# Patient Record
Sex: FEMALE | Race: OTHER | NOT HISPANIC OR LATINO | URBAN - METROPOLITAN AREA
[De-identification: names, ages, dates, MRNs, and addresses within clinical notes are randomized per-mention and may not be internally consistent; named-entity substitution may affect disease eponyms.]

---

## 2018-01-29 ENCOUNTER — OFFICE VISIT CONVERTED (OUTPATIENT)
Dept: INTERNAL MEDICINE | Facility: CLINIC | Age: 23
End: 2018-01-29
Attending: INTERNAL MEDICINE

## 2018-04-09 ENCOUNTER — OFFICE VISIT CONVERTED (OUTPATIENT)
Dept: INTERNAL MEDICINE | Facility: CLINIC | Age: 23
End: 2018-04-09
Attending: INTERNAL MEDICINE

## 2018-07-09 ENCOUNTER — OFFICE VISIT CONVERTED (OUTPATIENT)
Dept: INTERNAL MEDICINE | Facility: CLINIC | Age: 23
End: 2018-07-09
Attending: INTERNAL MEDICINE

## 2018-09-11 ENCOUNTER — OFFICE VISIT CONVERTED (OUTPATIENT)
Dept: INTERNAL MEDICINE | Facility: CLINIC | Age: 23
End: 2018-09-11
Attending: INTERNAL MEDICINE

## 2018-09-28 ENCOUNTER — CONVERSION ENCOUNTER (OUTPATIENT)
Dept: MAMMOGRAPHY | Facility: HOSPITAL | Age: 23
End: 2018-09-28

## 2019-01-17 ENCOUNTER — HOSPITAL ENCOUNTER (OUTPATIENT)
Dept: OTHER | Facility: HOSPITAL | Age: 24
Discharge: HOME | End: 2019-01-17
Attending: INTERNAL MEDICINE

## 2019-01-17 LAB
EST. AVERAGE GLUCOSE BLD GHB EST-MCNC: 77 MG/DL
HBA1C MFR BLD: <4.3 % (ref 3.5–5.7)
IRON SATN MFR SERPL: 22 % (ref 20–55)
IRON SERPL-MCNC: 70 UG/DL (ref 60–170)
T4 FREE SERPL-MCNC: 1 NG/DL (ref 0.9–1.8)
TIBC SERPL-MCNC: 315 UG/DL (ref 245–450)
TRANSFERRIN SERPL-MCNC: 220 MG/DL (ref 250–380)
TSH SERPL-ACNC: 6.53 M[IU]/L (ref 0.27–4.2)

## 2019-02-07 ENCOUNTER — OFFICE VISIT CONVERTED (OUTPATIENT)
Dept: INTERNAL MEDICINE | Facility: CLINIC | Age: 24
End: 2019-02-07
Attending: INTERNAL MEDICINE

## 2019-02-07 ENCOUNTER — HOSPITAL ENCOUNTER (OUTPATIENT)
Dept: OTHER | Facility: HOSPITAL | Age: 24
Discharge: HOME | End: 2019-02-07
Attending: INTERNAL MEDICINE

## 2019-02-09 LAB — BACTERIA UR CULT: NORMAL

## 2019-05-07 ENCOUNTER — CONVERSION ENCOUNTER (OUTPATIENT)
Dept: INTERNAL MEDICINE | Facility: CLINIC | Age: 24
End: 2019-05-07

## 2019-05-07 ENCOUNTER — HOSPITAL ENCOUNTER (OUTPATIENT)
Dept: OTHER | Facility: HOSPITAL | Age: 24
Discharge: HOME | End: 2019-05-07
Attending: INTERNAL MEDICINE

## 2019-05-07 ENCOUNTER — OFFICE VISIT CONVERTED (OUTPATIENT)
Dept: INTERNAL MEDICINE | Facility: CLINIC | Age: 24
End: 2019-05-07
Attending: INTERNAL MEDICINE

## 2019-05-07 LAB
BASOPHILS # BLD AUTO: 0.08 10*3/UL (ref 0–0.2)
BASOPHILS NFR BLD AUTO: 1 % (ref 0–3)
CONV ABS IMM GRAN: 0.01 10*3/UL (ref 0–0.2)
CONV IMMATURE GRAN: 0.1 % (ref 0–1.8)
DEPRECATED RDW RBC AUTO: 44 FL (ref 36.4–46.3)
EOSINOPHIL # BLD AUTO: 0.27 10*3/UL (ref 0–0.7)
EOSINOPHIL # BLD AUTO: 3.3 % (ref 0–7)
ERYTHROCYTE [DISTWIDTH] IN BLOOD BY AUTOMATED COUNT: 12.1 % (ref 11.7–14.4)
HBA1C MFR BLD: 13.7 G/DL (ref 12–16)
HCT VFR BLD AUTO: 42.7 % (ref 37–47)
LYMPHOCYTES # BLD AUTO: 2.16 10*3/UL (ref 1–5)
MCH RBC QN AUTO: 31.9 PG (ref 27–31)
MCHC RBC AUTO-ENTMCNC: 32.1 G/DL (ref 33–37)
MCV RBC AUTO: 99.5 FL (ref 81–99)
MONOCYTES # BLD AUTO: 0.58 10*3/UL (ref 0.2–1.2)
MONOCYTES NFR BLD AUTO: 7.1 % (ref 3–10)
NEUTROPHILS # BLD AUTO: 5.07 10*3/UL (ref 2–8)
NEUTROPHILS NFR BLD AUTO: 62.1 % (ref 30–85)
NRBC CBCN: 0 % (ref 0–0.7)
PLATELET # BLD AUTO: 289 10*3/UL (ref 130–400)
PMV BLD AUTO: 11.4 FL (ref 9.4–12.3)
RBC # BLD AUTO: 4.29 10*6/UL (ref 4.2–5.4)
VARIANT LYMPHS NFR BLD MANUAL: 26.4 % (ref 20–45)
WBC # BLD AUTO: 8.17 10*3/UL (ref 4.8–10.8)

## 2019-05-08 LAB
ALBUMIN SERPL-MCNC: 4.4 G/DL (ref 3.5–5)
ALBUMIN/GLOB SERPL: 1.4 {RATIO} (ref 1.4–2.6)
ALP SERPL-CCNC: 63 U/L (ref 42–98)
ALT SERPL-CCNC: 17 U/L (ref 10–40)
ANION GAP SERPL CALC-SCNC: 16 MMOL/L (ref 8–19)
AST SERPL-CCNC: 19 U/L (ref 15–50)
BILIRUB SERPL-MCNC: 0.5 MG/DL (ref 0.2–1.3)
BUN SERPL-MCNC: 9 MG/DL (ref 5–25)
BUN/CREAT SERPL: 13 {RATIO} (ref 6–20)
CALCIUM SERPL-MCNC: 9.6 MG/DL (ref 8.7–10.4)
CHLORIDE SERPL-SCNC: 102 MMOL/L (ref 99–111)
CONV CO2: 27 MMOL/L (ref 22–32)
CONV TOTAL PROTEIN: 7.5 G/DL (ref 6.3–8.2)
CREAT UR-MCNC: 0.69 MG/DL (ref 0.5–0.9)
GFR SERPLBLD BASED ON 1.73 SQ M-ARVRAT: >60 ML/MIN/{1.73_M2}
GLOBULIN UR ELPH-MCNC: 3.1 G/DL (ref 2–3.5)
GLUCOSE SERPL-MCNC: 80 MG/DL (ref 65–99)
IRON SATN MFR SERPL: 19 % (ref 20–55)
IRON SERPL-MCNC: 56 UG/DL (ref 60–170)
MAGNESIUM SERPL-MCNC: 1.84 MG/DL (ref 1.6–2.3)
OSMOLALITY SERPL CALC.SUM OF ELEC: 290 MOSM/KG (ref 273–304)
POTASSIUM SERPL-SCNC: 3.8 MMOL/L (ref 3.5–5.3)
SODIUM SERPL-SCNC: 141 MMOL/L (ref 135–147)
T4 FREE SERPL-MCNC: 1.3 NG/DL (ref 0.9–1.8)
TIBC SERPL-MCNC: 289 UG/DL (ref 245–450)
TRANSFERRIN SERPL-MCNC: 202 MG/DL (ref 250–380)
TSH SERPL-ACNC: 1.35 M[IU]/L (ref 0.27–4.2)
VIT B12 SERPL-MCNC: 463 PG/ML (ref 211–911)

## 2019-05-15 ENCOUNTER — HOSPITAL ENCOUNTER (OUTPATIENT)
Dept: CT IMAGING | Facility: HOSPITAL | Age: 24
Discharge: HOME | End: 2019-05-15
Attending: INTERNAL MEDICINE

## 2019-08-05 ENCOUNTER — HOSPITAL ENCOUNTER (OUTPATIENT)
Dept: OTHER | Facility: HOSPITAL | Age: 24
Discharge: HOME | End: 2019-08-05
Attending: INTERNAL MEDICINE

## 2019-08-05 ENCOUNTER — OFFICE VISIT CONVERTED (OUTPATIENT)
Dept: INTERNAL MEDICINE | Facility: CLINIC | Age: 24
End: 2019-08-05
Attending: INTERNAL MEDICINE

## 2019-08-05 LAB
T4 FREE SERPL-MCNC: 1.4 NG/DL (ref 0.9–1.8)
TSH SERPL-ACNC: 2.4 M[IU]/L (ref 0.27–4.2)

## 2019-08-06 LAB
IRON SATN MFR SERPL: 23 % (ref 20–55)
IRON SERPL-MCNC: 65 UG/DL (ref 60–170)
TIBC SERPL-MCNC: 287 UG/DL (ref 245–450)
TRANSFERRIN SERPL-MCNC: 201 MG/DL (ref 250–380)

## 2019-09-17 ENCOUNTER — OFFICE VISIT CONVERTED (OUTPATIENT)
Dept: INTERNAL MEDICINE | Facility: CLINIC | Age: 24
End: 2019-09-17
Attending: INTERNAL MEDICINE

## 2019-09-25 ENCOUNTER — OFFICE VISIT CONVERTED (OUTPATIENT)
Dept: SURGERY | Facility: CLINIC | Age: 24
End: 2019-09-25
Attending: SURGERY

## 2019-10-02 ENCOUNTER — OFFICE VISIT CONVERTED (OUTPATIENT)
Dept: SURGERY | Facility: CLINIC | Age: 24
End: 2019-10-02
Attending: SURGERY

## 2019-10-11 ENCOUNTER — HOSPITAL ENCOUNTER (OUTPATIENT)
Dept: PERIOP | Facility: HOSPITAL | Age: 24
Setting detail: HOSPITAL OUTPATIENT SURGERY
Discharge: HOME | End: 2019-10-11
Attending: SURGERY

## 2019-10-11 LAB — HCG UR QL: NEGATIVE

## 2019-10-13 LAB — BACTERIA SPEC ANAEROBE CULT: NORMAL

## 2019-10-14 LAB
AMOXICILLIN+CLAV SUSC ISLT: >=32
AMPICILLIN SUSC ISLT: >=32
AMPICILLIN+SULBAC SUSC ISLT: >=32
BACTERIA SPEC AEROBE CULT: ABNORMAL
CEFAZOLIN SUSC ISLT: >=64
CEFEPIME SUSC ISLT: <=1
CEFTAZIDIME SUSC ISLT: 16
CEFTRIAXONE SUSC ISLT: 8
CEFUROXIME ORAL SUSC ISLT: >=64
CEFUROXIME PARENTER SUSC ISLT: >=64
CIPROFLOXACIN SUSC ISLT: <=0.25
ERTAPENEM SUSC ISLT: <=0.5
GENTAMICIN SUSC ISLT: <=1
LEVOFLOXACIN SUSC ISLT: <=0.12
TETRACYCLINE SUSC ISLT: <=1
TMP SMX SUSC ISLT: <=20
TOBRAMYCIN SUSC ISLT: <=1

## 2019-10-23 ENCOUNTER — OFFICE VISIT CONVERTED (OUTPATIENT)
Dept: SURGERY | Facility: CLINIC | Age: 24
End: 2019-10-23
Attending: SURGERY

## 2019-11-05 ENCOUNTER — HOSPITAL ENCOUNTER (OUTPATIENT)
Dept: OTHER | Facility: HOSPITAL | Age: 24
Discharge: HOME | End: 2019-11-05
Attending: INTERNAL MEDICINE

## 2019-11-05 ENCOUNTER — OFFICE VISIT CONVERTED (OUTPATIENT)
Dept: INTERNAL MEDICINE | Facility: CLINIC | Age: 24
End: 2019-11-05
Attending: INTERNAL MEDICINE

## 2019-11-05 ENCOUNTER — CONVERSION ENCOUNTER (OUTPATIENT)
Dept: INTERNAL MEDICINE | Facility: CLINIC | Age: 24
End: 2019-11-05

## 2019-11-05 LAB
ALBUMIN SERPL-MCNC: 4.8 G/DL (ref 3.5–5)
ALBUMIN/GLOB SERPL: 1.4 {RATIO} (ref 1.4–2.6)
ALP SERPL-CCNC: 64 U/L (ref 42–98)
ALT SERPL-CCNC: 16 U/L (ref 10–40)
ANION GAP SERPL CALC-SCNC: 16 MMOL/L (ref 8–19)
AST SERPL-CCNC: 19 U/L (ref 15–50)
BASOPHILS # BLD AUTO: 0.05 10*3/UL (ref 0–0.2)
BASOPHILS NFR BLD AUTO: 0.8 % (ref 0–3)
BILIRUB SERPL-MCNC: 0.32 MG/DL (ref 0.2–1.3)
BUN SERPL-MCNC: 8 MG/DL (ref 5–25)
BUN/CREAT SERPL: 12 {RATIO} (ref 6–20)
CALCIUM SERPL-MCNC: 10.2 MG/DL (ref 8.7–10.4)
CHLORIDE SERPL-SCNC: 99 MMOL/L (ref 99–111)
CONV ABS IMM GRAN: 0.01 10*3/UL (ref 0–0.2)
CONV CO2: 25 MMOL/L (ref 22–32)
CONV IMMATURE GRAN: 0.2 % (ref 0–1.8)
CONV TOTAL PROTEIN: 8.2 G/DL (ref 6.3–8.2)
CREAT UR-MCNC: 0.66 MG/DL (ref 0.5–0.9)
CRP SERPL HS-MCNC: 0.62 MG/DL (ref 0–0.5)
DEPRECATED RDW RBC AUTO: 42.4 FL (ref 36.4–46.3)
EOSINOPHIL # BLD AUTO: 0.21 10*3/UL (ref 0–0.7)
EOSINOPHIL # BLD AUTO: 3.5 % (ref 0–7)
ERYTHROCYTE [DISTWIDTH] IN BLOOD BY AUTOMATED COUNT: 11.8 % (ref 11.7–14.4)
GFR SERPLBLD BASED ON 1.73 SQ M-ARVRAT: >60 ML/MIN/{1.73_M2}
GLOBULIN UR ELPH-MCNC: 3.4 G/DL (ref 2–3.5)
GLUCOSE SERPL-MCNC: 86 MG/DL (ref 65–99)
HCT VFR BLD AUTO: 43.9 % (ref 37–47)
HGB BLD-MCNC: 14.3 G/DL (ref 12–16)
LYMPHOCYTES # BLD AUTO: 1.64 10*3/UL (ref 1–5)
LYMPHOCYTES NFR BLD AUTO: 27.5 % (ref 20–45)
MCH RBC QN AUTO: 31.8 PG (ref 27–31)
MCHC RBC AUTO-ENTMCNC: 32.6 G/DL (ref 33–37)
MCV RBC AUTO: 97.8 FL (ref 81–99)
MONOCYTES # BLD AUTO: 0.49 10*3/UL (ref 0.2–1.2)
MONOCYTES NFR BLD AUTO: 8.2 % (ref 3–10)
NEUTROPHILS # BLD AUTO: 3.57 10*3/UL (ref 2–8)
NEUTROPHILS NFR BLD AUTO: 59.8 % (ref 30–85)
NRBC CBCN: 0 % (ref 0–0.7)
OSMOLALITY SERPL CALC.SUM OF ELEC: 280 MOSM/KG (ref 273–304)
PLATELET # BLD AUTO: 308 10*3/UL (ref 130–400)
PMV BLD AUTO: 11.5 FL (ref 9.4–12.3)
POTASSIUM SERPL-SCNC: 4.2 MMOL/L (ref 3.5–5.3)
RBC # BLD AUTO: 4.49 10*6/UL (ref 4.2–5.4)
SODIUM SERPL-SCNC: 136 MMOL/L (ref 135–147)
T4 FREE SERPL-MCNC: 1.2 NG/DL (ref 0.9–1.8)
TSH SERPL-ACNC: 2.45 M[IU]/L (ref 0.27–4.2)
WBC # BLD AUTO: 5.97 10*3/UL (ref 4.8–10.8)

## 2019-11-08 LAB
AMOXICILLIN+CLAV SUSC ISLT: 16
AMOXICILLIN+CLAV SUSC ISLT: 16
AMOXICILLIN+CLAV SUSC ISLT: <=2
AMPICILLIN SUSC ISLT: 16
AMPICILLIN SUSC ISLT: <=2
AMPICILLIN SUSC ISLT: >=32
AMPICILLIN+SULBAC SUSC ISLT: <=2
AMPICILLIN+SULBAC SUSC ISLT: >=32
AMPICILLIN+SULBAC SUSC ISLT: >=32
BACTERIA SPEC AEROBE CULT: ABNORMAL
CEFAZOLIN SUSC ISLT: <=4
CEFAZOLIN SUSC ISLT: >=64
CEFAZOLIN SUSC ISLT: >=64
CEFEPIME SUSC ISLT: 2
CEFEPIME SUSC ISLT: <=1
CEFEPIME SUSC ISLT: <=1
CEFTAZIDIME SUSC ISLT: 2
CEFTAZIDIME SUSC ISLT: 8
CEFTAZIDIME SUSC ISLT: <=1
CEFTRIAXONE SUSC ISLT: <=1
CEFTRIAXONE SUSC ISLT: <=1
CEFTRIAXONE SUSC ISLT: >=64
CEFUROXIME ORAL SUSC ISLT: 2
CEFUROXIME ORAL SUSC ISLT: 8
CEFUROXIME ORAL SUSC ISLT: >=64
CEFUROXIME PARENTER SUSC ISLT: 2
CEFUROXIME PARENTER SUSC ISLT: 8
CEFUROXIME PARENTER SUSC ISLT: >=64
CIPROFLOXACIN SUSC ISLT: 1
CIPROFLOXACIN SUSC ISLT: <=0.25
CIPROFLOXACIN SUSC ISLT: <=0.25
ERTAPENEM SUSC ISLT: <=0.5
GENTAMICIN SUSC ISLT: <=1
LEVOFLOXACIN SUSC ISLT: 1
LEVOFLOXACIN SUSC ISLT: <=0.12
LEVOFLOXACIN SUSC ISLT: <=0.12
TETRACYCLINE SUSC ISLT: 4
TETRACYCLINE SUSC ISLT: <=1
TETRACYCLINE SUSC ISLT: >=16
TMP SMX SUSC ISLT: <=20
TMP SMX SUSC ISLT: <=20
TMP SMX SUSC ISLT: >=320
TOBRAMYCIN SUSC ISLT: 8
TOBRAMYCIN SUSC ISLT: <=1
TOBRAMYCIN SUSC ISLT: <=1

## 2019-12-13 ENCOUNTER — OFFICE VISIT CONVERTED (OUTPATIENT)
Dept: OTOLARYNGOLOGY | Facility: CLINIC | Age: 24
End: 2019-12-13
Attending: OTOLARYNGOLOGY

## 2020-01-24 ENCOUNTER — OFFICE VISIT CONVERTED (OUTPATIENT)
Dept: OTOLARYNGOLOGY | Facility: CLINIC | Age: 25
End: 2020-01-24
Attending: OTOLARYNGOLOGY

## 2020-03-05 ENCOUNTER — HOSPITAL ENCOUNTER (OUTPATIENT)
Dept: OTHER | Facility: HOSPITAL | Age: 25
Discharge: HOME | End: 2020-03-05
Attending: INTERNAL MEDICINE

## 2020-03-05 ENCOUNTER — OFFICE VISIT CONVERTED (OUTPATIENT)
Dept: INTERNAL MEDICINE | Facility: CLINIC | Age: 25
End: 2020-03-05
Attending: INTERNAL MEDICINE

## 2020-03-08 LAB
AMOXICILLIN+CLAV SUSC ISLT: 4
AMOXICILLIN+CLAV SUSC ISLT: <=2
AMPICILLIN SUSC ISLT: 8
AMPICILLIN SUSC ISLT: <=2
AMPICILLIN+SULBAC SUSC ISLT: <=2
AMPICILLIN+SULBAC SUSC ISLT: <=2
BACTERIA SPEC AEROBE CULT: ABNORMAL
CEFAZOLIN SUSC ISLT: <=4
CEFAZOLIN SUSC ISLT: <=4
CEFEPIME SUSC ISLT: <=1
CEFEPIME SUSC ISLT: <=1
CEFTAZIDIME SUSC ISLT: <=1
CEFTAZIDIME SUSC ISLT: <=1
CEFTRIAXONE SUSC ISLT: <=1
CEFTRIAXONE SUSC ISLT: <=1
CEFUROXIME ORAL SUSC ISLT: 4
CEFUROXIME ORAL SUSC ISLT: <=1
CEFUROXIME PARENTER SUSC ISLT: 4
CEFUROXIME PARENTER SUSC ISLT: <=1
CIPROFLOXACIN SUSC ISLT: <=0.25
CIPROFLOXACIN SUSC ISLT: <=0.25
ERTAPENEM SUSC ISLT: 1
ERTAPENEM SUSC ISLT: <=0.5
GENTAMICIN SUSC ISLT: <=1
GENTAMICIN SUSC ISLT: <=1
LEVOFLOXACIN SUSC ISLT: <=0.12
LEVOFLOXACIN SUSC ISLT: <=0.12
TETRACYCLINE SUSC ISLT: <=1
TETRACYCLINE SUSC ISLT: >=16
TMP SMX SUSC ISLT: <=20
TMP SMX SUSC ISLT: <=20
TOBRAMYCIN SUSC ISLT: <=1
TOBRAMYCIN SUSC ISLT: <=1

## 2020-03-13 ENCOUNTER — CONVERSION ENCOUNTER (OUTPATIENT)
Dept: INTERNAL MEDICINE | Facility: CLINIC | Age: 25
End: 2020-03-13

## 2020-03-13 ENCOUNTER — OFFICE VISIT CONVERTED (OUTPATIENT)
Dept: INTERNAL MEDICINE | Facility: CLINIC | Age: 25
End: 2020-03-13
Attending: INTERNAL MEDICINE

## 2020-03-13 ENCOUNTER — HOSPITAL ENCOUNTER (OUTPATIENT)
Dept: OTHER | Facility: HOSPITAL | Age: 25
Discharge: HOME | End: 2020-03-13
Attending: INTERNAL MEDICINE

## 2020-05-08 ENCOUNTER — CONVERSION ENCOUNTER (OUTPATIENT)
Dept: INTERNAL MEDICINE | Facility: CLINIC | Age: 25
End: 2020-05-08

## 2020-05-08 ENCOUNTER — OFFICE VISIT CONVERTED (OUTPATIENT)
Dept: INTERNAL MEDICINE | Facility: CLINIC | Age: 25
End: 2020-05-08
Attending: PHYSICIAN ASSISTANT

## 2020-05-08 ENCOUNTER — HOSPITAL ENCOUNTER (OUTPATIENT)
Dept: OTHER | Facility: HOSPITAL | Age: 25
Discharge: HOME | End: 2020-05-08
Attending: PHYSICIAN ASSISTANT

## 2020-05-10 LAB
AMOXICILLIN+CLAV SUSC ISLT: <=2
AMOXICILLIN+CLAV SUSC ISLT: <=2
AMPICILLIN SUSC ISLT: 4
AMPICILLIN SUSC ISLT: >=32
AMPICILLIN+SULBAC SUSC ISLT: 16
AMPICILLIN+SULBAC SUSC ISLT: <=2
BACTERIA SPEC AEROBE CULT: ABNORMAL
CEFAZOLIN SUSC ISLT: 8
CEFAZOLIN SUSC ISLT: <=4
CEFEPIME SUSC ISLT: <=1
CEFEPIME SUSC ISLT: <=1
CEFTAZIDIME SUSC ISLT: <=1
CEFTAZIDIME SUSC ISLT: <=1
CEFTRIAXONE SUSC ISLT: <=1
CEFTRIAXONE SUSC ISLT: <=1
CEFUROXIME ORAL SUSC ISLT: 4
CEFUROXIME ORAL SUSC ISLT: 4
CEFUROXIME PARENTER SUSC ISLT: 4
CEFUROXIME PARENTER SUSC ISLT: 4
CIPROFLOXACIN SUSC ISLT: <=0.25
CIPROFLOXACIN SUSC ISLT: <=0.25
ERTAPENEM SUSC ISLT: <=0.5
ERTAPENEM SUSC ISLT: <=0.5
GENTAMICIN SUSC ISLT: <=1
GENTAMICIN SUSC ISLT: <=1
LEVOFLOXACIN SUSC ISLT: <=0.12
LEVOFLOXACIN SUSC ISLT: <=0.12
TETRACYCLINE SUSC ISLT: <=1
TETRACYCLINE SUSC ISLT: <=1
TMP SMX SUSC ISLT: <=20
TMP SMX SUSC ISLT: <=20
TOBRAMYCIN SUSC ISLT: <=1
TOBRAMYCIN SUSC ISLT: <=1

## 2020-05-13 ENCOUNTER — OFFICE VISIT CONVERTED (OUTPATIENT)
Dept: SURGERY | Facility: CLINIC | Age: 25
End: 2020-05-13
Attending: SURGERY

## 2020-05-22 ENCOUNTER — HOSPITAL ENCOUNTER (OUTPATIENT)
Dept: PERIOP | Facility: HOSPITAL | Age: 25
Setting detail: HOSPITAL OUTPATIENT SURGERY
Discharge: HOME | End: 2020-05-22
Attending: SURGERY

## 2020-05-22 LAB — HCG UR QL: NEGATIVE

## 2020-05-27 ENCOUNTER — OFFICE VISIT CONVERTED (OUTPATIENT)
Dept: SURGERY | Facility: CLINIC | Age: 25
End: 2020-05-27
Attending: SURGERY

## 2020-06-03 ENCOUNTER — OFFICE VISIT CONVERTED (OUTPATIENT)
Dept: SURGERY | Facility: CLINIC | Age: 25
End: 2020-06-03
Attending: SURGERY

## 2020-06-03 ENCOUNTER — CONVERSION ENCOUNTER (OUTPATIENT)
Dept: SURGERY | Facility: CLINIC | Age: 25
End: 2020-06-03

## 2020-06-17 ENCOUNTER — OFFICE VISIT CONVERTED (OUTPATIENT)
Dept: SURGERY | Facility: CLINIC | Age: 25
End: 2020-06-17
Attending: SURGERY

## 2020-06-17 ENCOUNTER — CONVERSION ENCOUNTER (OUTPATIENT)
Dept: SURGERY | Facility: CLINIC | Age: 25
End: 2020-06-17

## 2020-07-01 ENCOUNTER — OFFICE VISIT CONVERTED (OUTPATIENT)
Dept: SURGERY | Facility: CLINIC | Age: 25
End: 2020-07-01
Attending: SURGERY

## 2020-07-15 ENCOUNTER — OFFICE VISIT CONVERTED (OUTPATIENT)
Dept: SURGERY | Facility: CLINIC | Age: 25
End: 2020-07-15
Attending: SURGERY

## 2021-04-21 ENCOUNTER — OFFICE VISIT CONVERTED (OUTPATIENT)
Dept: INTERNAL MEDICINE | Facility: CLINIC | Age: 26
End: 2021-04-21
Attending: INTERNAL MEDICINE

## 2021-04-21 ENCOUNTER — HOSPITAL ENCOUNTER (OUTPATIENT)
Dept: OTHER | Facility: HOSPITAL | Age: 26
Discharge: HOME | End: 2021-04-21
Attending: INTERNAL MEDICINE

## 2021-04-21 LAB
ALBUMIN SERPL-MCNC: 4.4 G/DL (ref 3.5–5)
ALBUMIN/GLOB SERPL: 1.3 {RATIO} (ref 1.4–2.6)
ALP SERPL-CCNC: 70 U/L (ref 42–98)
ALT SERPL-CCNC: 18 U/L (ref 10–40)
ANION GAP SERPL CALC-SCNC: 16 MMOL/L (ref 8–19)
AST SERPL-CCNC: 22 U/L (ref 15–50)
BASOPHILS # BLD AUTO: 0.07 10*3/UL (ref 0–0.2)
BASOPHILS NFR BLD AUTO: 1.1 % (ref 0–3)
BILIRUB SERPL-MCNC: 0.27 MG/DL (ref 0.2–1.3)
BILIRUB UR QL STRIP: NEGATIVE
BUN SERPL-MCNC: 10 MG/DL (ref 5–25)
BUN/CREAT SERPL: 15 {RATIO} (ref 6–20)
CALCIUM SERPL-MCNC: 9.4 MG/DL (ref 8.7–10.4)
CHLORIDE SERPL-SCNC: 103 MMOL/L (ref 99–111)
CHOLEST SERPL-MCNC: 134 MG/DL (ref 107–200)
CHOLEST/HDLC SERPL: 1.9 {RATIO} (ref 3–6)
COLOR UR: YELLOW
CONV ABS IMM GRAN: 0.01 10*3/UL (ref 0–0.2)
CONV CLARITY OF URINE: CLEAR
CONV CO2: 22 MMOL/L (ref 22–32)
CONV IMMATURE GRAN: 0.2 % (ref 0–1.8)
CONV PROTEIN IN URINE BY AUTOMATED TEST STRIP: NEGATIVE
CONV TOTAL PROTEIN: 7.9 G/DL (ref 6.3–8.2)
CONV UROBILINOGEN IN URINE BY AUTOMATED TEST STRIP: NORMAL
CREAT UR-MCNC: 0.67 MG/DL (ref 0.5–0.9)
DEPRECATED RDW RBC AUTO: 40 FL (ref 36.4–46.3)
EOSINOPHIL # BLD AUTO: 0.21 10*3/UL (ref 0–0.7)
EOSINOPHIL # BLD AUTO: 3.4 % (ref 0–7)
ERYTHROCYTE [DISTWIDTH] IN BLOOD BY AUTOMATED COUNT: 11.7 % (ref 11.7–14.4)
GFR SERPLBLD BASED ON 1.73 SQ M-ARVRAT: >60 ML/MIN/{1.73_M2}
GLOBULIN UR ELPH-MCNC: 3.5 G/DL (ref 2–3.5)
GLUCOSE SERPL-MCNC: 67 MG/DL (ref 65–99)
GLUCOSE UR QL: NEGATIVE
HCT VFR BLD AUTO: 41.7 % (ref 37–47)
HDLC SERPL-MCNC: 71 MG/DL (ref 40–60)
HGB BLD-MCNC: 13.7 G/DL (ref 12–16)
HGB UR QL STRIP: NEGATIVE
IRON SATN MFR SERPL: 41 % (ref 20–55)
IRON SERPL-MCNC: 103 UG/DL (ref 60–170)
KETONES UR QL STRIP: NEGATIVE
LDLC SERPL CALC-MCNC: 54 MG/DL (ref 70–100)
LEUKOCYTE ESTERASE UR QL STRIP: NEGATIVE
LYMPHOCYTES # BLD AUTO: 1.66 10*3/UL (ref 1–5)
LYMPHOCYTES NFR BLD AUTO: 27.1 % (ref 20–45)
MCH RBC QN AUTO: 30.6 PG (ref 27–31)
MCHC RBC AUTO-ENTMCNC: 32.9 G/DL (ref 33–37)
MCV RBC AUTO: 93.1 FL (ref 81–99)
MONOCYTES # BLD AUTO: 0.45 10*3/UL (ref 0.2–1.2)
MONOCYTES NFR BLD AUTO: 7.4 % (ref 3–10)
NEUTROPHILS # BLD AUTO: 3.72 10*3/UL (ref 2–8)
NEUTROPHILS NFR BLD AUTO: 60.8 % (ref 30–85)
NITRITE UR QL STRIP: NEGATIVE
NRBC CBCN: 0 % (ref 0–0.7)
OSMOLALITY SERPL CALC.SUM OF ELEC: 281 MOSM/KG (ref 273–304)
PH UR STRIP.AUTO: 5.5 [PH]
PLATELET # BLD AUTO: 273 10*3/UL (ref 130–400)
PMV BLD AUTO: 11.4 FL (ref 9.4–12.3)
POTASSIUM SERPL-SCNC: 4 MMOL/L (ref 3.5–5.3)
RBC # BLD AUTO: 4.48 10*6/UL (ref 4.2–5.4)
SODIUM SERPL-SCNC: 137 MMOL/L (ref 135–147)
SP GR UR: 1.03
T4 FREE SERPL-MCNC: 1.2 NG/DL (ref 0.9–1.8)
TIBC SERPL-MCNC: 249 UG/DL (ref 245–450)
TRANSFERRIN SERPL-MCNC: 174 MG/DL (ref 250–380)
TRIGL SERPL-MCNC: 44 MG/DL (ref 40–150)
TSH SERPL-ACNC: 3.1 M[IU]/L (ref 0.27–4.2)
VLDLC SERPL-MCNC: 9 MG/DL (ref 5–37)
WBC # BLD AUTO: 6.12 10*3/UL (ref 4.8–10.8)

## 2021-04-22 LAB
25(OH)D3 SERPL-MCNC: 29.4 NG/ML (ref 30–100)
FOLATE SERPL-MCNC: 6.6 NG/ML (ref 4.8–20)
VIT B12 SERPL-MCNC: 444 PG/ML (ref 211–911)

## 2021-04-23 LAB
DSDNA AB SER-ACNC: NEGATIVE [IU]/ML
ENA AB SER IA-ACNC: NEGATIVE {RATIO}

## 2021-04-24 LAB — CCP IGA+IGG SERPL IA-ACNC: 16 UNITS (ref 0–19)

## 2021-04-27 LAB
CONV RHEUMATOID FACTOR IGA: 3 UNITS (ref 0–6)
CONV RHEUMATOID FACTOR IGG: 13 UNITS (ref 0–6)
CONV RHEUMATOID FACTOR IGM: 0 UNITS (ref 0–6)

## 2021-05-13 ENCOUNTER — HOSPITAL ENCOUNTER (OUTPATIENT)
Dept: OTHER | Facility: HOSPITAL | Age: 26
Discharge: HOME | End: 2021-05-13
Attending: INTERNAL MEDICINE

## 2021-05-13 NOTE — PROGRESS NOTES
Progress Note      Patient Name: Claudia Wilkins   Patient ID: [id number]   Sex: Female   YOB: 1995    Primary Care Provider: Demi Fernandez MD   Referring Provider: Maria L Cramer PA-C    Visit Date: May 13, 2020    Provider: Dhaval Ocampo MD   Location: [facility]   Location Address: [address] [place] [address]  [id number]   Location Phone: [phone]          Chief Complaint  · Outpatient History & Physical / Surgical Orders  · Follow Up  · ASHLIE-RECTAL ABSCESS      History Of Present Illness  Claudia Wilkins is a 24 year old /[place] Native female who presents to the office today as a consult from Maria L Cramer PA-C. She presents today for reassessment of a perianal abscess. The patient had a couple of I&D's of this abscessed area. It was allowed to stay open to heal by secondary intention. The patient seemed to be doing fairly well since the last time I saw her and I haven't seen her for several months. She reports over the last several months, the area has not really completely healed. It has been kind of open and bulging but she hasn't really had any problem with it. However, recently, within the last couple of weeks, she has been having increasing drainage from the area, which she describes as either bloody or thin but can also be sometimes greenish or yellowish. She denies any signs of stool leaking from the area. She reports it has grown in size over the past couple of weeks, as the drainage is increasing.       Past Medical History  Allergic rhinitis; Anemia; Anxiety; Arthritis; Celiac disease; Chronic Fatigue Syndrome; Chronic liver disease; Chronic pain; Depression; Fibromyalgia; Gastric ulcer; Hemorrhoids; Hypermobility syndrome; Hypothyroidism; IBS (irritable bowel syndrome); Insomnia; Lactose intolerance; Moderate episode of recurrent major depressive disorder; Osteopenia; Pancreatitis; Pouchitis; Primary sclerosing cholangitis; Psychiatric Care;  "PTSD; RA (rheumatoid arthritis); Recurrent epistaxis; Scoliosis; Ulcerative colitis         Past Surgical History  Colectomy, open; Colonoscopy; Colostomy; EGD; Perirectal excision         Medication List  Acidophilus-Pectin 75 million cell -100 mg oral capsule; Anusol-HC 2.5 % topical cream with perineal applicator; Calcium 500 + D 500 mg(1,250mg) -200 unit oral tablet; CBD oil; Cipro 500 mg/5 mL oral suspension,microcapsule recon; cyclobenzaprine 5 mg oral tablet; ferrous sulfate 325 mg (65 mg iron) oral tablet; hydroxyzine HCl 25 mg oral tablet; L.acidoph,saliva-B.bif-S.therm 175 mg oral capsule; loperamide 2 mg oral capsule; Mepilex 4 X 4 \" topical bandage; metronidazole 250 mg oral tablet; mupirocin 2 % topical ointment; Oyster Shell Calcium-Vit D3 500 mg(1,250mg) -200 unit oral tablet; Synthroid 75 mcg oral tablet; ursodiol 300 mg oral capsule; Zoloft 50 mg oral tablet         Allergy List  Latex       Allergies Reconciled  Family Medical History  Stroke; Heart Disease; Prostate cancer; Kidney Disease; Diabetes         Social History  Alcohol (Never); Tobacco (Never)         Review of Systems  · Gastrointestinal  o Denies  o : nausea, vomiting, diarrhea, constipation      Vitals  Date Time BP Position Site L\R Cuff Size HR RR TEMP (F) WT  HT  BMI kg/m2 BSA m2 O2 Sat        05/13/2020 03:20 PM       14  111lbs 16oz 5'  2\" 20.48 1.49           Physical Examination  · Constitutional  o Appearance  o : well developed, well-nourished, alert and in no acute distress  · Head and Face  o Head  o :   § Inspection  § : no deformities or lesions  · Eyes  o Conjunctivae  o : clear  o Sclerae  o : clear  · Neck  o Inspection/Palpation  o : normal appearance, no masses or tenderness, trachea midline  · Respiratory  o Respiratory Effort  o : breathing unlabored  o Inspection of Chest  o : normal appearance, no retractions  · Cardiovascular  o Heart  o : regular rate and rhythm  · Gastrointestinal  o Abdominal " Examination  o : abdomen is soft.   · Genitourinary  o Anus  o : her perianal area has a 1.0 cm area of open reddish appearing skin. I am not able to elicit any discharge. It is mildly indurated.   · Lymphatic  o Neck  o : no lymphadenopathy present  o Axilla  o : no lymphadenopathy present  o Groin  o : no lymphadenopathy present  · Skin and Subcutaneous Tissue  o General Inspection  o : no rashes present, no lesions present, no areas of discoloration  · Neurologic  o Cranial Nerves  o : grossly intact  o Sensation  o : grossly intact  o Gait and Station  o :   § Gait Screening  § : normal gait, able to stand without diffculty  o Cerebellar Function  o : no obvious abnormalities  · Psychiatric  o Judgement and Insight  o : judgment and insight intact  o Mood and Affect  o : mood normal, affect appropriate          Assessment  · Pre-Surgical Orders     V72.84       Patient with a nonhealing area around her perianal area from previous I&D that seems to have an infection although she reports it is getting much better with round of Cipro prescribed by her PCP.     Problems Reconciled  Plan  · Medications  o Medications have been Reconciled  o Transition of Care or Provider Policy  · Instructions  o PLAN:   o Handouts Provided-Pre-Procedure Instructions including date and time and location of procedure.  o Surgical Facility: [facility]  o ****Patient Status****  o Outpatient  o ********************  o RISK AND BENEFITS:  o Consent for surgery: Given these options, the patient has verbally expressed an understanding of the risks of surgery and finds these risks acceptable. We will proceed with surgery as soon as possible.  o Consult Anesthesia for any post-operative block, or any pain management procedure deemed necessary by the anestesiologist for adequate post-operative pain control.   o O.R. PREP: Per protocol  o IV: Per Anesthesia  o IV: LR@ 75ml/hr  o PLEASE SIGN PERMIT FOR:  o *__Kefzol 2 gram IV on  call to OR.  o *___The above History and Physical Examination has been completed within 30 days of admission.  o Electronically Identified Patient Education Materials Provided Electronically     We discussed re-excision of this area. I will reassess it for the potential for a fistula although I don't see any evidence of a fistula but she does have underlying medical issues, which could predispose her to that. We will perform a rectal exam under anesthesia with speculum. We will excise the area and reassess for fistula. I discussed all of this with the patient extensively as well as with her mother. All questions were answered. Since the antibiotics seem to be working fairly well, the patient wants to hold off for one more week and see if the antibiotics will cause this to resolve. They will call back for reassessment in one week. If at one week it doesn't resolve, she will then undergo the discussed surgical procedure.             Electronically Signed by: Amalia Christianson-, -Author on May 14, 2020 01:12:30 PM  Electronically Co-signed by: Dhaval Ocampo MD -Reviewer on May 14, 2020 08:36:45 PM

## 2021-05-13 NOTE — PROGRESS NOTES
Progress Note      Patient Name: Claudia Wilkins   Patient ID: [id number]   Sex: Female   YOB: 1995    Primary Care Provider: Demi Fernandez MD   Referring Provider: Maria L Cramer PA-C    Visit Date: May 27, 2020    Provider: Dhaval Ocampo MD   Location: [facility]   Location Address: [address] [place] [address]  [id number]   Location Phone: [phone]          Chief Complaint  · Status Post Surgery      History Of Present Illness  Claudia Wilkins is a 24 year old /[place] Native female who presents today for a postoperative visit. She follows-up status post excision of a perirectal anal fistula. The patient had a Seton placed in the OR. She has done okay at home. She has had some pain. She is having a fair amount of drainage from the area and had some redness and irritation around her gluteal folds. Otherwise, no unexpected signs or symptoms.       Past Medical History  Allergic rhinitis; Anemia; Anxiety; Arthritis; Celiac disease; Chronic Fatigue Syndrome; Chronic liver disease; Chronic pain; Depression; Fibromyalgia; Gastric ulcer; Hemorrhoids; Hypermobility syndrome; Hypothyroidism; IBS (irritable bowel syndrome); Insomnia; Lactose intolerance; Moderate episode of recurrent major depressive disorder; Osteopenia; Pancreatitis; Pouchitis; Primary sclerosing cholangitis; Psychiatric Care; PTSD; RA (rheumatoid arthritis); Recurrent epistaxis; Scoliosis; Ulcerative colitis         Past Surgical History  Colectomy, open; Colonoscopy; Colostomy; EGD; Perirectal excision         Medication List  Acidophilus-Pectin 75 million cell -100 mg oral capsule; Anusol-HC 2.5 % topical cream with perineal applicator; Calcium 500 + D 500 mg(1,250mg) -200 unit oral tablet; CBD oil; Cipro 500 mg/5 mL oral suspension,microcapsule recon; cyclobenzaprine 5 mg oral tablet; ferrous sulfate 325 mg (65 mg iron) oral tablet; hydroxyzine HCl 25 mg oral tablet; L.acidoph,saliva-BEmilbif-S.therm  "175 mg oral capsule; loperamide 2 mg oral capsule; Mepilex 4 X 4 \" topical bandage; metronidazole 250 mg oral tablet; mupirocin 2 % topical ointment; Oyster Shell Calcium-Vit D3 500 mg(1,250mg) -200 unit oral tablet; Silvadene 1 % topical cream; Synthroid 75 mcg oral tablet; ursodiol 300 mg oral capsule; Zoloft 50 mg oral tablet         Allergy List  Latex         Family Medical History  Stroke; Heart Disease; Prostate cancer; Kidney Disease; Diabetes         Social History  Alcohol (Never); Tobacco (Never)         Review of Systems  · Cardiovascular  o Denies  o : chest pain on exertion, shortness of breath, lower extremity swelling  · Respiratory  o Denies  o : shortness of breath, coughing up blood  · Gastrointestinal  o Denies  o : chronic abdominal pain      Vitals  Date Time BP Position Site L\R Cuff Size HR RR TEMP (F) WT  HT  BMI kg/m2 BSA m2 O2 Sat        05/27/2020 01:20 PM       16  115lbs 0oz 5'  2\" 21.03 1.51           Physical Examination  · Constitutional  o Appearance  o : well developed, well-nourished, alert and in no acute distress  · Head and Face  o Head  o :   § Inspection  § : no deformities or lesions  · Eyes  o Conjunctivae  o : clear  o Sclerae  o : nonicteric  · Neck  o Inspection/Palpation  o : normal appearance, no masses or tenderness, trachea midline  · Respiratory  o Respiratory Effort  o : breathing unlabored  o Inspection of Chest  o : normal appearance, no retractions  · Cardiovascular  o Heart  o : regular rate and rhythm  · Gastrointestinal  o Abdominal Examination  o :   § Abdomen  § : abdomen is soft  · Lymphatic  o Neck  o : no lymphadenopathy present  o Axilla  o : no lymphadenopathy present  o Groin  o : no lymphadenopathy present  · Skin and Subcutaneous Tissue  o General Inspection  o : she has a lot of induration and some skin breakdown around the gluteal folds. Her perianal Seton is in place. It is draining some mucoid drainage.  · Neurologic  o Cranial Nerves  o : " grossly intact  o Sensation  o : grossly intact  o Gait and Station  o :   § Gait Screening  § : normal gait, able to stand without diffculty  o Cerebellar Function  o : no obvious abnormalities  · Psychiatric  o Judgement and Insight  o : judgment and insight intact  o Mood and Affect  o : mood normal, affect appropriate          Assessment  · Follow-up examination following surgery     V67.00/Z09       Patient status post excision of a perianal fistula.     Problems Reconciled  Plan  · Medications  o Medications have been Reconciled  o Transition of Care or Provider Policy     I went to the tighten the Seton today with the assistance of my nurse practitioner. Unfortunately during the tightening of the Seton, one of the legs of the Seton came dislodged from our instrument and the Seton got pulled out. We went ahead and pulled out the Seton. We will see if the fistula will resolve on its own. I think it will. I think I got enough of the rectal mucosa open as well as the fistulous tract excised so that she should heal appropriately.     She is doing fairly well. We will give her some Silvadene for the skin breakdown around the perianal area to try and act as a barrier and heal underneath. I will reassess the wound in one week. I discussed all of this with the patient. All questions were answered.  She voiced understanding and agreed to proceed with the above plan.             Electronically Signed by: Amalia Christianson-, -Author on May 29, 2020 08:18:00 AM  Electronically Co-signed by: Dhaval Ocampo MD -Reviewer on May 29, 2020 09:40:25 AM

## 2021-05-13 NOTE — PROGRESS NOTES
Progress Note      Patient Name: Claudia Wilkins   Patient ID: [id number]   Sex: Female   YOB: 1995    Primary Care Provider: Demi Fernandez MD   Referring Provider: Demi Fernandez MD    Visit Date: May 8, 2020    Provider: Maria L Cramer PA-C   Location: [facility]   Location Address: [address] [place] [address], [facility]  [place], [place]  [id number]   Location Phone: [phone]          Chief Complaint  · Drainage   · Abscess  · Low energy  · Diarrhea       History Of Present Illness  Claudia Wilkins is a 24 year old /[place] Native female who presents for evaluation and treatment of:      infection of perianal area.   She has yellow/green discharge coming from wound.  She has blood coming from wound at times.   She had lesions operated on 2 x in the fall.     mom increased loperamide  Pt has chronic diarrhea. She is having 7 episodes of liquidy diarrhea/day.   She has noticed bright red blood at times. denies rectal pain.   Denies fever, abdominal pain.   She threw up 1 time this am.   Denies urinary sx.       Past Medical History  Disease Name Date Onset Notes   Allergic rhinitis --  --    Anemia --  --    Anxiety --  --    Arthritis --  --    Celiac disease --  --    Chronic Fatigue Syndrome --  --    Chronic liver disease --  --    Chronic pain --  --    Depression --  --    Fibromyalgia --  --    Gastric ulcer --  --    Hemorrhoids --  --    Hypermobility syndrome --  --    Hypothyroidism --  --    IBS (irritable bowel syndrome) --  --    Insomnia --  --    Lactose intolerance --  --    Moderate episode of recurrent major depressive disorder 01/29/2018 cont current meds   Osteopenia --  --    Pancreatitis --  --    Pouchitis --  --    Primary sclerosing cholangitis --  --    Psychiatric Care --  --    PTSD --  --    RA (rheumatoid arthritis) --  --    Recurrent epistaxis --  --    Scoliosis --  --    Ulcerative colitis --  --          Past Surgical History  Procedure  "Name Date Notes   Colectomy, open --  colectomy w/ileoanal j pouch & small bowel pull through 2006   Colonoscopy --  --    Colostomy --  --    EGD --  --    Perirectal excision --  --          Medication List  Name Date Started Instructions   Acidophilus-Pectin 75 million cell -100 mg oral capsule 03/16/2020 take 1 capsule by oral route 2 times a day   Anusol-HC 2.5 % topical cream with perineal applicator 03/17/2020 apply a thin layer to the affected area(s) by topical route 2-4 times daily   Calcium 500 + D 500 mg(1,250mg) -200 unit oral tablet 09/18/2018 take 1 tablet by oral route 2 for 30 days   CBD oil  --    cyclobenzaprine 5 mg oral tablet 04/17/2020 TAKE 1 TABLET BY MOUTH THREE TIMES DAILY   ferrous sulfate 325 mg (65 mg iron) oral tablet 04/08/2020 TAKE ONE TABLET BY MOUTH TWICE DAILY   hydroxyzine HCl 25 mg oral tablet 04/08/2020 take 1 tablet (25 mg) by oral route at night   L.acidoph,saliva-B.bif-S.therm 175 mg oral capsule 12/06/2019 TAKE ONE CAPSULE BY MOUTH EVERY DAY   loperamide 2 mg oral capsule 03/16/2020 TAKE 1 OR 2 CAPSULES BY MOUTH EVERY SIX HOURS AS NEEDED FOR DIARRHEA   Mepilex 4 X 4 \" topical bandage 03/16/2020 apply bandage to affected area(s) by topical route 2 times a day   metronidazole 250 mg oral tablet 04/17/2020 TAKE 1 TABLET BY MOUTH ONCE A DAY (IN THE MORNING)   mupirocin 2 % topical ointment 01/24/2020 apply to affected area by external route 2 times a day for 30 days   Oyster Shell Calcium-Vit D3 500 mg(1,250mg) -200 unit oral tablet 04/08/2020 TAKE ONE TABLET BY MOUTH TWICE DAILY   Synthroid 75 mcg oral tablet 02/11/2020 take 1 tablet (75 mcg) by oral route once daily for 90 days   ursodiol 300 mg oral capsule 03/20/2020 TAKE TWO CAPSULES BY MOUTH TWICE DAILY   Zoloft 50 mg oral tablet 04/10/2020 TAKE 1 TABLET BY MOUTH ONCE DAILY         Allergy List  Allergen Name Date Reaction Notes   Latex --  --  --        Allergies Reconciled  Family Medical History  Disease Name " Relative/Age Notes   Stroke  grandparents   Heart Disease  grandparents   Prostate cancer Grandfather (paternal)/   --    Kidney Disease  grandparents   Diabetes  grandparents         Social History  Finding Status Start/Stop Quantity Notes   Alcohol Never --/-- --  --    Tobacco Never --/-- --  --          Immunizations  NameDate Admin Mfg Trade Name Lot Number Route Inj VIS Given VIS Publication   DTaP08/04/2000 NE Not Entered  NE NE 07/19/2018    Comments:    DTaP02/26/1998 NE Not Entered  NE NE 07/19/2018    Comments:    DTaP01/30/1997 NE Not Entered  NE NE 07/19/2018    Comments:    DTaP1996 NE Not Entered  NE NE 07/19/2018    Comments:    DTaP1996 NE Not Entered  NE NE 07/19/2018    Comments:    Hepatitis B01/30/1997 NE Not Entered  NE NE 07/19/2018    Comments:    Hepatitis  NE Not Entered  NE NE 07/19/2018    Comments:    Hepatitis  NE Not Entered  NE NE 07/19/2018    Comments:    Hib01/30/1997 NE Not Entered  NE NE 07/19/2018    Comments:    Hib1996 NE Not Entered  NE NE 07/19/2018    Comments:    Hib1996 NE Not Entered  NE NE 07/19/2018    Comments:    HPV09/01/2010 NE Not Entered  NE NE 07/19/2018    Comments:    HPV04/12/2009 NE Not Entered  NE NE 07/19/2018    Comments:    HPV06/04/2008 NE Not Entered  NE NE 07/19/2018    Comments:    Ientydvdp13/29/2018 SKB Fluarix, quadrivalent, preservative free [id number] IM RD 01/29/2018 08/19/2014   Comments: pt tolerated well and left office in stable condition, [place], [place]   Hgicckavw98/12/2016 SKB Fluarix, quadrivalent, preservative free [id number] IM LD 12/12/2016 08/19/2014   Comments: pt tolerated well, left office in stable condiiton   Yeaxeemqm02/27/2015 SKB Fluarix, quadrivalent, preservative free [id number] IM LA 10/27/2015 08/19/2014   Comments: Patient given vaccine and left office in stable condition.   IPV08/04/2000 NE Not Entered  NE NE 07/19/2018    Comments:    IPV01/30/1997 NE Not Entered  NE NE 07/19/2018   "  Comments:    IPV1996 NE Not Entered  NE NE 07/19/2018    Comments:    IPV1996 NE Not Entered  NE NE 07/19/2018    Comments:    Meningococcal (MNG)06/04/2008 NE Not Entered  NE NE 07/19/2018    Comments:    MMR08/04/2000 NE Not Entered  NE NE 07/19/2018    Comments:    MMR08/31/1997 NE Not Entered  NE NE 07/19/2018    Comments:    Tdap07/31/2007 NE Not Entered  NE NE 07/19/2018    Comments:    Uwnalzgff36/06/2000 NE Not Entered  NE NE 07/19/2018    Comments:          Review of Systems  · Constitutional  o Denies  o : fever, fatigue, weight loss, weight gain  · Cardiovascular  o Denies  o : lower extremity edema, claudication, chest pressure, palpitations  · Respiratory  o Denies  o : shortness of breath, wheezing, cough, hemoptysis, dyspnea on exertion  · Gastrointestinal  o Admits  o : vomiting, diarrhea  o Denies  o : nausea  · Integument  o Admits  o : changes to existing skin lesions or moles      Vitals  Date Time BP Position Site L\R Cuff Size HR RR TEMP (F) WT  HT  BMI kg/m2 BSA m2 O2 Sat HC       03/05/2020 04:49 PM 92/62 Sitting    113 - R  99 116lbs 16oz 5'  2\" 21.4 1.52 97 %    03/13/2020 04:19 /78 Sitting    113 - R  97.9 120lbs 0oz 5'  2\" 21.95 1.54 98 %    05/08/2020 11:05 /68 Sitting    128 - R  98.4 112lbs 6oz 5'  2\" 20.55 1.49 98 %          Physical Examination  · Constitutional  o Appearance  o : no acute distress, well-nourished  · Head and Face  o Head  o :   § Inspection  § : atraumatic, normocephalic  · Ears, Nose, Mouth and Throat  o Ears  o :   § External Ears  § : normal  o Nose  o :   § Intranasal Exam  § : nares patent  o Oral Cavity  o :   § Oral Mucosa  § : moist mucous membranes  · Respiratory  o Respiratory Effort  o : breathing comfortably, symmetric chest rise  o Auscultation of Lungs  o : clear to asculatation bilaterally, no wheezes, rales, or rhonchii  · Cardiovascular  o Heart  o :   § Auscultation of Heart  § : regular rate and rhythm, no murmurs, rubs, " or gallops  o Peripheral Vascular System  o :   § Extremities  § : no edema  · Gastrointestinal  o Abdominal Examination  o : abdomen nontender to palpation, tone normal without rigidity or guarding, no masses present, abdominal contour scaphoid  · Genitourinary  o Anus  o : no inflammation or lesions present  · Skin and Subcutaneous Tissue  o General Inspection  o : 1.5 cm round, raised pedunculated lesion noted in perianal region, erythematous with small opening that has thick yellow exudate coming out  · Neurologic  o Mental Status Examination  o :   § Orientation  § : grossly oriented to person, place and time  o Gait and Station  o :   § Gait Screening  § : normal gait              Assessment  · Diarrhea     787.91/R19.7  Will monitor increase in diarrhea for improvement with tx of abscess infection. Pt will let us know if no improvement or if sx worsen.  · Abscess     682.9/L02.91  Discussed pt with Dr. Fernandez who is in agreement with plan. Culture sent today, will start Cipro. Mom requested liquid for better absorption. Referral placed back to Gen Surgery for eval of lesion and removal. PT and mom understands and agrees    Problems Reconciled  Plan  · Orders  o ACO-39: Current medications updated and reviewed () - - 05/08/2020  o GENERAL SURGERY (GNSUR) - 682.9/L02.91 - 05/08/2020   Already est with Dr. Walker  o Wound Culture ([id number]) - 682.9/L02.91 - 05/08/2020  · Medications  o Cipro 500 mg/5 mL oral suspension,microcapsule recon   SIG: take 5 milliliters (500 mg) by oral route 2 times per day for 7 days   DISP: (70) milliliters with 0 refills  Prescribed on 05/08/2020     o Medications have been Reconciled  o Transition of Care or Provider Policy  · Instructions  o Patient was educated/instructed on their diagnosis, treatment and medications prior to discharge from the clinic today.  · Disposition  o Call or Return if symptoms worsen or persist.  o Care Transition  o RIDGE  Sent            Electronically Signed by: Maria L Cramer PA-C -Author on May 9, 2020 08:35:56 PM

## 2021-05-13 NOTE — PROGRESS NOTES
Progress Note      Patient Name: Claudia Wilkins   Patient ID: [id number]   Sex: Female   YOB: 1995    Primary Care Provider: Demi Fernandez MD   Referring Provider: Maria L Cramer PA-C    Visit Date: Yi 3, 2020    Provider: Dhaval Ocampo MD   Location: [facility]   Location Address: [address] [place] [address]  [id number]   Location Phone: [phone]          Chief Complaint  · Status Post Surgery      History Of Present Illness  Claudia Wilkins is a 24 year old /[place] Native female who presents today for a postoperative visit. She follows-up status post excision of a perianal fistula. The patient reports having a lot of drainage and pain around the perianal fistula area. She is not reporting any new or unexpected symptoms other than a little bit of bloating and difficulty with appetite.       Past Medical History  Allergic rhinitis; Anemia; Anxiety; Arthritis; Celiac disease; Chronic Fatigue Syndrome; Chronic liver disease; Chronic pain; Depression; Fibromyalgia; Gastric ulcer; Hemorrhoids; Hypermobility syndrome; Hypothyroidism; IBS (irritable bowel syndrome); Insomnia; Lactose intolerance; Moderate episode of recurrent major depressive disorder; Osteopenia; Pancreatitis; Pouchitis; Primary sclerosing cholangitis; Psychiatric Care; PTSD; RA (rheumatoid arthritis); Recurrent epistaxis; Scoliosis; Ulcerative colitis         Past Surgical History  Colectomy, open; Colonoscopy; Colostomy; EGD; Perirectal excision         Medication List  Acidophilus-Pectin 75 million cell -100 mg oral capsule; Anusol-HC 2.5 % topical cream with perineal applicator; Calcium 500 + D 500 mg(1,250mg) -200 unit oral tablet; CBD oil; Cipro 500 mg/5 mL oral suspension,microcapsule recon; cyclobenzaprine 5 mg oral tablet; ferrous sulfate 325 mg (65 mg iron) oral tablet; hydroxyzine HCl 25 mg oral tablet; L.acidoph,saliva-B.bif-S.therm 175 mg oral capsule; loperamide 2 mg oral capsule;  "Mepilex 4 X 4 \" topical bandage; metronidazole 250 mg oral tablet; mupirocin 2 % topical ointment; Norco 5-325 mg oral tablet; Oyster Shell Calcium-Vit D3 500 mg(1,250mg) -200 unit oral tablet; Silvadene 1 % topical cream; Synthroid 75 mcg oral tablet; ursodiol 300 mg oral capsule; Zoloft 50 mg oral tablet         Allergy List  Latex       Allergies Reconciled  Family Medical History  Stroke; Heart Disease; Prostate cancer; Kidney Disease; Diabetes         Social History  Alcohol (Never); Tobacco (Never)         Review of Systems  · Cardiovascular  o Denies  o : chest pain on exertion, shortness of breath, lower extremity swelling  · Respiratory  o Denies  o : shortness of breath, coughing up blood  · Gastrointestinal  o Denies  o : chronic abdominal pain      Vitals  Date Time BP Position Site L\R Cuff Size HR RR TEMP (F) WT  HT  BMI kg/m2 BSA m2 O2 Sat HC       06/03/2020 01:06 PM       12  109lbs 6oz 5'  2\" 20 1.47           Physical Examination  · Constitutional  o Appearance  o : well developed, well-nourished, alert and in no acute distress  · Head and Face  o Head  o :   § Inspection  § : no deformities or lesions  · Eyes  o Conjunctivae  o : clear  o Sclerae  o : nonicteric  · Neck  o Inspection/Palpation  o : normal appearance, no masses or tenderness, trachea midline  · Respiratory  o Respiratory Effort  o : breathing unlabored  o Inspection of Chest  o : normal appearance, no retractions  · Cardiovascular  o Heart  o : regular rate and rhythm  · Gastrointestinal  o Abdominal Examination  o :   § Abdomen  § : abdomen is soft.  · Lymphatic  o Neck  o : no lymphadenopathy present  o Axilla  o : no lymphadenopathy present  o Groin  o : no lymphadenopathy present  · Skin and Subcutaneous Tissue  o General Inspection  o : her perianal area still has a lot of mucosal drainage. It appears to be healing appropriately. I don't see any signs of infection.  · Neurologic  o Cranial Nerves  o : grossly " intact  o Sensation  o : grossly intact  o Gait and Station  o :   § Gait Screening  § : normal gait, able to stand without diffculty  o Cerebellar Function  o : no obvious abnormalities  · Psychiatric  o Judgement and Insight  o : judgment and insight intact  o Mood and Affect  o : mood normal, affect appropriate          Assessment  · Encounter for examination following surgery     V67.00/Z09       Patient status post ligation of a perianal fistula.     Problems Reconciled  Plan  · Medications  o Medications have been Reconciled  o Transition of Care or Provider Policy     We will plan to recheck her in two weeks. She was given more pain medication yesterday by my nurse practitioner. She should continue her local wound care regimen. I discussed all of this with the patient and her mother. All questions were answered.  She voiced understanding and agreed to proceed with the above plan.             Electronically Signed by: Amalia Christianson-, -Author on June 4, 2020 11:12:26 AM  Electronically Co-signed by: Dhaval Ocampo MD -Reviewer on June 4, 2020 11:36:02 AM

## 2021-05-13 NOTE — PROGRESS NOTES
"   Progress Note      Patient Name: Claudia Wilkins   Patient ID: [id number]   Sex: Female   YOB: 1995    Primary Care Provider: Demi Fernandez MD   Referring Provider: Maria L Cramer PA-C    Visit Date: July 1, 2020    Provider: Dhaval Ocampo MD   Location: [facility]   Location Address: [address] [place] [address]  [id number]   Location Phone: [phone]          Chief Complaint  · Follow Up Office Visit      History Of Present Illness  Claudia Wilkins is a 24 year old /[place] Native female who follows up for attempted ligation of a perianal fistula. The patient reports she is about the same as I saw her a couple weeks ago. She feels like maybe she has healed up a little bit but she is still having a little mucoid drainage from around her perianal area.       Past Medical History  Allergic rhinitis; Anemia; Anxiety; Arthritis; Celiac disease; Chronic Fatigue Syndrome; Chronic liver disease; Chronic pain; Depression; Fibromyalgia; Gastric ulcer; Hemorrhoids; Hypermobility syndrome; Hypothyroidism; IBS (irritable bowel syndrome); Insomnia; Lactose intolerance; Moderate episode of recurrent major depressive disorder; Osteopenia; Pancreatitis; Pouchitis; Primary sclerosing cholangitis; Psychiatric Care; PTSD; RA (rheumatoid arthritis); Recurrent epistaxis; Scoliosis; Ulcerative colitis         Past Surgical History  Colectomy, open; Colonoscopy; Colostomy; EGD; Perirectal excision         Medication List  Acidophilus-Pectin 75 million cell -100 mg oral capsule; Anusol-HC 2.5 % topical cream with perineal applicator; Calcium 500 + D 500 mg(1,250mg) -200 unit oral tablet; CBD oil; Cipro 500 mg/5 mL oral suspension,microcapsule recon; cyclobenzaprine 5 mg oral tablet; ferrous sulfate 325 mg (65 mg iron) oral tablet; hydroxyzine HCl 25 mg oral tablet; L.acidoph,saliva-B.bif-S.therm 175 mg oral capsule; loperamide 2 mg oral capsule; Mepilex 4 X 4 \" topical bandage; " "metronidazole 250 mg oral tablet; mupirocin 2 % topical ointment; Norco 5-325 mg oral tablet; Oyster Shell Calcium-Vit D3 500 mg(1,250mg) -200 unit oral tablet; sertraline 50 mg oral tablet; Silvadene 1 % topical cream; Synthroid 75 mcg oral tablet; ursodiol 300 mg oral capsule; Zoloft 50 mg oral tablet         Allergy List  Latex         Family Medical History  Stroke; Heart Disease; Prostate cancer; Kidney Disease; Diabetes         Social History  Alcohol (Never); Tobacco (Never)         Review of Systems  · Cardiovascular  o Denies  o : chest pain on exertion, shortness of breath, lower extremity swelling  · Respiratory  o Denies  o : shortness of breath, coughing up blood  · Gastrointestinal  o Denies  o : chronic abdominal pain      Vitals  Date Time BP Position Site L\R Cuff Size HR RR TEMP (F) WT  HT  BMI kg/m2 BSA m2 O2 Sat        07/01/2020 01:42 PM       15  111lbs 0oz 5'  2.5\" 19.98 1.49           Physical Examination  · Constitutional  o Appearance  o : well developed, well-nourished, alert and in no acute distress  · Head and Face  o Head  o :   § Inspection  § : no deformities or lesions  · Eyes  o Conjunctivae  o : clear  o Sclerae  o : nonicteric  · Neck  o Inspection/Palpation  o : normal appearance, no masses or tenderness, trachea midline  · Respiratory  o Respiratory Effort  o : breathing unlabored  o Inspection of Chest  o : normal appearance, no retractions  · Cardiovascular  o Heart  o : regular rate and rhythm  · Gastrointestinal  o Abdominal Examination  o :   § Abdomen  § : soft  · Genitourinary  o Perineum  o : Around her perianal area there is still a small subcentimeter lesion that has continued to drain what appears to be mucus type drainage. No alma delia stool.   · Lymphatic  o Neck  o : no lymphadenopathy present  o Axilla  o : no lymphadenopathy present  o Groin  o : no lymphadenopathy present  · Skin and Subcutaneous Tissue  o General Inspection  o : no rashes present, no lesions " present, no areas of discoloration  · Neurologic  o Cranial Nerves  o : grossly intact  o Sensation  o : grossly intact  o Gait and Station  o :   § Gait Screening  § : normal gait, able to stand without diffculty  o Cerebellar Function  o : no obvious abnormalities  · Psychiatric  o Judgement and Insight  o : judgment and insight intact  o Mood and Affect  o : mood normal, affect appropriate          Assessment  · Surgery follow-up     V67.00/Z09       Patient with mucoid drainage.     Problems Reconciled  Plan  · Medications  o Medications have been Reconciled  o Transition of Care or Provider Policy  · Instructions  o PLAN:     We will give her two more weeks.  If she has not completely healed this fistula in the next couple of weeks I think we may have to refer her to a colorectal surgeon for re-evaluation.  Discussed with the patient.  All questions were answered.  She voiced understanding and agreed to proceed with the above plan.               Electronically Signed by: Zoila Torres-, Other -Author on July 6, 2020 01:01:33 PM  Electronically Co-signed by: Dhaval Ocampo MD -Reviewer on July 6, 2020 03:45:56 PM

## 2021-05-13 NOTE — PROGRESS NOTES
Progress Note      Patient Name: Claudia Wilkins   Patient ID: [id number]   Sex: Female   YOB: 1995    Primary Care Provider: Demi Fernandez MD   Referring Provider: Maria L Cramer PA-C    Visit Date: July 15, 2020    Provider: Dhaval Ocampo MD   Location: [facility]   Location Address: [address] [place] [address]  [id number]   Location Phone: [phone]          Chief Complaint  · Follow Up Office Visit      History Of Present Illness  Claudia Wilkins is a 24 year old /[place] Native female who presents today for a postoperative visit. She is very well known to me for a history of an anal fistula. The patient reports she has had a little bit of puffiness around the area. Her incision has not completely healed. She is still having a little bit of drainage. Otherwise, she seems to be doing about the same.       Past Medical History  Allergic rhinitis; Anemia; Anxiety; Arthritis; Celiac disease; Chronic Fatigue Syndrome; Chronic liver disease; Chronic pain; Depression; Fibromyalgia; Gastric ulcer; Hemorrhoids; Hypermobility syndrome; Hypothyroidism; IBS (irritable bowel syndrome); Insomnia; Lactose intolerance; Moderate episode of recurrent major depressive disorder; Osteopenia; Pancreatitis; Pouchitis; Primary sclerosing cholangitis; Psychiatric Care; PTSD; RA (rheumatoid arthritis); Recurrent epistaxis; Scoliosis; Ulcerative colitis         Past Surgical History  Colectomy, open; Colonoscopy; Colostomy; EGD; Perirectal excision         Medication List  Acidophilus-Pectin 75 million cell -100 mg oral capsule; Anusol-HC 2.5 % topical cream with perineal applicator; Calcium 500 + D 500 mg(1,250mg) -200 unit oral tablet; CBD oil; Cipro 500 mg/5 mL oral suspension,microcapsule recon; cyclobenzaprine 5 mg oral tablet; ferrous sulfate 325 mg (65 mg iron) oral tablet; hydroxyzine HCl 25 mg oral tablet; L.acidoph,saliva-B.bif-S.therm 175 mg oral capsule; loperamide 2 mg oral  "capsule; Mepilex 4 X 4 \" topical bandage; metronidazole 250 mg oral tablet; mupirocin 2 % topical ointment; Norco 5-325 mg oral tablet; Oyster Shell Calcium-Vit D3 500 mg(1,250mg) -200 unit oral tablet; sertraline 50 mg oral tablet; Silvadene 1 % topical cream; Synthroid 75 mcg oral tablet; ursodiol 300 mg oral capsule; Zoloft 50 mg oral tablet         Allergy List  Latex         Family Medical History  Stroke; Heart Disease; Prostate cancer; Kidney Disease; Diabetes         Social History  Alcohol (Never); Tobacco (Never)         Review of Systems  · Cardiovascular  o Denies  o : chest pain on exertion, shortness of breath, lower extremity swelling  · Respiratory  o Denies  o : shortness of breath, coughing up blood  · Gastrointestinal  o Denies  o : chronic abdominal pain      Vitals  Date Time BP Position Site L\R Cuff Size HR RR TEMP (F) WT  HT  BMI kg/m2 BSA m2 O2 Sat        07/15/2020 04:54 PM       16  111lbs 16oz 5'  2\" 20.48 1.49           Physical Examination  · Constitutional  o Appearance  o : well developed, well-nourished, alert and in no acute distress  · Head and Face  o Head  o :   § Inspection  § : no deformities or lesions  · Eyes  o Conjunctivae  o : clear  o Sclerae  o : nonicteric  · Neck  o Inspection/Palpation  o : normal appearance, no masses or tenderness, trachea midline  · Respiratory  o Respiratory Effort  o : breathing unlabored  o Inspection of Chest  o : normal appearance, no retractions  · Cardiovascular  o Heart  o : regular rate and rhythm  · Gastrointestinal  o Abdominal Examination  o :   § Abdomen  § : soft, nontender, nondistended  · Lymphatic  o Neck  o : no lymphadenopathy present  o Axilla  o : no lymphadenopathy present  o Groin  o : no lymphadenopathy present  · Skin and Subcutaneous Tissue  o General Inspection  o : her perianal area actually seems a little improved to me. She still has an open area. There is a small amount of drainage.  · Neurologic  o Cranial " Nerves  o : grossly intact  o Sensation  o : grossly intact  o Gait and Station  o :   § Gait Screening  § : normal gait, able to stand without diffculty  o Cerebellar Function  o : no obvious abnormalities  · Psychiatric  o Judgement and Insight  o : judgment and insight intact  o Mood and Affect  o : mood normal, affect appropriate          Assessment  · Postop check     V67.00/Z09       Patient with perianal fistula, having difficulty healing.     Problems Reconciled  Plan  · Medications  o Cipro 500 mg oral tablet   SIG: take 1 tablet (500 mg) by oral route 2 times per day for 7 days   DISP: (14) tablets with 0 refills  Prescribed on 07/15/2020     o prednisone 5 mg oral tablets,dose pack   SIG: take as directed for 7 days   DISP: (1) 21 ct blist pack with 0 refills  Prescribed on 07/15/2020     o Medications have been Reconciled  o Transition of Care or Provider Policy     I have discussed with the patient and her mother at length, as I am not sure they are having a lot of success in getting this area to heal. I did call the [facility]/Dr. Davis, who did her colectomy, and she is still seeing patients. I did offer to refer her back to Dr. Davis for an assessment. The patient and her mother state that they would have a lot of difficulty in traveling to [place] and they want to continue the treatment here. We discussed potential options. They want to try some medications regimens. I will give her some antibiotics and see if that will help clear the area up. We will also give her a little bit of steroid treatment, which she seemed to tolerate somewhat for her ulcerative colitis. I will follow-up back up again with them in two weeks to see how she is doing. If she fails this treatment, we can discuss another potential surgical option.  We have failed that a couple of times but that may be our only next best resort. I discussed all of this with the patient and her mother. All questions were  answered.  They voiced understanding and agreed to proceed with the above plan.             Electronically Signed by: Amalia Christianson-, -Author on July 16, 2020 09:25:29 AM  Electronically Co-signed by: Dhaval Ocampo MD -Reviewer on July 16, 2020 11:04:26 AM

## 2021-05-13 NOTE — PROGRESS NOTES
Progress Note      Patient Name: Claudia Wilkins   Patient ID: [id number]   Sex: Female   YOB: 1995    Primary Care Provider: Demi Fernandez MD   Referring Provider: Maria L Cramer PA-C    Visit Date: June 17, 2020    Provider: Dhaval Ocampo MD   Location: [facility]   Location Address: [address] [place] [address]  [id number]   Location Phone: [phone]          Chief Complaint  · Follow Up Office Visit      History Of Present Illness  Claudia Wilkins is a 24 year old /[place] Native female who presents today for a postoperative visit. She follows-up status post excision of a perianal fistula. The last time I saw her, she was still having a lot of drainage. She is actually doing much better. She is still having some drainage but it is minimal. She is healing quite a bit better. She still has pain around the area, especially with bowel movements, but seems to be improving overall.       Past Medical History  Allergic rhinitis; Anemia; Anxiety; Arthritis; Celiac disease; Chronic Fatigue Syndrome; Chronic liver disease; Chronic pain; Depression; Fibromyalgia; Gastric ulcer; Hemorrhoids; Hypermobility syndrome; Hypothyroidism; IBS (irritable bowel syndrome); Insomnia; Lactose intolerance; Moderate episode of recurrent major depressive disorder; Osteopenia; Pancreatitis; Pouchitis; Primary sclerosing cholangitis; Psychiatric Care; PTSD; RA (rheumatoid arthritis); Recurrent epistaxis; Scoliosis; Ulcerative colitis         Past Surgical History  Colectomy, open; Colonoscopy; Colostomy; EGD; Perirectal excision         Medication List  Acidophilus-Pectin 75 million cell -100 mg oral capsule; Anusol-HC 2.5 % topical cream with perineal applicator; Calcium 500 + D 500 mg(1,250mg) -200 unit oral tablet; CBD oil; Cipro 500 mg/5 mL oral suspension,microcapsule recon; cyclobenzaprine 5 mg oral tablet; ferrous sulfate 325 mg (65 mg iron) oral tablet; hydroxyzine HCl 25 mg oral  "tablet; L.acidoph,saliva-B.bif-S.therm 175 mg oral capsule; loperamide 2 mg oral capsule; Mepilex 4 X 4 \" topical bandage; metronidazole 250 mg oral tablet; mupirocin 2 % topical ointment; Norco 5-325 mg oral tablet; Oyster Shell Calcium-Vit D3 500 mg(1,250mg) -200 unit oral tablet; Silvadene 1 % topical cream; Synthroid 75 mcg oral tablet; ursodiol 300 mg oral capsule; Zoloft 50 mg oral tablet         Allergy List  Latex       Allergies Reconciled  Family Medical History  Stroke; Heart Disease; Prostate cancer; Kidney Disease; Diabetes         Social History  Alcohol (Never); Tobacco (Never)         Review of Systems  · Cardiovascular  o Denies  o : chest pain on exertion, shortness of breath, lower extremity swelling  · Respiratory  o Denies  o : shortness of breath, coughing up blood  · Gastrointestinal  o Denies  o : chronic abdominal pain      Vitals  Date Time BP Position Site L\R Cuff Size HR RR TEMP (F) WT  HT  BMI kg/m2 BSA m2 O2 Sat        06/17/2020 01:00 PM       16  110lbs 0oz 5'  2\" 20.12 1.48           Physical Examination  · Constitutional  o Appearance  o : well developed, well-nourished, alert and in no acute distress  · Head and Face  o Head  o :   § Inspection  § : no deformities or lesions  · Eyes  o Conjunctivae  o : clear  o Sclerae  o : nonicteric  · Neck  o Inspection/Palpation  o : normal appearance, no masses or tenderness, trachea midline  · Respiratory  o Respiratory Effort  o : breathing unlabored  o Inspection of Chest  o : normal appearance, no retractions  · Cardiovascular  o Heart  o : regular rate and rhythm  · Gastrointestinal  o Abdominal Examination  o :   § Abdomen  § : abdomen is soft.  · Lymphatic  o Neck  o : no lymphadenopathy present  o Axilla  o : no lymphadenopathy present  o Groin  o : no lymphadenopathy present  · Skin and Subcutaneous Tissue  o General Inspection  o : her perianal area shows much improvement in healing. The skin around the site is much less " erythematous. She is having less induration and less drainage. She has a small opening at the perianal area from where he have excised the fistula. It seems to be continuing to close.   · Neurologic  o Cranial Nerves  o : grossly intact  o Sensation  o : grossly intact  o Gait and Station  o :   § Gait Screening  § : normal gait, able to stand without diffculty  o Cerebellar Function  o : no obvious abnormalities  · Psychiatric  o Judgement and Insight  o : judgment and insight intact  o Mood and Affect  o : mood normal, affect appropriate          Assessment  · Surgery follow-up     V67.00/Z09       Patient status post excision of a perianal fistula.     Problems Reconciled  Plan  · Medications  o Medications have been Reconciled  o Transition of Care or Provider Policy     The patient is doing well and I am pleased with her progress. At this point in time, she can follow-up with me in the next 2-3 weeks. I discussed all of this with the patient and her mother. All questions were answered.  They voiced understanding and agreed to proceed with the above plan.             Electronically Signed by: Amalia Christianson-, -Author on June 18, 2020 09:46:04 AM  Electronically Co-signed by: Dhaval Ocampo MD -Reviewer on June 18, 2020 03:41:40 PM

## 2021-05-14 VITALS
SYSTOLIC BLOOD PRESSURE: 102 MMHG | HEIGHT: 60 IN | WEIGHT: 115.37 LBS | DIASTOLIC BLOOD PRESSURE: 68 MMHG | BODY MASS INDEX: 22.65 KG/M2 | OXYGEN SATURATION: 98 % | TEMPERATURE: 97.7 F | HEART RATE: 103 BPM

## 2021-05-14 NOTE — PROGRESS NOTES
"   Progress Note      Patient Name: Claudia Wilkins   Patient ID: [id number]   Sex: Female   YOB: 1995    Primary Care Provider: Demi Fernandez MD   Referring Provider: Maria L Cramer PA-C    Visit Date: April 21, 2021    Provider: Demi Fernandez MD   Location: [facility]   Location Address: [address] [place] [address]  [id number]   Location Phone: [phone]          Chief Complaint  · \"medication refills\"  · \"discuss what COVID vaccine would be best for her\"  · \"possible UTI\"      History Of Present Illness  Claudia Wilkins is a 25 year old /[place] Native female who presents for evaluation and treatment of:      chronic issues    still dealing with chronic abd pain  it isn't worsening  they haven't been able to get in with Dr. Agrawal due to COVID, they have been worried to get out  she does notice blood in her stool from time to time    still dealing with her vaginal lesion that is open and doesn't seem to want to heal    anxiety well controlled on current meds    no chest pain  breathing well       Past Medical History  Disease Name Date Onset Notes   Allergic rhinitis --  --    Anemia --  --    Anxiety --  --    Arthritis --  --    Celiac disease --  --    Chronic Fatigue Syndrome --  --    Chronic liver disease --  --    Chronic pain --  --    Depression --  --    Fibromyalgia --  --    Gastric ulcer --  --    Hemorrhoids --  --    Hypermobility syndrome --  --    Hypothyroidism --  --    IBS (irritable bowel syndrome) --  --    Insomnia --  --    Lactose intolerance --  --    Moderate episode of recurrent major depressive disorder 01/29/2018 cont current meds   Osteopenia --  --    Pancreatitis --  --    Pouchitis --  --    Primary sclerosing cholangitis --  --    Psychiatric Care --  --    PTSD --  --    RA (rheumatoid arthritis) --  --    Recurrent epistaxis --  --    Scoliosis --  --    Ulcerative colitis --  --          Past Surgical " "History  Procedure Name Date Notes   Colectomy, open --  colectomy w/ileoanal j pouch & small bowel pull through 2006   Colonoscopy --  --    Colostomy --  --    EGD --  --    Perirectal excision --  --          Medication List  Name Date Started Instructions   Acidophilus-Pectin 75 million cell -100 mg oral capsule 04/21/2021 TAKE 1 CAPSULE BY MOUTH TWICE DAILY   CBD oil  --    cyclobenzaprine 5 mg oral tablet 04/21/2021 TAKE 1 TABLET BY MOUTH THREE TIMES DAILY   ferrous sulfate 325 mg (65 mg iron) oral tablet 04/21/2021 TAKE 1 TABLET BY MOUTH TWICE DAILY   hydroxyzine HCl 25 mg oral tablet 04/21/2021 one tab po BID   loperamide 2 mg oral capsule 04/21/2021 TAKE 1 TO 2 CAPSULES BY MOUTH EVERY 6 HOURS AS NEEDED for diarrhea   Mepilex 4 X 4 \" topical bandage 04/21/2021 apply bandage to affected area(s) by topical route 2 times a day   metronidazole 250 mg oral tablet 04/21/2021 TAKE 1 TABLET BY MOUTH EVERY MORNING   mupirocin 2 % topical ointment 04/21/2021 apply to affected area by external route 2 times a day for 30 days   Oyster Shell Calcium-Vit D3 500 mg(1,250mg) -200 unit oral tablet 04/21/2021 TAKE 1 TABLET BY MOUTH TWICE DAILY   Synthroid 75 mcg oral tablet 04/21/2021 take 1 tablet (75 mcg) by oral route once daily for 90 days   ursodiol 300 mg oral capsule 04/21/2021 TAKE 2 CAPSULES BY MOUTH TWICE DAILY   Zoloft 50 mg oral tablet 04/21/2021 TAKE 1 TABLET BY MOUTH ONCE DAILY         Allergy List  Allergen Name Date Reaction Notes   Latex --  --  --        Allergies Reconciled  Family Medical History  Disease Name Relative/Age Notes   Stroke  grandparents   Heart Disease  grandparents   Prostate cancer Grandfather (paternal)/   --    Kidney Disease  grandparents   Diabetes  grandparents         Social History  Finding Status Start/Stop Quantity Notes   Alcohol Never --/-- --  --    Tobacco Never --/-- --  --          Immunizations  NameDate Admin Mfg Trade Name Lot Number Route Inj VIS Given VIS Publication " "  DTaP08/04/2000 NE Not Entered  NE NE 07/19/2018    Comments:    DTaP02/26/1998 NE Not Entered  NE NE 07/19/2018    Comments:    DTaP01/30/1997 NE Not Entered  NE NE 07/19/2018    Comments:    DTaP1996 NE Not Entered  NE NE 07/19/2018    Comments:    DTaP1996 NE Not Entered  NE NE 07/19/2018    Comments:    Hepatitis B01/30/1997 NE Not Entered  NE NE 07/19/2018    Comments:    Hepatitis  NE Not Entered  NE NE 07/19/2018    Comments:    Hepatitis  NE Not Entered  NE NE 07/19/2018    Comments:    Hib01/30/1997 NE Not Entered  NE NE 07/19/2018    Comments:    Hib1996 NE Not Entered  NE NE 07/19/2018    Comments:    Hib1996 NE Not Entered  NE NE 07/19/2018    Comments:    HPV09/01/2010 NE Not Entered  NE NE 07/19/2018    Comments:    HPV04/12/2009 NE Not Entered  NE NE 07/19/2018    Comments:    HPV06/04/2008 NE Not Entered  NE NE 07/19/2018    Comments:    Lhwkevqkn75/29/2018 SKB Fluarix, quadrivalent, preservative free [id number] IM RD 01/29/2018 08/19/2014   Comments: pt tolerated well and left office in stable condition, REYNALDO Orellana   IPV08/04/2000 NE Not Entered  NE NE 07/19/2018    Comments:    IPV01/30/1997 NE Not Entered  NE NE 07/19/2018    Comments:    IPV1996 NE Not Entered  NE NE 07/19/2018    Comments:    IPV1996 NE Not Entered  NE NE 07/19/2018    Comments:    Meningococcal (MNG)06/04/2008 NE Not Entered  NE NE 07/19/2018    Comments:    MMR08/04/2000 NE Not Entered  NE NE 07/19/2018    Comments:    MMR08/31/1997 NE Not Entered  NE NE 07/19/2018    Comments:    Tdap07/31/2007 NE Not Entered  NE NE 07/19/2018    Comments:    Mkapbxngq80/06/2000 NE Not Entered  NE NE 07/19/2018    Comments:          Vitals  Date Time BP Position Site L\R Cuff Size HR RR TEMP (F) WT  HT  BMI kg/m2 BSA m2 O2 Sat FR L/min FiO2 HC       05/08/2020 11:05 /68 Sitting    128 - R  98.4 112lbs 6oz 5'  2\" 20.55 1.49 98 %  21%    05/13/2020 03:20 PM       14  111lbs 16oz 5' " " 2\" 20.48 1.49       05/27/2020 01:20 PM       16  115lbs 0oz 5'  2\" 21.03 1.51       06/03/2020 01:06 PM       12  109lbs 6oz 5'  2\" 20 1.47       06/17/2020 01:00 PM       16  110lbs 0oz 5'  2\" 20.12 1.48       07/01/2020 01:42 PM       15  111lbs 0oz 5'  2.5\" 19.98 1.49       07/15/2020 04:54 PM       16  111lbs 16oz 5'  2\" 20.48 1.49       04/21/2021 02:29 /68 Sitting    103 - R  97.7 115lbs 6oz 5'  0.25\" 22.35 1.49 98 %  21%          Physical Examination  · Constitutional  o Appearance  o : no acute distress, well-nourished  · Head and Face  o Head  o :   § Inspection  § : atraumatic, normocephalic  · Eyes  o Eyes  o : extraocular movements intact, no scleral icterus, no conjunctival injection  · Respiratory  o Respiratory Effort  o : breathing comfortably, symmetric chest rise  o Auscultation of Lungs  o : clear to asculatation bilaterally, no wheezes, rales, or rhonchii  · Cardiovascular  o Heart  o :   § Auscultation of Heart  § : regular rate and rhythm, no murmurs, rubs, or gallops  o Peripheral Vascular System  o :   § Extremities  § : no edema  · Neurologic  o Mental Status Examination  o :   § Orientation  § : grossly oriented to person, place and time  o Gait and Station  o :   § Gait Screening  § : normal gait  · Psychiatric  o General  o : normal mood and affect          Results  · In-Office Procedures  o Lab procedure  § IOP - Urinalysis without Microscopy (Clinitek) [facility] (95828)   § Color Ur: Yellow   § Clarity Ur: Clear   § Glucose Ur Ql Strip: Negative   § Bilirub Ur Ql Strip: Negative   § Ketones Ur Ql Strip: Negative   § Sp Gr Ur Qn: 1.030   § Hgb Ur Ql Strip: Negative   § pH Ur-LsCnc: 5.5   § Prot Ur Ql Strip: Negative   § Urobilinogen Ur Strip-mCnc: 0.2 E.U./dL   § Nitrite Ur Ql Strip: Negative   § WBC Est Ur Ql Strip: Negative       Assessment  · Ulcerative colitis     556.9/K51.90  · Primary sclerosing cholangitis     576.1/K83.0  · Celiac " disease     579.0/K90.0  · Hypothyroidism     244.9/E03.9  · Anxiety disorder     300.00/F41.9  · Fatigue     780.79/R53.83  · Joint pain     719.40/M25.50  · Skin ulcer     707.9/L98.499       hasn't had imaging or lab work for quite some time  will order CT abd/pelvis  re-refer to Dr. Agrawal for scope  check labs and adjust meds as needed based on results    will try steroid cream for skin lesion, if no improvement could try mepilex    cont anxiety meds         Problems Reconciled  Plan  · Orders  o Iron panel (iron, TIBC, transferrin saturation) (84764, 58708, 17600) - 780.79/R53.83 - 04/21/2021  o B12 Folate levels (B12FO) - 780.79/R53.83 - 04/21/2021  o CBC with Auto Diff [facility] (46171) - 576.1/K83.0, 556.9/K51.90, 579.0/K90.0 - 04/21/2021  o CMP [facility] (77275) - 576.1/K83.0, 556.9/K51.90, 579.0/K90.0 - 04/21/2021  o Lipid Panel [facility] (60136) - 576.1/K83.0, 556.9/K51.90, 579.0/K90.0 - 04/21/2021  o Thyroid Profile (52038, 73708, THYII) - 579.0/K90.0, 244.9/E03.9 - 04/21/2021  o ACO-39: Current medications updated and reviewed (1159F, ) - - 04/21/2021  o CT ABD&PELV with and without contrast (62964) - 576.1/K83.0, 556.9/K51.90, 579.0/K90.0 - 04/21/2021  o REANNA (antinuclear antibody profile) by enzyme immunoassay (50381) - 780.79/R53.83, 719.40/M25.50 - 04/21/2021  o CCP Ab (26040) - 576.1/K83.0, 556.9/K51.90, 719.40/M25.50 - 04/21/2021  o Rheumatoid Factor IgG, IgM, and IgA [facility] (47483) - 719.40/M25.50 - 04/21/2021  o Vitamin D (25-Hydroxy) Level (34302) - 780.79/R53.83, 719.40/M25.50, 579.0/K90.0 - 04/21/2021  o Folate (Folic Acid) Level (58711) - 579.0/K90.0, 780.79/R53.83, 719.40/M25.50 - 04/21/2021  o GASTROENTEROLOGY (GASTR) - 576.1/K83.0, 556.9/K51.90, 579.0/K90.0 - 04/21/2021   needs to get back in with Dr. Agrawal, no neal, but needs to see MD, not APRN as she is quite complicated  · Medications  o hydroxyzine HCl 25 mg oral tablet   SIG: one tab po BID   DISP: (120) Tablet with 1 refills  Adjusted on  "04/21/2021     o Mepilex 4 X 4 \" topical bandage   SIG: apply bandage to affected area(s) by topical route 2 times a day   DISP: (30) Pad with 2 refills  Adjusted on 04/21/2021     o Acidophilus-Pectin 75 million cell -100 mg oral capsule   SIG: TAKE 1 CAPSULE BY MOUTH TWICE DAILY   DISP: (60) Capsule with 2 refills  Refilled on 04/21/2021     o cyclobenzaprine 5 mg oral tablet   SIG: TAKE 1 TABLET BY MOUTH THREE TIMES DAILY   DISP: (90) Tablet with 2 refills  Refilled on 04/21/2021     o ferrous sulfate 325 mg (65 mg iron) oral tablet   SIG: TAKE 1 TABLET BY MOUTH TWICE DAILY   DISP: (60) Tablet with 2 refills  Refilled on 04/21/2021     o metronidazole 250 mg oral tablet   SIG: TAKE 1 TABLET BY MOUTH EVERY MORNING   DISP: (30) Tablet with 2 refills  Refilled on 04/21/2021     o mupirocin 2 % topical ointment   SIG: apply to affected area by external route 2 times a day for 30 days   DISP: (22) Gram with 4 refills  Refilled on 04/21/2021     o Oyster Shell Calcium-Vit D3 500 mg(1,250mg) -200 unit oral tablet   SIG: TAKE 1 TABLET BY MOUTH TWICE DAILY   DISP: (60) Tablet with 2 refills  Refilled on 04/21/2021     o Synthroid 75 mcg oral tablet   SIG: take 1 tablet (75 mcg) by oral route once daily for 90 days   DISP: (90) Tablet with 1 refills  Refilled on 04/21/2021     o ursodiol 300 mg oral capsule   SIG: TAKE 2 CAPSULES BY MOUTH TWICE DAILY   DISP: (120) Capsule with 2 refills  Refilled on 04/21/2021     o Zoloft 50 mg oral tablet   SIG: TAKE 1 TABLET BY MOUTH ONCE DAILY   DISP: (30) Tablet with 2 refills  Refilled on 04/21/2021     o sertraline 50 mg oral tablet   SIG: TAKE 1 TABLET BY MOUTH once DAILY   DISP: (30) Tablet with -1 refills  Discontinued on 04/21/2021     o Medications have been Reconciled  o Transition of Care or Provider Policy  · Instructions  o Patient was educated/instructed on their diagnosis, treatment and medications prior to discharge from the clinic today.  · Disposition  o 6 Week Follow " Up            Electronically Signed by: Demi Fernandez MD -Author on April 21, 2021 04:07:30 PM

## 2021-05-15 VITALS
HEART RATE: 130 BPM | OXYGEN SATURATION: 98 % | BODY MASS INDEX: 23.29 KG/M2 | HEIGHT: 61 IN | WEIGHT: 123.37 LBS | DIASTOLIC BLOOD PRESSURE: 64 MMHG | TEMPERATURE: 98.6 F | SYSTOLIC BLOOD PRESSURE: 102 MMHG

## 2021-05-15 VITALS
HEART RATE: 120 BPM | OXYGEN SATURATION: 99 % | DIASTOLIC BLOOD PRESSURE: 62 MMHG | HEIGHT: 61 IN | SYSTOLIC BLOOD PRESSURE: 112 MMHG | WEIGHT: 119.5 LBS | TEMPERATURE: 99.6 F | BODY MASS INDEX: 22.56 KG/M2

## 2021-05-15 VITALS
TEMPERATURE: 98.1 F | DIASTOLIC BLOOD PRESSURE: 72 MMHG | OXYGEN SATURATION: 100 % | HEIGHT: 61 IN | WEIGHT: 123.5 LBS | HEART RATE: 78 BPM | SYSTOLIC BLOOD PRESSURE: 112 MMHG | BODY MASS INDEX: 23.32 KG/M2

## 2021-05-15 VITALS — RESPIRATION RATE: 16 BRPM | WEIGHT: 112 LBS | BODY MASS INDEX: 20.61 KG/M2 | HEIGHT: 62 IN

## 2021-05-15 VITALS — BODY MASS INDEX: 20.43 KG/M2 | HEIGHT: 62 IN | RESPIRATION RATE: 15 BRPM | WEIGHT: 111 LBS

## 2021-05-15 VITALS — RESPIRATION RATE: 16 BRPM | BODY MASS INDEX: 23.41 KG/M2 | HEIGHT: 61 IN | WEIGHT: 124 LBS

## 2021-05-15 VITALS
RESPIRATION RATE: 15 BRPM | WEIGHT: 123 LBS | SYSTOLIC BLOOD PRESSURE: 114 MMHG | HEART RATE: 112 BPM | OXYGEN SATURATION: 100 % | BODY MASS INDEX: 22.63 KG/M2 | TEMPERATURE: 98.7 F | DIASTOLIC BLOOD PRESSURE: 78 MMHG | HEIGHT: 62 IN

## 2021-05-15 VITALS — HEIGHT: 62 IN | RESPIRATION RATE: 12 BRPM | BODY MASS INDEX: 20.13 KG/M2 | WEIGHT: 109.37 LBS

## 2021-05-15 VITALS — WEIGHT: 122 LBS | HEIGHT: 61 IN | RESPIRATION RATE: 16 BRPM | BODY MASS INDEX: 23.03 KG/M2

## 2021-05-15 VITALS
HEART RATE: 113 BPM | TEMPERATURE: 97.9 F | DIASTOLIC BLOOD PRESSURE: 78 MMHG | OXYGEN SATURATION: 98 % | SYSTOLIC BLOOD PRESSURE: 114 MMHG | BODY MASS INDEX: 22.08 KG/M2 | WEIGHT: 120 LBS | HEIGHT: 62 IN

## 2021-05-15 VITALS
BODY MASS INDEX: 21.53 KG/M2 | DIASTOLIC BLOOD PRESSURE: 62 MMHG | HEART RATE: 113 BPM | OXYGEN SATURATION: 97 % | WEIGHT: 117 LBS | SYSTOLIC BLOOD PRESSURE: 92 MMHG | HEIGHT: 62 IN | TEMPERATURE: 99 F

## 2021-05-15 VITALS
WEIGHT: 126.37 LBS | DIASTOLIC BLOOD PRESSURE: 72 MMHG | TEMPERATURE: 98.7 F | SYSTOLIC BLOOD PRESSURE: 108 MMHG | OXYGEN SATURATION: 99 % | HEIGHT: 62 IN | HEART RATE: 87 BPM | BODY MASS INDEX: 23.25 KG/M2

## 2021-05-15 VITALS
HEART RATE: 114 BPM | SYSTOLIC BLOOD PRESSURE: 101 MMHG | HEIGHT: 62 IN | DIASTOLIC BLOOD PRESSURE: 66 MMHG | RESPIRATION RATE: 15 BRPM | OXYGEN SATURATION: 98 % | WEIGHT: 121.5 LBS | TEMPERATURE: 98.4 F | BODY MASS INDEX: 22.36 KG/M2

## 2021-05-15 VITALS — RESPIRATION RATE: 14 BRPM | HEIGHT: 62 IN | WEIGHT: 112 LBS | BODY MASS INDEX: 20.61 KG/M2

## 2021-05-15 VITALS — BODY MASS INDEX: 20.24 KG/M2 | RESPIRATION RATE: 16 BRPM | HEIGHT: 62 IN | WEIGHT: 110 LBS

## 2021-05-15 VITALS — BODY MASS INDEX: 21.16 KG/M2 | WEIGHT: 115 LBS | RESPIRATION RATE: 16 BRPM | HEIGHT: 62 IN

## 2021-05-15 VITALS
OXYGEN SATURATION: 98 % | HEIGHT: 62 IN | DIASTOLIC BLOOD PRESSURE: 68 MMHG | TEMPERATURE: 98.4 F | WEIGHT: 112.37 LBS | SYSTOLIC BLOOD PRESSURE: 118 MMHG | HEART RATE: 128 BPM | BODY MASS INDEX: 20.68 KG/M2

## 2021-05-15 VITALS — WEIGHT: 122 LBS | RESPIRATION RATE: 14 BRPM | BODY MASS INDEX: 23.03 KG/M2 | HEIGHT: 61 IN

## 2021-05-16 VITALS
DIASTOLIC BLOOD PRESSURE: 68 MMHG | TEMPERATURE: 97.8 F | SYSTOLIC BLOOD PRESSURE: 92 MMHG | HEART RATE: 87 BPM | HEIGHT: 62 IN | BODY MASS INDEX: 21.92 KG/M2 | OXYGEN SATURATION: 99 % | RESPIRATION RATE: 16 BRPM | WEIGHT: 119.12 LBS

## 2021-05-16 VITALS
RESPIRATION RATE: 16 BRPM | HEIGHT: 62 IN | HEART RATE: 83 BPM | BODY MASS INDEX: 21.76 KG/M2 | SYSTOLIC BLOOD PRESSURE: 90 MMHG | DIASTOLIC BLOOD PRESSURE: 62 MMHG | TEMPERATURE: 97.1 F | OXYGEN SATURATION: 99 % | WEIGHT: 118.25 LBS

## 2021-05-16 VITALS
DIASTOLIC BLOOD PRESSURE: 64 MMHG | TEMPERATURE: 97.9 F | SYSTOLIC BLOOD PRESSURE: 112 MMHG | BODY MASS INDEX: 22.45 KG/M2 | HEIGHT: 62 IN | WEIGHT: 122 LBS | OXYGEN SATURATION: 99 % | HEART RATE: 74 BPM | RESPIRATION RATE: 16 BRPM

## 2021-05-16 VITALS
HEART RATE: 96 BPM | TEMPERATURE: 100 F | SYSTOLIC BLOOD PRESSURE: 116 MMHG | WEIGHT: 122.12 LBS | DIASTOLIC BLOOD PRESSURE: 66 MMHG | OXYGEN SATURATION: 100 % | RESPIRATION RATE: 16 BRPM | HEIGHT: 62 IN | BODY MASS INDEX: 22.47 KG/M2

## 2021-05-16 VITALS
HEIGHT: 62 IN | DIASTOLIC BLOOD PRESSURE: 72 MMHG | SYSTOLIC BLOOD PRESSURE: 110 MMHG | OXYGEN SATURATION: 100 % | WEIGHT: 122.12 LBS | RESPIRATION RATE: 16 BRPM | TEMPERATURE: 98.2 F | BODY MASS INDEX: 22.47 KG/M2 | HEART RATE: 114 BPM

## 2021-05-18 ENCOUNTER — OFFICE VISIT CONVERTED (OUTPATIENT)
Dept: GASTROENTEROLOGY | Facility: CLINIC | Age: 26
End: 2021-05-18
Attending: NURSE PRACTITIONER

## 2021-06-05 NOTE — H&P
History and Physical      Patient Name: Claudia Wilkins   Patient ID: [id number]   Sex: Female   YOB: 1995    Primary Care Provider: Demi Fernandez MD   Referring Provider: Demi Fernandez MD    Visit Date: May 18, 2021    Provider: ERIK Richards   Location: [facility]   Location Address: [address] [place] [address], [address]  [place], [place]  [id number]   Location Phone: [phone]          Chief Complaint     rectal pain       History Of Present Illness  The patient is a 25 year old /[place] Native female who presents on referral from Demi Fernandez MD for a gastroenterology evaluation.      Ms. Wilkins was diagnosed with UC in 1999 and subsequently underwent colectomy with J pouch per Dr. Davis in 2009.  Since that time she has had recurrent episodes of pouchitis and has been evaluated by several different gastroenterologists.  Her Mother is present at today's visit and provides most of patient history.  States that she's been on flagyl for many years for pouchitis.  They have tried to taper off, but have been unsuccessful with this.  She reports compliance with daily probiotics.       She was last evaluated by Dr. Agrawal in 2017 at which time she underwent sigmoidoscopy on 2/22/2017.  This revealed normal small bowel to 60 cm.  Mild patchy erythema with a single aphthous ulcer noted in the J-pouch.  Anastomosis visualized at 15 cm.  Blind-ending J-pouch measured at 20 cm.  Small intestine biopsy with slight focal acute inflammation and reactive lymphoid hyperplasia.  No granuloma seen.  Pouch biopsy-moderate chronic inflammation, mild acute inflammation with occasional reactive lymphoid nodules.    At this time Dr. Agrawal recommended mesalamine enemas once daily and referral to Dr. Taveras for management of PSC.  Patient has been lost to follow-up since that time.    She reports that they have been avoiding follow up visits due to the pandemic  and trying to prevent COVID exposure.  She's unsure when she last saw hepatology.      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  She's undergone several procedures with Dr. Ocampo.  Unfortunately, this remains open and is draining and bothersome.  States that she has mostly liquid bowel movements.  If stools are any thicker than toothpaste consistency, she has a difficult time passing them.  Experiences rectal pain with bowel movements.  Reports occasional rectal bleeding.     5/13/2021 CT abdomen pelvis with contrast-liver, spleen and pancreas are unremarkable.  Gallbladder and biliary tree are unremarkable.  Postoperative changes noted consistent with history of prior colon resection and J-pouch.  No definite signs of bowel inflammation are seen at this time.    4/21/2021 vitamin D-29.4.  Iron profile: Iron saturation 41%, total iron 103.  CMP: Creatinine 0.67, alk phos 70, ALT 18, AST 22, total bilirubin 0.27.  CBC: WBC 6.12, hemoglobin 13.7, hematocrit 41.7, platelets 273    5/22/2020 rectal exam under anesthesia to evaluate perianal fistula.  Ligation of perianal fistula and placement of seton per Dr. Ocampo.                 Past Medical History  Allergic rhinitis; Anemia; Anxiety; Arthritis; Celiac disease; Chronic Fatigue Syndrome; Chronic liver disease; Chronic pain; Depression; Fibromyalgia; Gastric ulcer; Hemorrhoids; Hypermobility syndrome; Hypothyroidism; IBS (irritable bowel syndrome); Insomnia; Lactose intolerance; Moderate episode of recurrent major depressive disorder; Nasal crusting; Osteopenia; Pancreatitis; Pouchitis; Primary sclerosing cholangitis; Psychiatric Care; PTSD; RA (rheumatoid arthritis); Recurrent epistaxis; Scoliosis; Ulcerative colitis         Past Surgical History  Colectomy, open; Colonoscopy; Colostomy; EGD; Perirectal excision         Medication List  Acidophilus-Pectin 75 million cell -100 mg oral capsule; CBD oil; cyclobenzaprine 5 mg  "oral tablet; ferrous sulfate 325 mg (65 mg iron) oral tablet; hydroxyzine HCl 25 mg oral tablet; loperamide 2 mg oral capsule; Mepilex 4 X 4 \" topical bandage; metronidazole 250 mg oral tablet; mupirocin 2 % topical ointment; Oyster Shell Calcium-Vit D3 500 mg(1,250mg) -200 unit oral tablet; Synthroid 75 mcg oral tablet; ursodiol 300 mg oral capsule; Zoloft 50 mg oral tablet         Allergy List  Latex       Allergies Reconciled  Family Medical History  Rectal Neoplasm, Malignant; Stroke; Heart Disease; Colon Cancer; Prostate cancer; Kidney Disease; Diabetes         Social History  Alcohol (Never); Tobacco (Never)         Immunizations  Name Date Admin   DTaP 08/04/2000   DTaP 02/26/1998   DTaP 01/30/1997   DTaP 1996   DTaP 1996   Hepatitis B 01/30/1997   Hepatitis B 1996   Hepatitis B 1996   Hib 01/30/1997   Hib 1996   Hib 1996   HPV 09/01/2010   HPV 04/12/2009   HPV 06/04/2008   Influenza 01/29/2018   Influenza 12/12/2016   Influenza 10/27/2015   IPV 08/04/2000   IPV 01/30/1997   IPV 1996   IPV 1996   Meningococcal (MNG) 06/04/2008   MMR 08/04/2000   MMR 08/31/1997   Tdap 07/31/2007   Varicella 03/06/2000         Review of Systems  · Constitutional  o Denies  o : chills, fever  · Eyes  o Denies  o : blurred vision, changes in vision  · Cardiovascular  o Denies  o : chest pain, irregular heart beats  · Respiratory  o Denies  o : shortness of breath, cough  · Gastrointestinal  o Admits  o : See HPI  · Genitourinary  o Denies  o : dysuria, blood in urine  · Integument  o Denies  o : rash, new skin lesions  · Neurologic  o Denies  o : tingling or numbness, seizures  · Musculoskeletal  o Denies  o : joint pain, joint swelling  · Endocrine  o Denies  o : weight gain, weight loss  · Psychiatric  o Denies  o : anxiety, depression      Vitals  Date Time BP Position Site L\R Cuff Size HR RR TEMP (F) WT  HT  BMI kg/m2 BSA m2 O2 Sat FR L/min FiO2        05/18/2021 02:59 PM " "111/76 Sitting    106 - R  97.6 112lbs 8oz 5'  2\" 20.58 1.49             Physical Examination  · Constitutional  o Appearance  o : well developed, well-nourished, in no acute distress  · Eyes  o Vision  o :   § Visual Fields  § : eyes move symmetrical in all directions  o Sclerae  o : anicteric  o Pupils and Irises  o : pupils equal and symmetrical  · Neck  o Inspection/Palpation  o : supple  · Respiratory  o Respiratory Effort  o : breathing unlabored  o Inspection of Chest  o : normal appearance, no retractions  o Auscultation of Lungs  o : clear to auscultation bilaterally  · Cardiovascular  o Heart  o :   § Auscultation of Heart  § : no murmurs, gallops or rubs  · Gastrointestinal  o Abdominal Examination  o : soft, nontender to palpation, with normal active bowel sounds, no appreciable hepatosplenomegaly  o Digital Rectal Exam  o : deferred  · Lymphatic  o Neck  o : no palpable lymphadenopathy  · Skin and Subcutaneous Tissue  o General Inspection  o : without focal lesions; turgor is normal  · Psychiatric  o General  o : Alert and oriented x3  o Mood and Affect  o : Mood and affect are appropriate to circumstances  · Extremities  o Extremities  o : No edema, no cyanosis          Assessment  · Abdominal bloating     787.3/R14.0  · Abdominal pain     789.00/R10.9  · Primary sclerosing cholangitis     576.1/K83.01  · Perianal fistula     565.1/K60.3  · History of chronic ulcerative colitis     V12.79/Z87.19  · History of total colectomy     V45.89/Z90.49  · Rectal pain     569.42/K62.89      Plan  · Orders  o COLORECTAL SURGEON CONSULT (COLSU) - - 05/19/2021  · Medications  o Medications have been Reconciled  · Instructions  o Information given on current diagnoses. Discussed with patient and mother due to the complexity of her case, I would recommend she be evaluated by colorectal surgery for chronic pouchitis and jaime-anal fistula. She has a difficult time with transportation in [place], but is agreeable to " this plan. Will hold on scheduling colonoscopy for now pending referral. If c/r surgery is okay with us performing colonoscopy, she will call and this can scheduled here.   · Disposition  o Follow up PRN - Call if any change in bowel pattern, abdominal pain, rectal bleeding, or any new GI complaint.            Electronically Signed by: ERIK Richards -Author on May 19, 2021 12:23:49 PM

## 2021-07-15 VITALS
DIASTOLIC BLOOD PRESSURE: 76 MMHG | TEMPERATURE: 97.6 F | HEART RATE: 106 BPM | WEIGHT: 112.5 LBS | BODY MASS INDEX: 20.7 KG/M2 | SYSTOLIC BLOOD PRESSURE: 111 MMHG | HEIGHT: 62 IN

## 2021-07-26 RX ORDER — LOPERAMIDE HYDROCHLORIDE 2 MG/1
2 CAPSULE ORAL EVERY 6 HOURS PRN
COMMUNITY
Start: 2021-04-21 | End: 2021-07-26 | Stop reason: SDUPTHER

## 2021-07-26 NOTE — TELEPHONE ENCOUNTER
Pharmacy called and said patient has a script for loperamide 2mg oral capsule q6 hours and dispensing 60 tabs she is needing a refill before the months is out.    Called patient and she is taking 2 in the am and 2 in the afternoon then having 2 in the middle of the day a lot of days. Wanting a refill of more than the dispense of 60, dispense on refill is at 180, if this is ok please send medication in to pharmacy.

## 2021-07-27 RX ORDER — URSODIOL 300 MG/1
CAPSULE ORAL
Qty: 120 CAPSULE | Refills: 0 | Status: SHIPPED | OUTPATIENT
Start: 2021-07-27 | End: 2021-09-02

## 2021-07-27 RX ORDER — GARLIC EXTRACT 500 MG
CAPSULE ORAL
Qty: 180 EACH | Refills: 0 | Status: SHIPPED | OUTPATIENT
Start: 2021-07-27 | End: 2021-11-24 | Stop reason: SDUPTHER

## 2021-07-27 RX ORDER — METRONIDAZOLE 250 MG/1
TABLET ORAL
Qty: 30 TABLET | Refills: 0 | Status: SHIPPED | OUTPATIENT
Start: 2021-07-27 | End: 2021-09-02

## 2021-07-27 RX ORDER — LOPERAMIDE HYDROCHLORIDE 2 MG/1
2 CAPSULE ORAL EVERY 6 HOURS PRN
Qty: 180 CAPSULE | Refills: 1 | Status: SHIPPED | OUTPATIENT
Start: 2021-07-27 | End: 2021-07-27 | Stop reason: SDUPTHER

## 2021-07-27 RX ORDER — LOPERAMIDE HYDROCHLORIDE 2 MG/1
CAPSULE ORAL
Qty: 60 CAPSULE | Refills: 0 | Status: SHIPPED | OUTPATIENT
Start: 2021-07-27 | End: 2021-08-20

## 2021-07-27 RX ORDER — FERROUS SULFATE 325(65) MG
TABLET ORAL
Qty: 60 TABLET | Refills: 0 | Status: SHIPPED | OUTPATIENT
Start: 2021-07-27 | End: 2021-09-02

## 2021-07-27 RX ORDER — CYCLOBENZAPRINE HCL 5 MG
TABLET ORAL
Qty: 90 TABLET | Refills: 0 | Status: SHIPPED | OUTPATIENT
Start: 2021-07-27 | End: 2021-09-02

## 2021-08-20 RX ORDER — LOPERAMIDE HYDROCHLORIDE 2 MG/1
CAPSULE ORAL
Qty: 60 CAPSULE | Refills: 0 | Status: SHIPPED | OUTPATIENT
Start: 2021-08-20 | End: 2021-08-30

## 2021-08-30 RX ORDER — LOPERAMIDE HYDROCHLORIDE 2 MG/1
CAPSULE ORAL
Qty: 60 CAPSULE | Refills: 0 | Status: SHIPPED | OUTPATIENT
Start: 2021-08-30 | End: 2021-10-11

## 2021-09-02 RX ORDER — CYCLOBENZAPRINE HCL 5 MG
TABLET ORAL
Qty: 90 TABLET | Refills: 0 | Status: SHIPPED | OUTPATIENT
Start: 2021-09-02 | End: 2021-12-09 | Stop reason: SDUPTHER

## 2021-09-02 RX ORDER — FERROUS SULFATE 325(65) MG
TABLET ORAL
Qty: 60 TABLET | Refills: 0 | Status: SHIPPED | OUTPATIENT
Start: 2021-09-02 | End: 2021-11-24 | Stop reason: SDUPTHER

## 2021-09-02 RX ORDER — URSODIOL 300 MG/1
CAPSULE ORAL
Qty: 120 CAPSULE | Refills: 0 | Status: SHIPPED | OUTPATIENT
Start: 2021-09-02 | End: 2021-11-24 | Stop reason: SDUPTHER

## 2021-09-02 RX ORDER — METRONIDAZOLE 250 MG/1
TABLET ORAL
Qty: 30 TABLET | Refills: 0 | Status: SHIPPED | OUTPATIENT
Start: 2021-09-02 | End: 2021-12-09 | Stop reason: SDUPTHER

## 2021-10-11 RX ORDER — LOPERAMIDE HYDROCHLORIDE 2 MG/1
CAPSULE ORAL
Qty: 60 CAPSULE | Refills: 0 | Status: SHIPPED | OUTPATIENT
Start: 2021-10-11 | End: 2021-12-10 | Stop reason: SDUPTHER

## 2021-11-24 ENCOUNTER — TELEPHONE (OUTPATIENT)
Dept: INTERNAL MEDICINE | Facility: CLINIC | Age: 26
End: 2021-11-24

## 2021-11-24 RX ORDER — URSODIOL 300 MG/1
600 CAPSULE ORAL 2 TIMES DAILY
Qty: 120 CAPSULE | Refills: 0 | Status: SHIPPED | OUTPATIENT
Start: 2021-11-24 | End: 2022-02-21 | Stop reason: SDUPTHER

## 2021-11-24 RX ORDER — GARLIC EXTRACT 500 MG
1 CAPSULE ORAL 2 TIMES DAILY
Qty: 180 EACH | Refills: 0 | Status: SHIPPED | OUTPATIENT
Start: 2021-11-24 | End: 2022-02-21 | Stop reason: SDUPTHER

## 2021-11-24 RX ORDER — FERROUS SULFATE 325(65) MG
1 TABLET ORAL 2 TIMES DAILY
Qty: 60 TABLET | Refills: 0 | Status: SHIPPED | OUTPATIENT
Start: 2021-11-24 | End: 2022-02-21 | Stop reason: SDUPTHER

## 2021-11-24 NOTE — TELEPHONE ENCOUNTER
Please send in what ever medications they need however tell them we need them to keep their appointments.

## 2021-11-24 NOTE — TELEPHONE ENCOUNTER
Pt's mother called today requesting all prescriptions be refilled. She was very frustrated with our office bc her prescriptions were denied due to needing an apt, Elle called her in September and told her [place]iana would need to be seen prior to sending in refills again. Pt's mother stated her children have been with out medication for over a month, pt's mother cancelled her follow up in June. Please advise, pt is now scheduled for 12/10/21

## 2021-12-09 RX ORDER — LOPERAMIDE HYDROCHLORIDE 2 MG/1
CAPSULE ORAL
Qty: 60 CAPSULE | Refills: 0 | Status: CANCELLED | OUTPATIENT
Start: 2021-12-09

## 2021-12-10 ENCOUNTER — OFFICE VISIT (OUTPATIENT)
Dept: INTERNAL MEDICINE | Facility: CLINIC | Age: 26
End: 2021-12-10

## 2021-12-10 VITALS
WEIGHT: 119 LBS | HEIGHT: 61 IN | BODY MASS INDEX: 22.47 KG/M2 | HEART RATE: 141 BPM | DIASTOLIC BLOOD PRESSURE: 85 MMHG | OXYGEN SATURATION: 100 % | SYSTOLIC BLOOD PRESSURE: 124 MMHG | TEMPERATURE: 97.7 F

## 2021-12-10 DIAGNOSIS — F41.9 ANXIETY: ICD-10-CM

## 2021-12-10 DIAGNOSIS — K50.113 CROHN'S DISEASE OF LARGE INTESTINE WITH FISTULA (HCC): ICD-10-CM

## 2021-12-10 DIAGNOSIS — K50.914 CROHN'S DISEASE WITH ABSCESS, UNSPECIFIED GASTROINTESTINAL TRACT LOCATION (HCC): ICD-10-CM

## 2021-12-10 DIAGNOSIS — F33.1 MODERATE EPISODE OF RECURRENT MAJOR DEPRESSIVE DISORDER (HCC): Primary | ICD-10-CM

## 2021-12-10 PROBLEM — M85.80 OSTEOPENIA: Status: ACTIVE | Noted: 2021-09-28

## 2021-12-10 PROBLEM — J30.2 SEASONAL ALLERGIES: Status: ACTIVE | Noted: 2021-12-10

## 2021-12-10 PROBLEM — K90.0 CELIAC DISEASE: Status: ACTIVE | Noted: 2021-12-10

## 2021-12-10 PROBLEM — F32.A DEPRESSION: Status: ACTIVE | Noted: 2021-12-10

## 2021-12-10 PROBLEM — M06.9 RHEUMATOID ARTHRITIS: Status: ACTIVE | Noted: 2021-12-10

## 2021-12-10 PROBLEM — M79.7 FIBROMYALGIA: Status: ACTIVE | Noted: 2021-09-28

## 2021-12-10 PROBLEM — M41.9 SCOLIOSIS: Status: ACTIVE | Noted: 2021-12-10

## 2021-12-10 PROBLEM — J30.9 ALLERGIC RHINITIS: Status: ACTIVE | Noted: 2021-12-10

## 2021-12-10 PROBLEM — M35.7 HYPERMOBILITY SYNDROME: Status: ACTIVE | Noted: 2021-09-28

## 2021-12-10 PROBLEM — F43.10 POSTTRAUMATIC STRESS DISORDER: Status: ACTIVE | Noted: 2021-09-28

## 2021-12-10 PROBLEM — G47.00 INSOMNIA: Status: ACTIVE | Noted: 2021-12-10

## 2021-12-10 PROBLEM — K91.850 ILEAL POUCHITIS: Status: ACTIVE | Noted: 2021-12-10

## 2021-12-10 PROBLEM — D64.9 ANEMIA: Status: ACTIVE | Noted: 2021-12-10

## 2021-12-10 PROBLEM — K64.9 HEMORRHOIDS: Status: ACTIVE | Noted: 2021-12-10

## 2021-12-10 PROBLEM — K60.30 ANAL FISTULA: Status: ACTIVE | Noted: 2021-12-10

## 2021-12-10 PROBLEM — K58.9 IBS (IRRITABLE BOWEL SYNDROME): Status: ACTIVE | Noted: 2021-12-10

## 2021-12-10 PROBLEM — E03.9 HYPOTHYROIDISM: Status: ACTIVE | Noted: 2021-12-10

## 2021-12-10 PROBLEM — E73.9 LACTOSE INTOLERANCE: Status: ACTIVE | Noted: 2021-12-10

## 2021-12-10 PROBLEM — K76.9 CHRONIC LIVER DISEASE: Status: ACTIVE | Noted: 2021-12-10

## 2021-12-10 PROBLEM — K60.3 ANAL FISTULA: Status: ACTIVE | Noted: 2021-12-10

## 2021-12-10 PROCEDURE — G0008 ADMIN INFLUENZA VIRUS VAC: HCPCS | Performed by: INTERNAL MEDICINE

## 2021-12-10 PROCEDURE — 90715 TDAP VACCINE 7 YRS/> IM: CPT | Performed by: INTERNAL MEDICINE

## 2021-12-10 PROCEDURE — 90471 IMMUNIZATION ADMIN: CPT | Performed by: INTERNAL MEDICINE

## 2021-12-10 PROCEDURE — 90686 IIV4 VACC NO PRSV 0.5 ML IM: CPT | Performed by: INTERNAL MEDICINE

## 2021-12-10 PROCEDURE — 99214 OFFICE O/P EST MOD 30 MIN: CPT | Performed by: INTERNAL MEDICINE

## 2021-12-10 RX ORDER — LOPERAMIDE HYDROCHLORIDE 2 MG/1
4 CAPSULE ORAL 4 TIMES DAILY PRN
Qty: 240 CAPSULE | Refills: 3 | Status: SHIPPED | OUTPATIENT
Start: 2021-12-10 | End: 2022-03-31 | Stop reason: SDUPTHER

## 2021-12-10 RX ORDER — LEVOTHYROXINE SODIUM 0.07 MG/1
75 TABLET ORAL DAILY
COMMUNITY
End: 2022-02-21 | Stop reason: SDUPTHER

## 2021-12-10 NOTE — PROGRESS NOTES
"Chief Complaint  Med Refill and Follow-up (wellcare check up)    Subjective          Claudia Wilkins presents to [facility]  History of Present Illness  For crohns  Seeing Dr. Gardner  She has changed her diagnosis from ulcerative colitis to Crohn's  They are thinking about doing a lift    No chest pain  No trouble breathing    Overall feels fine anxiety is much better controlled on current medicines does not feel like she needs to change anxiety medicines at this time    No other concerns for today    Objective   Vital Signs:   /85   Pulse (!) 141   Temp 97.7 °F (36.5 °C)   Ht 154.9 cm (61\")   Wt 54 kg (119 lb)   SpO2 100%   BMI 22.48 kg/m²     Physical Exam  Vitals reviewed.   Constitutional:       Appearance: Normal appearance. She is well-developed.   HENT:      Head: Normocephalic and atraumatic.      Right Ear: External ear normal.      Left Ear: External ear normal.   Eyes:      Conjunctiva/sclera: Conjunctivae normal.      Pupils: Pupils are equal, round, and reactive to light.   Cardiovascular:      Rate and Rhythm: Normal rate and regular rhythm.      Heart sounds: No murmur heard.  No friction rub. No gallop.    Pulmonary:      Effort: Pulmonary effort is normal.      Breath sounds: Normal breath sounds. No wheezing or rhonchi.   Abdominal:      General: Abdomen is flat.      Palpations: Abdomen is soft.      Tenderness: There is abdominal tenderness.   Skin:     General: Skin is warm and dry.   Neurological:      Mental Status: She is alert and oriented to person, place, and time.      Cranial Nerves: No cranial nerve deficit.   Psychiatric:         Mood and Affect: Affect normal.         Behavior: Behavior normal.         Thought Content: Thought content normal.        Result Review :     Common labs    Common Labsle 4/21/21 4/21/21 4/21/21    1600 1600 1600   Glucose  67    BUN  10    Creatinine  0.67    Sodium  137    Potassium  4.0  "   Chloride  103    Calcium  9.4    Albumin  4.4    Total Bilirubin  0.27    Alkaline Phosphatase  70    AST (SGOT)  22    ALT (SGPT)  18    WBC 6.12     Hemoglobin 13.7     Hematocrit 41.7     Platelets 273     Total Cholesterol   134   Triglycerides   44   HDL Cholesterol   71 (A)   LDL Cholesterol    54 (A)   (A) Abnormal value       Comments are available for some flowsheets but are not being displayed.              Procedures      Assessment and Plan    Diagnoses and all orders for this visit:    1. Moderate episode of recurrent major depressive disorder (HCC) (Primary)  Comments:  well controlled on current meds    2. Crohn's disease of large intestine with fistula (HCC)    3. Anxiety    4. Crohn's disease with abscess, unspecified gastrointestinal tract location (HCC)  Comments:  per her surgeon who is acting as GI she has changed her diagnosis from UC to crohns; they are continuing to work with her discussing options    Other orders  -     Tdap Vaccine Greater Than or Equal To 6yo IM  -     FluLaval/Fluarix/Fluzone >6 Months  -     loperamide (IMODIUM) 2 MG capsule; Take 2 capsules by mouth 4 (Four) Times a Day As Needed for Diarrhea.  Dispense: 240 capsule; Refill: 3      Will give needed vaccines today          Follow Up   Return in about 4 months (around 4/10/2022).  Patient was given instructions and counseling regarding her condition or for health maintenance advice. Please see specific information pulled into the AVS if appropriate.

## 2021-12-13 RX ORDER — FERROUS SULFATE 325(65) MG
1 TABLET ORAL 2 TIMES DAILY
Qty: 60 TABLET | Refills: 0 | OUTPATIENT
Start: 2021-12-13

## 2021-12-13 RX ORDER — CYCLOBENZAPRINE HCL 5 MG
5 TABLET ORAL 3 TIMES DAILY
Qty: 90 TABLET | Refills: 0 | Status: SHIPPED | OUTPATIENT
Start: 2021-12-13 | End: 2022-01-24

## 2021-12-13 RX ORDER — GARLIC EXTRACT 500 MG
1 CAPSULE ORAL 2 TIMES DAILY
Qty: 180 EACH | Refills: 0 | OUTPATIENT
Start: 2021-12-13

## 2021-12-13 RX ORDER — METRONIDAZOLE 250 MG/1
250 TABLET ORAL EVERY MORNING
Qty: 30 TABLET | Refills: 0 | Status: SHIPPED | OUTPATIENT
Start: 2021-12-13 | End: 2022-01-24

## 2021-12-13 RX ORDER — URSODIOL 300 MG/1
600 CAPSULE ORAL 2 TIMES DAILY
Qty: 120 CAPSULE | Refills: 0 | OUTPATIENT
Start: 2021-12-13

## 2021-12-26 PROBLEM — F32.A DEPRESSION: Status: RESOLVED | Noted: 2021-12-10 | Resolved: 2021-12-26

## 2022-01-24 RX ORDER — CYCLOBENZAPRINE HCL 5 MG
TABLET ORAL
Qty: 90 TABLET | Refills: 0 | Status: SHIPPED | OUTPATIENT
Start: 2022-01-24 | End: 2022-02-21 | Stop reason: SDUPTHER

## 2022-01-24 RX ORDER — METRONIDAZOLE 250 MG/1
250 TABLET ORAL EVERY MORNING
Qty: 30 TABLET | Refills: 0 | Status: SHIPPED | OUTPATIENT
Start: 2022-01-24 | End: 2022-02-21 | Stop reason: SDUPTHER

## 2022-02-03 RX ORDER — HYDROXYZINE HYDROCHLORIDE 25 MG/1
TABLET, FILM COATED ORAL
Qty: 180 TABLET | Refills: 1 | Status: SHIPPED | OUTPATIENT
Start: 2022-02-03 | End: 2022-03-31 | Stop reason: SDUPTHER

## 2022-02-21 ENCOUNTER — TELEPHONE (OUTPATIENT)
Dept: INTERNAL MEDICINE | Facility: CLINIC | Age: 27
End: 2022-02-21

## 2022-02-21 RX ORDER — FERROUS SULFATE 325(65) MG
1 TABLET ORAL 2 TIMES DAILY
Qty: 60 TABLET | Refills: 0 | Status: SHIPPED | OUTPATIENT
Start: 2022-02-21 | End: 2022-03-31 | Stop reason: SDUPTHER

## 2022-02-21 RX ORDER — LEVOTHYROXINE SODIUM 0.07 MG/1
75 TABLET ORAL DAILY
Qty: 30 TABLET | Refills: 0 | Status: SHIPPED | OUTPATIENT
Start: 2022-02-21 | End: 2022-04-13

## 2022-02-21 RX ORDER — GARLIC EXTRACT 500 MG
1 CAPSULE ORAL 2 TIMES DAILY
Qty: 180 EACH | Refills: 0 | Status: SHIPPED | OUTPATIENT
Start: 2022-02-21 | End: 2022-05-16

## 2022-02-21 NOTE — TELEPHONE ENCOUNTER
Red rule verified and correct.    Mother stating pt needs refills on her meds.  Calcium-Vit D  flexeril  Iron  lactobacillis  Flagyl  Sertraline  synthroid  Ursodiol    Due for labs    Requested Prescriptions     Pending Prescriptions Disp Refills   • cyclobenzaprine (FLEXERIL) 5 MG tablet 90 tablet 0     Sig: Take 1 tablet by mouth 3 (Three) Times a Day.   • metroNIDAZOLE (FLAGYL) 250 MG tablet 30 tablet 0     Sig: Take 1 tablet by mouth Every Morning.   • ursodiol (ACTIGALL) 300 MG capsule 120 capsule 0     Sig: Take 2 capsules by mouth 2 (Two) Times a Day.     Signed Prescriptions Disp Refills   • Calcium Carbonate-Vitamin D (calcium-vitamin D) 500-200 MG-UNIT tablet per tablet 60 tablet 0     Sig: Take 1 tablet by mouth 2 (Two) Times a Day.     Authorizing Provider: ERIKA ESCOBEDO     Ordering User: BOYD MICHAEL   • ferrous sulfate (FeroSul) 325 (65 FE) MG tablet 60 tablet 0     Sig: Take 1 tablet by mouth 2 (Two) Times a Day.     Authorizing Provider: ERIKA ESCOBEDO     Ordering User: BOYD MICHAEL   • Lactobacillus Acid-Pectin (Acidophilus/Pectin) capsule 180 each 0     Sig: Take 1 capsule by mouth 2 (Two) Times a Day.     Authorizing Provider: ERIKA ESCOBEDO     Ordering User: BOYD MICHAEL   • levothyroxine (SYNTHROID, LEVOTHROID) 75 MCG tablet 30 tablet 0     Sig: Take 1 tablet by mouth Daily.     Authorizing Provider: ERIKA ESCOBEDO     Ordering User: BOYD MICHAEL   • sertraline (ZOLOFT) 50 MG tablet 30 tablet 2     Sig: Take 1 tablet by mouth Daily.     Authorizing Provider: ERIKA ESCOBEDO     Ordering User: BOYD MICHAEL

## 2022-02-22 ENCOUNTER — TELEPHONE (OUTPATIENT)
Dept: INTERNAL MEDICINE | Facility: CLINIC | Age: 27
End: 2022-02-22

## 2022-02-22 RX ORDER — METRONIDAZOLE 250 MG/1
250 TABLET ORAL EVERY MORNING
Qty: 30 TABLET | Refills: 0 | Status: SHIPPED | OUTPATIENT
Start: 2022-02-22 | End: 2022-03-31 | Stop reason: SDUPTHER

## 2022-02-22 RX ORDER — URSODIOL 300 MG/1
600 CAPSULE ORAL 2 TIMES DAILY
Qty: 120 CAPSULE | Refills: 0 | Status: SHIPPED | OUTPATIENT
Start: 2022-02-22 | End: 2022-03-31 | Stop reason: SDUPTHER

## 2022-02-22 RX ORDER — URSODIOL 300 MG/1
CAPSULE ORAL
Qty: 120 CAPSULE | Refills: 0 | OUTPATIENT
Start: 2022-02-22

## 2022-02-22 RX ORDER — CYCLOBENZAPRINE HCL 5 MG
5 TABLET ORAL 3 TIMES DAILY
Qty: 90 TABLET | Refills: 0 | Status: SHIPPED | OUTPATIENT
Start: 2022-02-22 | End: 2022-04-14

## 2022-02-22 NOTE — TELEPHONE ENCOUNTER
Caller: ISABELA LU    Relationship to patient: Mother    Best call back number: [phone]     Patient is needing: PATIENT MOTHER WOULD LIKE A CALL BACK REGARDING MEDICATION REFILL.     ursodiol (ACTIGALL) 300 MG capsule    PLEASE ADVISE

## 2022-04-01 RX ORDER — URSODIOL 300 MG/1
600 CAPSULE ORAL 2 TIMES DAILY
Qty: 120 CAPSULE | Refills: 0 | Status: SHIPPED | OUTPATIENT
Start: 2022-04-01 | End: 2022-04-14

## 2022-04-01 RX ORDER — FERROUS SULFATE 325(65) MG
1 TABLET ORAL 2 TIMES DAILY
Qty: 60 TABLET | Refills: 0 | Status: SHIPPED | OUTPATIENT
Start: 2022-04-01 | End: 2022-04-14

## 2022-04-01 RX ORDER — METRONIDAZOLE 250 MG/1
250 TABLET ORAL EVERY MORNING
Qty: 30 TABLET | Refills: 0 | Status: SHIPPED | OUTPATIENT
Start: 2022-04-01 | End: 2022-05-17

## 2022-04-01 RX ORDER — LOPERAMIDE HYDROCHLORIDE 2 MG/1
4 CAPSULE ORAL 4 TIMES DAILY PRN
Qty: 240 CAPSULE | Refills: 3 | Status: SHIPPED | OUTPATIENT
Start: 2022-04-01 | End: 2022-09-08

## 2022-04-01 RX ORDER — HYDROXYZINE HYDROCHLORIDE 25 MG/1
25 TABLET, FILM COATED ORAL 2 TIMES DAILY
Qty: 180 TABLET | Refills: 1 | Status: SHIPPED | OUTPATIENT
Start: 2022-04-01 | End: 2022-09-08

## 2022-04-13 RX ORDER — LEVOTHYROXINE SODIUM 0.07 MG/1
75 TABLET ORAL DAILY
Qty: 30 TABLET | Refills: 0 | Status: SHIPPED | OUTPATIENT
Start: 2022-04-13 | End: 2022-04-19

## 2022-04-13 NOTE — TELEPHONE ENCOUNTER
Are you okay to refill the flexeril. The other medications have already been sent in. They were sent in a week ago.

## 2022-04-14 RX ORDER — URSODIOL 300 MG/1
CAPSULE ORAL
Qty: 120 CAPSULE | Refills: 0 | Status: SHIPPED | OUTPATIENT
Start: 2022-04-14 | End: 2022-06-14

## 2022-04-14 RX ORDER — CYCLOBENZAPRINE HCL 5 MG
TABLET ORAL
Qty: 90 TABLET | Refills: 0 | Status: SHIPPED | OUTPATIENT
Start: 2022-04-14 | End: 2022-05-17

## 2022-04-14 RX ORDER — PNV NO.95/FERROUS FUM/FOLIC AC 28MG-0.8MG
TABLET ORAL
Qty: 60 TABLET | Refills: 0 | Status: SHIPPED | OUTPATIENT
Start: 2022-04-14 | End: 2022-06-14

## 2022-04-18 ENCOUNTER — OFFICE VISIT (OUTPATIENT)
Dept: INTERNAL MEDICINE | Facility: CLINIC | Age: 27
End: 2022-04-18

## 2022-04-18 VITALS
DIASTOLIC BLOOD PRESSURE: 61 MMHG | RESPIRATION RATE: 17 BRPM | OXYGEN SATURATION: 100 % | TEMPERATURE: 98.4 F | BODY MASS INDEX: 23.33 KG/M2 | HEIGHT: 61 IN | HEART RATE: 101 BPM | WEIGHT: 123.6 LBS | SYSTOLIC BLOOD PRESSURE: 101 MMHG

## 2022-04-18 DIAGNOSIS — R79.9 ABNORMAL FINDING OF BLOOD CHEMISTRY, UNSPECIFIED: ICD-10-CM

## 2022-04-18 DIAGNOSIS — E06.3 HYPOTHYROIDISM DUE TO HASHIMOTO'S THYROIDITIS: ICD-10-CM

## 2022-04-18 DIAGNOSIS — Z11.59 NEED FOR HEPATITIS C SCREENING TEST: ICD-10-CM

## 2022-04-18 DIAGNOSIS — M05.7A RHEUMATOID ARTHRITIS OF OTHER SITE WITH POSITIVE RHEUMATOID FACTOR: ICD-10-CM

## 2022-04-18 DIAGNOSIS — E55.9 VITAMIN D DEFICIENCY: ICD-10-CM

## 2022-04-18 DIAGNOSIS — F41.9 ANXIETY: ICD-10-CM

## 2022-04-18 DIAGNOSIS — E03.8 HYPOTHYROIDISM DUE TO HASHIMOTO'S THYROIDITIS: ICD-10-CM

## 2022-04-18 DIAGNOSIS — Z13.220 SCREENING, LIPID: ICD-10-CM

## 2022-04-18 DIAGNOSIS — K50.113 CROHN'S DISEASE OF LARGE INTESTINE WITH FISTULA: Primary | ICD-10-CM

## 2022-04-18 LAB
BASOPHILS # BLD AUTO: 0.1 10*3/MM3 (ref 0–0.2)
BASOPHILS NFR BLD AUTO: 1.5 % (ref 0–1.5)
CHOLEST SERPL-MCNC: 161 MG/DL (ref 0–200)
CHROMATIN AB SERPL-ACNC: <10 IU/ML (ref 0–14)
CRP SERPL-MCNC: <0.3 MG/DL (ref 0–0.5)
DEPRECATED RDW RBC AUTO: 37.9 FL (ref 37–54)
EOSINOPHIL # BLD AUTO: 0.34 10*3/MM3 (ref 0–0.4)
EOSINOPHIL NFR BLD AUTO: 5.2 % (ref 0.3–6.2)
ERYTHROCYTE [DISTWIDTH] IN BLOOD BY AUTOMATED COUNT: 11.1 % (ref 12.3–15.4)
ERYTHROCYTE [SEDIMENTATION RATE] IN BLOOD: 23 MM/HR (ref 0–20)
HCT VFR BLD AUTO: 44 % (ref 34–46.6)
HDLC SERPL-MCNC: 83 MG/DL (ref 40–60)
HGB BLD-MCNC: 14.8 G/DL (ref 12–15.9)
IMM GRANULOCYTES # BLD AUTO: 0.02 10*3/MM3 (ref 0–0.05)
IMM GRANULOCYTES NFR BLD AUTO: 0.3 % (ref 0–0.5)
IRON 24H UR-MRATE: 116 MCG/DL (ref 37–145)
IRON SATN MFR SERPL: 37 % (ref 20–50)
LDLC SERPL CALC-MCNC: 70 MG/DL (ref 0–100)
LDLC/HDLC SERPL: 0.86 {RATIO}
LYMPHOCYTES # BLD AUTO: 2.28 10*3/MM3 (ref 0.7–3.1)
LYMPHOCYTES NFR BLD AUTO: 35.2 % (ref 19.6–45.3)
MAGNESIUM SERPL-MCNC: 2 MG/DL (ref 1.6–2.6)
MCH RBC QN AUTO: 31.1 PG (ref 26.6–33)
MCHC RBC AUTO-ENTMCNC: 33.6 G/DL (ref 31.5–35.7)
MCV RBC AUTO: 92.4 FL (ref 79–97)
MONOCYTES # BLD AUTO: 0.49 10*3/MM3 (ref 0.1–0.9)
MONOCYTES NFR BLD AUTO: 7.6 % (ref 5–12)
NEUTROPHILS NFR BLD AUTO: 3.25 10*3/MM3 (ref 1.7–7)
NEUTROPHILS NFR BLD AUTO: 50.2 % (ref 42.7–76)
NRBC BLD AUTO-RTO: 0 /100 WBC (ref 0–0.2)
PLATELET # BLD AUTO: 332 10*3/MM3 (ref 140–450)
PMV BLD AUTO: 10.9 FL (ref 6–12)
RBC # BLD AUTO: 4.76 10*6/MM3 (ref 3.77–5.28)
T4 FREE SERPL-MCNC: 1.29 NG/DL (ref 0.93–1.7)
TIBC SERPL-MCNC: 317 MCG/DL (ref 298–536)
TRANSFERRIN SERPL-MCNC: 213 MG/DL (ref 200–360)
TRIGL SERPL-MCNC: 35 MG/DL (ref 0–150)
TSH SERPL DL<=0.05 MIU/L-ACNC: 8.03 UIU/ML (ref 0.27–4.2)
VLDLC SERPL-MCNC: 8 MG/DL (ref 5–40)
WBC NRBC COR # BLD: 6.48 10*3/MM3 (ref 3.4–10.8)

## 2022-04-18 PROCEDURE — 82607 VITAMIN B-12: CPT | Performed by: INTERNAL MEDICINE

## 2022-04-18 PROCEDURE — 36415 COLL VENOUS BLD VENIPUNCTURE: CPT | Performed by: INTERNAL MEDICINE

## 2022-04-18 PROCEDURE — 80053 COMPREHEN METABOLIC PANEL: CPT | Performed by: INTERNAL MEDICINE

## 2022-04-18 PROCEDURE — 84439 ASSAY OF FREE THYROXINE: CPT | Performed by: INTERNAL MEDICINE

## 2022-04-18 PROCEDURE — 86431 RHEUMATOID FACTOR QUANT: CPT | Performed by: INTERNAL MEDICINE

## 2022-04-18 PROCEDURE — 82746 ASSAY OF FOLIC ACID SERUM: CPT | Performed by: INTERNAL MEDICINE

## 2022-04-18 PROCEDURE — 99214 OFFICE O/P EST MOD 30 MIN: CPT | Performed by: INTERNAL MEDICINE

## 2022-04-18 PROCEDURE — 86200 CCP ANTIBODY: CPT | Performed by: INTERNAL MEDICINE

## 2022-04-18 PROCEDURE — 86140 C-REACTIVE PROTEIN: CPT | Performed by: INTERNAL MEDICINE

## 2022-04-18 PROCEDURE — 83540 ASSAY OF IRON: CPT | Performed by: INTERNAL MEDICINE

## 2022-04-18 PROCEDURE — 84466 ASSAY OF TRANSFERRIN: CPT | Performed by: INTERNAL MEDICINE

## 2022-04-18 PROCEDURE — 85652 RBC SED RATE AUTOMATED: CPT | Performed by: INTERNAL MEDICINE

## 2022-04-18 PROCEDURE — 85025 COMPLETE CBC W/AUTO DIFF WBC: CPT | Performed by: INTERNAL MEDICINE

## 2022-04-18 PROCEDURE — 80061 LIPID PANEL: CPT | Performed by: INTERNAL MEDICINE

## 2022-04-18 PROCEDURE — 86803 HEPATITIS C AB TEST: CPT | Performed by: INTERNAL MEDICINE

## 2022-04-18 PROCEDURE — 84443 ASSAY THYROID STIM HORMONE: CPT | Performed by: INTERNAL MEDICINE

## 2022-04-18 PROCEDURE — 83735 ASSAY OF MAGNESIUM: CPT | Performed by: INTERNAL MEDICINE

## 2022-04-18 PROCEDURE — 82306 VITAMIN D 25 HYDROXY: CPT | Performed by: INTERNAL MEDICINE

## 2022-04-18 NOTE — PROGRESS NOTES
"Chief Complaint  medication management (Mom wants better pain medication for her)    Subjective          Claudia Wilkins presents to [facility]  History of Present Illness     The patient presents today for a follow-up to talk about medications, as well as abdominal pain and anxiety. She is accompanied by her mother.     She reports she is having more pain lately with her muscles and arthritis. Notices more with weather changes. Feels pain in hips, shoulders, and knees. Her feet and ankles also hurt. She is taking Tylenol arthritis strength 2 tablets daily. Has tried another medication but is unsure of name. Her Rheumatoid factors have been positive. Pain is so severe at times she is unable to even cut up her food on he plate herself. Her crohn's surgeon is wanting to do surgery then medication like Humira, but she would like to try medications first.     They would like her drain evaluated. It is causing pain, rubbing, and rawness in the area.     She would like to been seen for colorectal treatment and possibly surgery if needed for her Crohn's disease locally because driving is an issue.    The patient reports taking Zoloft is going ok. There has been a couple of instances where she had anxiety, but used breathing exercises to help calm herself.     Her mother questions about poor circulation with color changes in the patients feet being noticeable at times.     Last labs were completed in 2021. She is still taking iron supplementation and vitamin D. She does have issues with eating, and has Celiac disease. Blood pressure is well-controlled today.     On occasion she will have chest tightness for 1 to 2 minutes.     She has drainage from her drain and has to wear liners at times due to this.     Objective   Vital Signs:   /61 (BP Location: Left arm, Patient Position: Sitting)   Pulse 101   Temp 98.4 °F (36.9 °C) (Oral)   Resp 17   Ht 154.9 cm (60.98\")   " Wt 56.1 kg (123 lb 9.6 oz)   SpO2 100%   BMI 23.37 kg/m²     Physical Exam  Vitals reviewed.   Constitutional:       Appearance: Normal appearance. She is well-developed.   HENT:      Head: Normocephalic and atraumatic.      Right Ear: External ear normal.      Left Ear: External ear normal.   Eyes:      Conjunctiva/sclera: Conjunctivae normal.      Pupils: Pupils are equal, round, and reactive to light.   Cardiovascular:      Rate and Rhythm: Normal rate and regular rhythm.      Heart sounds: No murmur heard.    No friction rub. No gallop.   Pulmonary:      Effort: Pulmonary effort is normal.      Breath sounds: Normal breath sounds. No wheezing or rhonchi.   Skin:     General: Skin is warm and dry.   Neurological:      Mental Status: She is alert and oriented to person, place, and time.      Cranial Nerves: No cranial nerve deficit.   Psychiatric:         Mood and Affect: Affect normal.         Behavior: Behavior normal.         Thought Content: Thought content normal.        Result Review :       Common labs    Common Labsle 4/18/22 4/18/22 4/18/22    1648 1648 1648   Glucose   71   BUN   7   Creatinine   0.67   Sodium   138   Potassium   4.1   Chloride   102   Calcium   9.8   Albumin   4.60   Total Bilirubin   0.5   Alkaline Phosphatase   102   AST (SGOT)   22   ALT (SGPT)   18   WBC 6.48     Hemoglobin 14.8     Hematocrit 44.0     Platelets 332     Total Cholesterol  161    Triglycerides  35    HDL Cholesterol  83 (A)    LDL Cholesterol   70    (A) Abnormal value                     Procedures        Assessment and Plan    Diagnoses and all orders for this visit:    1. Crohn's disease of large intestine with fistula (HCC) (Primary)  Comments:  Discuss that I will reach out with other providers to see what treatment option would be best at this point for her Crohn's and pain management. I prefer humira  Orders:  -     Rheumatoid Factor  -     Cyclic Citrul Peptide Antibody, IgG / IgA; Future  -      Comprehensive Metabolic Panel  -     CBC & Differential  -     TSH  -     Lipid Panel  -     T4, Free  -     Iron Profile  -     Vitamin D 25 Hydroxy  -     Vitamin B12 & Folate  -     Sedimentation Rate  -     C-reactive protein; Future  -     Magnesium  -     Cyclic Citrul Peptide Antibody, IgG / IgA  -     C-reactive protein    2. Anxiety  Comments:  well controlled on zoloft, cont for now  Orders:  -     Rheumatoid Factor  -     Cyclic Citrul Peptide Antibody, IgG / IgA; Future  -     Comprehensive Metabolic Panel  -     CBC & Differential  -     TSH  -     Lipid Panel  -     T4, Free  -     Iron Profile  -     Vitamin D 25 Hydroxy  -     Vitamin B12 & Folate  -     Cyclic Citrul Peptide Antibody, IgG / IgA    3. Vitamin D deficiency  Comments:  Will recommended multivitamin once lab results come back.   Orders:  -     Vitamin D 25 Hydroxy    4. 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   Orders:  -     Rheumatoid Factor  -     Cyclic Citrul Peptide Antibody, IgG / IgA; Future  -     Comprehensive Metabolic Panel  -     CBC & Differential  -     TSH  -     Lipid Panel  -     T4, Free  -     Iron Profile  -     Vitamin D 25 Hydroxy  -     Vitamin B12 & Folate  -     Sedimentation Rate  -     C-reactive protein; Future  -     Magnesium  -     Cyclic Citrul Peptide Antibody, IgG / IgA  -     C-reactive protein    5. Hypothyroidism due to Hashimoto's thyroiditis  Comments:  check labs and adjust as needed  Orders:  -     TSH  -     T4, Free    6. Need for hepatitis C screening test  -     Hepatitis C Antibody; Future  -     Hepatitis C Antibody    7. Screening, lipid  -     Lipid Panel    8. Abnormal finding of blood chemistry, unspecified   Comments:  Placed order for labs  Orders:  -     Lipid Panel                  Follow Up {Instructions Charge Capture   Follow-up Communications :23}  No follow-ups on file.  Patient was given instructions and counseling regarding her condition or for health maintenance advice. Please see specific information pulled into the AVS if appropriate.     Transcribed from ambient dictation for Demi Fernandez MD by Angelia Vaughn, [employer] Rep.  04/19/22   09:52 EDT    Patient verbalized consent to the visit recording.  I have personally performed the services described in this document as transcribed by the above individual, and it is both accurate and complete.  Demi Fernandez MD  4/21/2022  09:15 EDT

## 2022-04-19 DIAGNOSIS — E03.8 HYPOTHYROIDISM DUE TO HASHIMOTO'S THYROIDITIS: Primary | ICD-10-CM

## 2022-04-19 DIAGNOSIS — E06.3 HYPOTHYROIDISM DUE TO HASHIMOTO'S THYROIDITIS: Primary | ICD-10-CM

## 2022-04-19 LAB
25(OH)D3 SERPL-MCNC: 31.6 NG/ML (ref 30–100)
ALBUMIN SERPL-MCNC: 4.6 G/DL (ref 3.5–5.2)
ALBUMIN/GLOB SERPL: 1.2 G/DL
ALP SERPL-CCNC: 102 U/L (ref 39–117)
ALT SERPL W P-5'-P-CCNC: 18 U/L (ref 1–33)
ANION GAP SERPL CALCULATED.3IONS-SCNC: 12 MMOL/L (ref 5–15)
AST SERPL-CCNC: 22 U/L (ref 1–32)
BILIRUB SERPL-MCNC: 0.5 MG/DL (ref 0–1.2)
BUN SERPL-MCNC: 7 MG/DL (ref 6–20)
BUN/CREAT SERPL: 10.4 (ref 7–25)
CALCIUM SPEC-SCNC: 9.8 MG/DL (ref 8.6–10.5)
CHLORIDE SERPL-SCNC: 102 MMOL/L (ref 98–107)
CO2 SERPL-SCNC: 24 MMOL/L (ref 22–29)
CREAT SERPL-MCNC: 0.67 MG/DL (ref 0.57–1)
EGFRCR SERPLBLD CKD-EPI 2021: 124.6 ML/MIN/1.73
FOLATE SERPL-MCNC: 11.8 NG/ML (ref 4.78–24.2)
GLOBULIN UR ELPH-MCNC: 3.8 GM/DL
GLUCOSE SERPL-MCNC: 71 MG/DL (ref 65–99)
HCV AB SER DONR QL: NORMAL
POTASSIUM SERPL-SCNC: 4.1 MMOL/L (ref 3.5–5.2)
PROT SERPL-MCNC: 8.4 G/DL (ref 6–8.5)
SODIUM SERPL-SCNC: 138 MMOL/L (ref 136–145)
VIT B12 BLD-MCNC: 509 PG/ML (ref 211–946)

## 2022-04-19 RX ORDER — LEVOTHYROXINE SODIUM 88 UG/1
88 TABLET ORAL DAILY
Qty: 90 TABLET | Refills: 1 | Status: SHIPPED | OUTPATIENT
Start: 2022-04-19 | End: 2022-10-11

## 2022-04-21 LAB — CCP IGA+IGG SERPL IA-ACNC: 7 UNITS (ref 0–19)

## 2022-05-16 RX ORDER — GARLIC EXTRACT 500 MG
CAPSULE ORAL
Qty: 180 CAPSULE | Refills: 0 | Status: SHIPPED | OUTPATIENT
Start: 2022-05-16 | End: 2022-08-08

## 2022-05-17 RX ORDER — METRONIDAZOLE 250 MG/1
250 TABLET ORAL EVERY MORNING
Qty: 30 TABLET | Refills: 0 | Status: SHIPPED | OUTPATIENT
Start: 2022-05-17 | End: 2022-06-14

## 2022-05-17 RX ORDER — CYCLOBENZAPRINE HCL 5 MG
TABLET ORAL
Qty: 90 TABLET | Refills: 0 | Status: SHIPPED | OUTPATIENT
Start: 2022-05-17 | End: 2022-06-14

## 2022-06-14 RX ORDER — URSODIOL 300 MG/1
CAPSULE ORAL
Qty: 180 CAPSULE | Refills: 0 | Status: SHIPPED | OUTPATIENT
Start: 2022-06-14 | End: 2022-07-18

## 2022-06-14 RX ORDER — FERROUS SULFATE 325(65) MG
TABLET ORAL
Qty: 180 TABLET | Refills: 0 | Status: SHIPPED | OUTPATIENT
Start: 2022-06-14 | End: 2022-09-08

## 2022-06-14 RX ORDER — METRONIDAZOLE 250 MG/1
250 TABLET ORAL EVERY MORNING
Qty: 30 TABLET | Refills: 0 | Status: SHIPPED | OUTPATIENT
Start: 2022-06-14 | End: 2022-07-14

## 2022-06-14 RX ORDER — CYCLOBENZAPRINE HCL 5 MG
TABLET ORAL
Qty: 90 TABLET | Refills: 0 | Status: SHIPPED | OUTPATIENT
Start: 2022-06-14 | End: 2022-07-14

## 2022-06-14 NOTE — TELEPHONE ENCOUNTER
Cyclobenzaprine was filled on 05/17/22 #90 and Metronidazole was filled on 05/17/22 #30. If okay to send please let me know.

## 2022-07-14 RX ORDER — CYCLOBENZAPRINE HCL 5 MG
TABLET ORAL
Qty: 90 TABLET | Refills: 0 | Status: SHIPPED | OUTPATIENT
Start: 2022-07-14 | End: 2022-08-11

## 2022-07-14 RX ORDER — METRONIDAZOLE 250 MG/1
250 TABLET ORAL EVERY MORNING
Qty: 30 TABLET | Refills: 0 | Status: SHIPPED | OUTPATIENT
Start: 2022-07-14 | End: 2022-08-11

## 2022-07-18 RX ORDER — URSODIOL 300 MG/1
CAPSULE ORAL
Qty: 180 CAPSULE | Refills: 0 | Status: SHIPPED | OUTPATIENT
Start: 2022-07-18 | End: 2022-09-10

## 2022-08-08 RX ORDER — GARLIC EXTRACT 500 MG
CAPSULE ORAL
Qty: 180 CAPSULE | Refills: 3 | Status: SHIPPED | OUTPATIENT
Start: 2022-08-08 | End: 2022-10-27 | Stop reason: SDUPTHER

## 2022-08-11 RX ORDER — CYCLOBENZAPRINE HCL 5 MG
TABLET ORAL
Qty: 90 TABLET | Refills: 0 | Status: SHIPPED | OUTPATIENT
Start: 2022-08-11 | End: 2022-10-27 | Stop reason: SDUPTHER

## 2022-08-11 RX ORDER — METRONIDAZOLE 250 MG/1
250 TABLET ORAL EVERY MORNING
Qty: 30 TABLET | Refills: 0 | Status: SHIPPED | OUTPATIENT
Start: 2022-08-11 | End: 2022-10-14 | Stop reason: SDUPTHER

## 2022-08-16 RX ORDER — URSODIOL 300 MG/1
CAPSULE ORAL
Qty: 180 CAPSULE | Refills: 0 | OUTPATIENT
Start: 2022-08-16

## 2022-09-08 RX ORDER — LOPERAMIDE HYDROCHLORIDE 2 MG/1
CAPSULE ORAL
Qty: 240 CAPSULE | Refills: 3 | Status: SHIPPED | OUTPATIENT
Start: 2022-09-08 | End: 2022-10-27 | Stop reason: SDUPTHER

## 2022-09-08 RX ORDER — FERROUS SULFATE 325(65) MG
TABLET ORAL
Qty: 180 TABLET | Refills: 0 | Status: SHIPPED | OUTPATIENT
Start: 2022-09-08 | End: 2022-10-27 | Stop reason: SDUPTHER

## 2022-09-08 RX ORDER — HYDROXYZINE HYDROCHLORIDE 25 MG/1
TABLET, FILM COATED ORAL
Qty: 180 TABLET | Refills: 0 | Status: SHIPPED | OUTPATIENT
Start: 2022-09-08 | End: 2022-10-27 | Stop reason: SDUPTHER

## 2022-09-10 RX ORDER — URSODIOL 300 MG/1
CAPSULE ORAL
Qty: 360 CAPSULE | Refills: 1 | Status: SHIPPED | OUTPATIENT
Start: 2022-09-10 | End: 2022-09-13 | Stop reason: SDUPTHER

## 2022-09-13 ENCOUNTER — TELEPHONE (OUTPATIENT)
Dept: INTERNAL MEDICINE | Facility: CLINIC | Age: 27
End: 2022-09-13

## 2022-09-13 ENCOUNTER — OFFICE VISIT (OUTPATIENT)
Dept: INTERNAL MEDICINE | Facility: CLINIC | Age: 27
End: 2022-09-13

## 2022-09-13 VITALS
WEIGHT: 123.8 LBS | HEART RATE: 53 BPM | HEIGHT: 61 IN | TEMPERATURE: 98 F | OXYGEN SATURATION: 100 % | BODY MASS INDEX: 23.37 KG/M2 | DIASTOLIC BLOOD PRESSURE: 60 MMHG | SYSTOLIC BLOOD PRESSURE: 110 MMHG

## 2022-09-13 DIAGNOSIS — R39.15 URINARY URGENCY: ICD-10-CM

## 2022-09-13 DIAGNOSIS — R30.0 DYSURIA: Primary | ICD-10-CM

## 2022-09-13 DIAGNOSIS — R35.0 URINARY FREQUENCY: ICD-10-CM

## 2022-09-13 DIAGNOSIS — R32 URINARY INCONTINENCE, UNSPECIFIED TYPE: ICD-10-CM

## 2022-09-13 LAB
BACTERIA UR QL AUTO: ABNORMAL /HPF
BILIRUB UR QL STRIP: NEGATIVE
CLARITY UR: ABNORMAL
COLOR UR: ABNORMAL
GLUCOSE UR STRIP-MCNC: NEGATIVE MG/DL
HGB UR QL STRIP.AUTO: NEGATIVE
HYALINE CASTS UR QL AUTO: ABNORMAL /LPF
KETONES UR QL STRIP: NEGATIVE
LEUKOCYTE ESTERASE UR QL STRIP.AUTO: ABNORMAL
NITRITE UR QL STRIP: NEGATIVE
PH UR STRIP.AUTO: 7 [PH] (ref 5–8)
PROT UR QL STRIP: ABNORMAL
RBC # UR STRIP: ABNORMAL /HPF
REF LAB TEST METHOD: ABNORMAL
SP GR UR STRIP: 1.02 (ref 1–1.03)
SQUAMOUS #/AREA URNS HPF: ABNORMAL /HPF
UROBILINOGEN UR QL STRIP: ABNORMAL
WBC # UR STRIP: ABNORMAL /HPF

## 2022-09-13 PROCEDURE — 99213 OFFICE O/P EST LOW 20 MIN: CPT | Performed by: NURSE PRACTITIONER

## 2022-09-13 PROCEDURE — 81001 URINALYSIS AUTO W/SCOPE: CPT | Performed by: NURSE PRACTITIONER

## 2022-09-13 PROCEDURE — 87086 URINE CULTURE/COLONY COUNT: CPT | Performed by: NURSE PRACTITIONER

## 2022-09-13 RX ORDER — URSODIOL 300 MG/1
600 CAPSULE ORAL 2 TIMES DAILY
Qty: 360 CAPSULE | Refills: 1 | Status: SHIPPED | OUTPATIENT
Start: 2022-09-13 | End: 2022-10-27 | Stop reason: SDUPTHER

## 2022-09-13 RX ORDER — PHENAZOPYRIDINE HYDROCHLORIDE 100 MG/1
100 TABLET, FILM COATED ORAL 3 TIMES DAILY PRN
Qty: 9 TABLET | Refills: 0 | Status: SHIPPED | OUTPATIENT
Start: 2022-09-13 | End: 2022-10-27

## 2022-09-13 RX ORDER — CHOLECALCIFEROL (VITAMIN D3) 10(400)/ML
1 DROPS ORAL
Qty: 50 EACH | Refills: 1 | Status: SHIPPED | OUTPATIENT
Start: 2022-09-13

## 2022-09-13 RX ORDER — NITROFURANTOIN 25; 75 MG/1; MG/1
100 CAPSULE ORAL 2 TIMES DAILY
Qty: 10 CAPSULE | Refills: 0 | Status: SHIPPED | OUTPATIENT
Start: 2022-09-13 | End: 2022-10-27

## 2022-09-13 RX ORDER — ADALIMUMAB 80MG/0.8ML
80 KIT SUBCUTANEOUS
COMMUNITY
Start: 2022-07-13 | End: 2022-09-13

## 2022-09-13 NOTE — TELEPHONE ENCOUNTER
Called and spoke with pharmacist regarding name of cream prescribed by Demi Fernandez previously, name of medication was 1 series compound cream, patient medication was prescribed in April 2021.

## 2022-09-13 NOTE — PROGRESS NOTES
"Chief Complaint  urine issues (Complete incontinence used to be able to feel when had to go to bathroom now it is more like \"oh no I just peed\" ) and Urinary Frequency (Felt like had to go a lot then there was nothing to go )    Subjective        Claudia Wilkins presents to [facility] for Concerns for possible UTI.  Patient is here today with mother, they are reporting that over the last several days patient has had urinary incontinence, having no time at all to make it to the bathroom, and going more frequently.  She is also been to the restroom with the thought that she needed to go, and was unable to produce any urine.  Mother reports that she has had urinary issues for several months, but nothing to this extent.  They deny any other significant symptoms today.       Objective   Vital Signs:   /60 (BP Location: Left arm, Patient Position: Sitting, Cuff Size: Adult)   Pulse 53   Temp 98 °F (36.7 °C) (Temporal)   Ht 154.9 cm (60.98\")   Wt 56.2 kg (123 lb 12.8 oz)   SpO2 100%   BMI 23.41 kg/m²     Physical Exam  Vitals and nursing note reviewed.   Constitutional:       Appearance: Normal appearance.   HENT:      Head: Normocephalic and atraumatic.   Pulmonary:      Effort: Pulmonary effort is normal.   Neurological:      Mental Status: She is alert.   Psychiatric:         Mood and Affect: Mood normal.         Thought Content: Thought content normal.        Result Review :  {The following data was reviewed by ERIK Hammonds on 09/13/22                Diagnoses and all orders for this visit:    1. Dysuria (Primary)    2. Urinary incontinence, unspecified type  -     Urinalysis With Culture If Indicated - Urine, Clean Catch  -     Urinalysis, Microscopic Only - Urine, Clean Catch  -     Urine Culture - Urine, Urine, Clean Catch    3. Urinary frequency    4. Urinary urgency    Other orders  -     ursodiol (ACTIGALL) 300 MG capsule; Take 2 capsules by mouth 2 (Two) Times a Day.  " Dispense: 360 capsule; Refill: 1  -     nitrofurantoin, macrocrystal-monohydrate, (Macrobid) 100 MG capsule; Take 1 capsule by mouth 2 (Two) Times a Day.  Dispense: 10 capsule; Refill: 0  -     phenazopyridine (Pyridium) 100 MG tablet; Take 1 tablet by mouth 3 (Three) Times a Day As Needed for Bladder Spasms.  Dispense: 9 tablet; Refill: 0  -     Incontinence Supply Disposable (Comfort Protect Adult Diaper/M) misc; 1 each 5 (Five) Times a Day As Needed (for incontinence).  Dispense: 50 each; Refill: 1    Urinalysis was suspicious for UTI, we will go ahead and culture that, we will go ahead and start antibiotics now, Pyridium for symptoms for first 48 hours.  We will contact with results of culture when available.  Make sure to take all doses as prescribed.  I have sent in supplies for her incontinence as well.  Advised to have close follow-up if symptoms worsen or persist after medication.      Follow Up   No follow-ups on file.  Patient was given instructions and counseling regarding her condition or for health maintenance advice. Please see specific information pulled into the AVS if appropriate.     Adrian Jessica, APRN  9/13/2022  This note was electronically signed.

## 2022-09-14 LAB — BACTERIA SPEC AEROBE CULT: NORMAL

## 2022-10-07 DIAGNOSIS — E03.8 HYPOTHYROIDISM DUE TO HASHIMOTO'S THYROIDITIS: ICD-10-CM

## 2022-10-07 DIAGNOSIS — E06.3 HYPOTHYROIDISM DUE TO HASHIMOTO'S THYROIDITIS: ICD-10-CM

## 2022-10-11 RX ORDER — LEVOTHYROXINE SODIUM 88 UG/1
88 TABLET ORAL DAILY
Qty: 90 TABLET | Refills: 1 | Status: SHIPPED | OUTPATIENT
Start: 2022-10-11 | End: 2022-10-28

## 2022-10-14 ENCOUNTER — TELEPHONE (OUTPATIENT)
Dept: INTERNAL MEDICINE | Facility: CLINIC | Age: 27
End: 2022-10-14

## 2022-10-14 NOTE — TELEPHONE ENCOUNTER
Caller: ISABELA LU    Relationship: Mother    Best call back number: [phone]    Requested Prescriptions:   Requested Prescriptions     Pending Prescriptions Disp Refills   • metroNIDAZOLE (FLAGYL) 250 MG tablet 30 tablet 0     Sig: Take 1 tablet by mouth Every Morning.        Pharmacy where request should be sent: [facility], [facility], & [facility], [place] - [address] - [phone] [facility] [phone] FX     Additional details provided by patient: PATIENT'S MOTHER STATES PATIENT TOOK LAST DOSE ON 10.11.22    Does the patient have less than a 3 day supply:  [x] Yes  [] No    Dickson Rowe Rep   10/14/22 14:09 EDT

## 2022-10-17 NOTE — TELEPHONE ENCOUNTER
Hub staff attempted to follow warm transfer process and was unsuccessful     Caller: ISABELA LU    Relationship to patient: Mother    Best call back number: [phone]    Patient is needing: PATIENT'S MOTHER STATED THAT PHARMACY HAS NOT RECEIVED THE REFILL YET AND SHE IS NEEDING THIS. MOTHER IS REQUESTING CALL TO LET HER KNOW WHAT THE STATUS IS.

## 2022-10-18 RX ORDER — METRONIDAZOLE 250 MG/1
250 TABLET ORAL EVERY MORNING
Qty: 30 TABLET | Refills: 0 | Status: SHIPPED | OUTPATIENT
Start: 2022-10-18 | End: 2022-10-27 | Stop reason: SDUPTHER

## 2022-10-27 ENCOUNTER — OFFICE VISIT (OUTPATIENT)
Dept: INTERNAL MEDICINE | Facility: CLINIC | Age: 27
End: 2022-10-27

## 2022-10-27 VITALS
DIASTOLIC BLOOD PRESSURE: 83 MMHG | TEMPERATURE: 98.4 F | WEIGHT: 123 LBS | BODY MASS INDEX: 23.22 KG/M2 | SYSTOLIC BLOOD PRESSURE: 117 MMHG | HEIGHT: 61 IN | HEART RATE: 103 BPM | OXYGEN SATURATION: 98 %

## 2022-10-27 DIAGNOSIS — E03.8 HYPOTHYROIDISM DUE TO HASHIMOTO'S THYROIDITIS: Primary | ICD-10-CM

## 2022-10-27 DIAGNOSIS — E06.3 HYPOTHYROIDISM DUE TO HASHIMOTO'S THYROIDITIS: Primary | ICD-10-CM

## 2022-10-27 DIAGNOSIS — K51.919 ULCERATIVE COLITIS WITH COMPLICATION, UNSPECIFIED LOCATION: ICD-10-CM

## 2022-10-27 DIAGNOSIS — R35.0 URINARY FREQUENCY: ICD-10-CM

## 2022-10-27 DIAGNOSIS — K76.9 CHRONIC LIVER DISEASE: ICD-10-CM

## 2022-10-27 DIAGNOSIS — D64.9 ANEMIA, UNSPECIFIED TYPE: ICD-10-CM

## 2022-10-27 DIAGNOSIS — F41.9 ANXIETY: ICD-10-CM

## 2022-10-27 DIAGNOSIS — K60.3 ANAL FISTULA: ICD-10-CM

## 2022-10-27 LAB
BACTERIA UR QL AUTO: ABNORMAL /HPF
BILIRUB UR QL STRIP: NEGATIVE
CLARITY UR: ABNORMAL
COLOR UR: ABNORMAL
GLUCOSE UR STRIP-MCNC: NEGATIVE MG/DL
HGB UR QL STRIP.AUTO: ABNORMAL
HYALINE CASTS UR QL AUTO: ABNORMAL /LPF
KETONES UR QL STRIP: NEGATIVE
LEUKOCYTE ESTERASE UR QL STRIP.AUTO: ABNORMAL
NITRITE UR QL STRIP: NEGATIVE
PH UR STRIP.AUTO: 6 [PH] (ref 5–8)
PROT UR QL STRIP: ABNORMAL
RBC # UR STRIP: ABNORMAL /HPF
REF LAB TEST METHOD: ABNORMAL
SP GR UR STRIP: >=1.03 (ref 1–1.03)
SQUAMOUS #/AREA URNS HPF: ABNORMAL /HPF
UROBILINOGEN UR QL STRIP: ABNORMAL
WBC # UR STRIP: ABNORMAL /HPF

## 2022-10-27 PROCEDURE — 81001 URINALYSIS AUTO W/SCOPE: CPT | Performed by: NURSE PRACTITIONER

## 2022-10-27 PROCEDURE — 99214 OFFICE O/P EST MOD 30 MIN: CPT | Performed by: NURSE PRACTITIONER

## 2022-10-27 PROCEDURE — 84443 ASSAY THYROID STIM HORMONE: CPT | Performed by: NURSE PRACTITIONER

## 2022-10-27 PROCEDURE — 84439 ASSAY OF FREE THYROXINE: CPT | Performed by: NURSE PRACTITIONER

## 2022-10-27 PROCEDURE — 82607 VITAMIN B-12: CPT | Performed by: NURSE PRACTITIONER

## 2022-10-27 PROCEDURE — 87086 URINE CULTURE/COLONY COUNT: CPT | Performed by: NURSE PRACTITIONER

## 2022-10-27 PROCEDURE — 85025 COMPLETE CBC W/AUTO DIFF WBC: CPT | Performed by: NURSE PRACTITIONER

## 2022-10-27 PROCEDURE — 84466 ASSAY OF TRANSFERRIN: CPT | Performed by: NURSE PRACTITIONER

## 2022-10-27 PROCEDURE — 80053 COMPREHEN METABOLIC PANEL: CPT | Performed by: NURSE PRACTITIONER

## 2022-10-27 PROCEDURE — 83540 ASSAY OF IRON: CPT | Performed by: NURSE PRACTITIONER

## 2022-10-27 PROCEDURE — 82746 ASSAY OF FOLIC ACID SERUM: CPT | Performed by: NURSE PRACTITIONER

## 2022-10-27 PROCEDURE — 80061 LIPID PANEL: CPT | Performed by: NURSE PRACTITIONER

## 2022-10-27 RX ORDER — URSODIOL 300 MG/1
600 CAPSULE ORAL 2 TIMES DAILY
Qty: 360 CAPSULE | Refills: 1 | Status: SHIPPED | OUTPATIENT
Start: 2022-10-27 | End: 2023-03-08 | Stop reason: SDUPTHER

## 2022-10-27 RX ORDER — METRONIDAZOLE 250 MG/1
250 TABLET ORAL EVERY MORNING
Qty: 90 TABLET | Refills: 1 | Status: SHIPPED | OUTPATIENT
Start: 2022-10-27 | End: 2022-11-11

## 2022-10-27 RX ORDER — FERROUS SULFATE 325(65) MG
1 TABLET ORAL 2 TIMES DAILY
Qty: 180 TABLET | Refills: 0 | Status: SHIPPED | OUTPATIENT
Start: 2022-10-27 | End: 2022-12-08

## 2022-10-27 RX ORDER — HYDROXYZINE HYDROCHLORIDE 25 MG/1
25 TABLET, FILM COATED ORAL 2 TIMES DAILY
Qty: 180 TABLET | Refills: 1 | Status: SHIPPED | OUTPATIENT
Start: 2022-10-27 | End: 2023-03-08 | Stop reason: SDUPTHER

## 2022-10-27 RX ORDER — GARLIC EXTRACT 500 MG
1 CAPSULE ORAL 2 TIMES DAILY
Qty: 180 CAPSULE | Refills: 3 | Status: SHIPPED | OUTPATIENT
Start: 2022-10-27

## 2022-10-27 RX ORDER — CYCLOBENZAPRINE HCL 5 MG
5 TABLET ORAL 2 TIMES DAILY PRN
Qty: 180 TABLET | Refills: 0 | Status: SHIPPED | OUTPATIENT
Start: 2022-10-27 | End: 2023-03-08 | Stop reason: SDUPTHER

## 2022-10-27 RX ORDER — LOPERAMIDE HYDROCHLORIDE 2 MG/1
2 CAPSULE ORAL 2 TIMES DAILY
Qty: 180 CAPSULE | Refills: 3 | Status: SHIPPED | OUTPATIENT
Start: 2022-10-27 | End: 2023-03-08 | Stop reason: SDUPTHER

## 2022-10-28 DIAGNOSIS — E03.8 HYPOTHYROIDISM DUE TO HASHIMOTO'S THYROIDITIS: Primary | ICD-10-CM

## 2022-10-28 DIAGNOSIS — E06.3 HYPOTHYROIDISM DUE TO HASHIMOTO'S THYROIDITIS: Primary | ICD-10-CM

## 2022-10-28 PROBLEM — R35.0 URINARY FREQUENCY: Status: ACTIVE | Noted: 2022-10-28

## 2022-10-28 LAB
ALBUMIN SERPL-MCNC: 4.8 G/DL (ref 3.5–5.2)
ALBUMIN/GLOB SERPL: 1.7 G/DL
ALP SERPL-CCNC: 85 U/L (ref 39–117)
ALT SERPL W P-5'-P-CCNC: 23 U/L (ref 1–33)
ANION GAP SERPL CALCULATED.3IONS-SCNC: 8.7 MMOL/L (ref 5–15)
AST SERPL-CCNC: 26 U/L (ref 1–32)
BACTERIA SPEC AEROBE CULT: NO GROWTH
BASOPHILS # BLD AUTO: 0.1 10*3/MM3 (ref 0–0.2)
BASOPHILS NFR BLD AUTO: 1 % (ref 0–1.5)
BILIRUB SERPL-MCNC: 0.4 MG/DL (ref 0–1.2)
BUN SERPL-MCNC: 8 MG/DL (ref 6–20)
BUN/CREAT SERPL: 10.5 (ref 7–25)
CALCIUM SPEC-SCNC: 9.6 MG/DL (ref 8.6–10.5)
CHLORIDE SERPL-SCNC: 103 MMOL/L (ref 98–107)
CHOLEST SERPL-MCNC: 156 MG/DL (ref 0–200)
CO2 SERPL-SCNC: 25.3 MMOL/L (ref 22–29)
CREAT SERPL-MCNC: 0.76 MG/DL (ref 0.57–1)
DEPRECATED RDW RBC AUTO: 38.5 FL (ref 37–54)
EGFRCR SERPLBLD CKD-EPI 2021: 111 ML/MIN/1.73
EOSINOPHIL # BLD AUTO: 0.34 10*3/MM3 (ref 0–0.4)
EOSINOPHIL NFR BLD AUTO: 3.4 % (ref 0.3–6.2)
ERYTHROCYTE [DISTWIDTH] IN BLOOD BY AUTOMATED COUNT: 11.2 % (ref 12.3–15.4)
FOLATE SERPL-MCNC: 14.5 NG/ML (ref 4.78–24.2)
GLOBULIN UR ELPH-MCNC: 2.9 GM/DL
GLUCOSE SERPL-MCNC: 58 MG/DL (ref 65–99)
HCT VFR BLD AUTO: 43.5 % (ref 34–46.6)
HDLC SERPL-MCNC: 82 MG/DL (ref 40–60)
HGB BLD-MCNC: 14.7 G/DL (ref 12–15.9)
IMM GRANULOCYTES # BLD AUTO: 0.03 10*3/MM3 (ref 0–0.05)
IMM GRANULOCYTES NFR BLD AUTO: 0.3 % (ref 0–0.5)
IRON 24H UR-MRATE: 105 MCG/DL (ref 37–145)
IRON SATN MFR SERPL: 35 % (ref 20–50)
LDLC SERPL CALC-MCNC: 63 MG/DL (ref 0–100)
LDLC/HDLC SERPL: 0.78 {RATIO}
LYMPHOCYTES # BLD AUTO: 2.29 10*3/MM3 (ref 0.7–3.1)
LYMPHOCYTES NFR BLD AUTO: 23 % (ref 19.6–45.3)
MCH RBC QN AUTO: 32.1 PG (ref 26.6–33)
MCHC RBC AUTO-ENTMCNC: 33.8 G/DL (ref 31.5–35.7)
MCV RBC AUTO: 95 FL (ref 79–97)
MONOCYTES # BLD AUTO: 0.62 10*3/MM3 (ref 0.1–0.9)
MONOCYTES NFR BLD AUTO: 6.2 % (ref 5–12)
NEUTROPHILS NFR BLD AUTO: 6.58 10*3/MM3 (ref 1.7–7)
NEUTROPHILS NFR BLD AUTO: 66.1 % (ref 42.7–76)
NRBC BLD AUTO-RTO: 0 /100 WBC (ref 0–0.2)
PLATELET # BLD AUTO: 349 10*3/MM3 (ref 140–450)
PMV BLD AUTO: 11.5 FL (ref 6–12)
POTASSIUM SERPL-SCNC: 3.8 MMOL/L (ref 3.5–5.2)
PROT SERPL-MCNC: 7.7 G/DL (ref 6–8.5)
RBC # BLD AUTO: 4.58 10*6/MM3 (ref 3.77–5.28)
SODIUM SERPL-SCNC: 137 MMOL/L (ref 136–145)
T4 FREE SERPL-MCNC: 1.08 NG/DL (ref 0.93–1.7)
TIBC SERPL-MCNC: 302 MCG/DL (ref 298–536)
TRANSFERRIN SERPL-MCNC: 203 MG/DL (ref 200–360)
TRIGL SERPL-MCNC: 50 MG/DL (ref 0–150)
TSH SERPL DL<=0.05 MIU/L-ACNC: 7 UIU/ML (ref 0.27–4.2)
VIT B12 BLD-MCNC: 494 PG/ML (ref 211–946)
VLDLC SERPL-MCNC: 11 MG/DL (ref 5–40)
WBC NRBC COR # BLD: 9.96 10*3/MM3 (ref 3.4–10.8)

## 2022-10-28 RX ORDER — LEVOTHYROXINE SODIUM 0.1 MG/1
100 TABLET ORAL DAILY
Qty: 90 TABLET | Refills: 0 | Status: SHIPPED | OUTPATIENT
Start: 2022-10-28 | End: 2023-03-08 | Stop reason: SDUPTHER

## 2022-10-28 NOTE — ASSESSMENT & PLAN NOTE
Mother is reporting that they have had difficulty discussing this issue with colorectal surgery, they have made multiple phone calls and unable to get a return phone call.  They do have difficulty with transportation, and are trying to figure out if the procedure that needs to be done can even be completed by the surgeon that they see you.  Patient continues with a drain in the perianal area due to the fistula, which does cause her problems with skin breakdown in that area, and is concerning as it may be the cause of her recurrent urinary symptoms.  Encourage them to continue to follow-up with colorectal surgeon, if unable to get anywhere soon, we could discuss contacting the office for them.

## 2022-10-28 NOTE — ASSESSMENT & PLAN NOTE
Patient recently treated for urinary tract infection, however culture was reported as contaminated.  They did complete course of antibiotics.  Symptoms resolved temporarily, and have since returned.  Will obtain new urinalysis, trying to ensure a good clean-catch.  Will culture, we will follow-up with patient based on culture results and treat appropriately based on those results.

## 2022-10-28 NOTE — ASSESSMENT & PLAN NOTE
Patient with previous anemia, was thought to be iron deficient, is on chronic iron replacement, has not had any lab work performed in about 6 months, repeat labs today, follow-up based on results.

## 2022-10-28 NOTE — ASSESSMENT & PLAN NOTE
Patient currently using hydroxyzine, needs medications sent in, mail order pharmacy is requesting 90-day refills.

## 2022-10-28 NOTE — PROGRESS NOTES
"Chief Complaint  Urinary Incontinence, warm and nausea , gi issues, and Urinary Tract Infection (/ )    Subjective        Claudia Wilkins presents to [facility] for History of Present Illness  Follow-up for chronic conditions, concerns regarding urinary frequency, incontinence, concerns for UTI.    Patient is needing follow-up for her chronic conditions, and is also having acute complaints today.  Chronic conditions seem to be stable overall, however they were having difficulty following up with colorectal surgery, they have not had the ability to receive a phone call back, been trying to her schedule appointment and get information for the last month approximately.  Other chronic conditions appear to be stable, they are needing refills on the medication, however most recent lab work was performed about 6 months ago.  Is needing repeat lab work, especially thyroid, they did make dosage adjustment, but has not had repeat labs since then.    Patient is concerned regarding another UTI, she was treated about 3 weeks ago by me, similar complaints, urine culture came back inconclusive due to contamination, she was treated with antibiotics, completed that course, symptoms resolved temporarily, but have returned since.  Having issues with frequency, and incontinence.       Objective   Vital Signs:   /83   Pulse 103   Temp 98.4 °F (36.9 °C)   Ht 154.9 cm (60.98\")   Wt 55.8 kg (123 lb)   SpO2 98%   BMI 23.26 kg/m²     Physical Exam  Vitals and nursing note reviewed.   Constitutional:       Appearance: Normal appearance. She is normal weight.   HENT:      Head: Normocephalic and atraumatic.   Eyes:      Pupils: Pupils are equal, round, and reactive to light.   Pulmonary:      Effort: Pulmonary effort is normal.   Neurological:      Mental Status: She is alert.   Psychiatric:         Mood and Affect: Mood normal.         Thought Content: Thought content normal.        Result Review :  {The " following data was reviewed by ERIK Hammonds on 10/28/22                Diagnoses and all orders for this visit:    1. Hypothyroidism due to Hashimoto's thyroiditis (Primary)  Assessment & Plan:  Patient has not had repeat lab work since her last dosage adjustment, they did have to go up on her medication dosage 6 months ago, will repeat lab work today, will prescribe medications accordingly.    Orders:  -     Lipid Panel  -     TSH  -     T4, Free    2. Chronic liver disease  Assessment & Plan:  Patient is due for lab work, we will obtain labs today.    Orders:  -     Comprehensive Metabolic Panel  -     Lipid Panel    3. Ulcerative colitis with complication, unspecified location (HCC)  Assessment & Plan:  Does not appear to have any current symptoms, does see colorectal surgery,    Orders:  -     Lipid Panel    4. Anemia, unspecified type  Assessment & Plan:  Patient with previous anemia, was thought to be iron deficient, is on chronic iron replacement, has not had any lab work performed in about 6 months, repeat labs today, follow-up based on results.    Orders:  -     CBC & Differential  -     Lipid Panel  -     Iron Profile  -     Vitamin B12 & Folate    5. Urinary frequency  Assessment & Plan:  Patient recently treated for urinary tract infection, however culture was reported as contaminated.  They did complete course of antibiotics.  Symptoms resolved temporarily, and have since returned.  Will obtain new urinalysis, trying to ensure a good clean-catch.  Will culture, we will follow-up with patient based on culture results and treat appropriately based on those results.    Orders:  -     Urinalysis With Culture If Indicated - Urine, Clean Catch  -     Urinalysis, Microscopic Only - Urine, Clean Catch  -     Urine Culture - Urine, Urine, Clean Catch    6. Anal fistula  Assessment & Plan:  Mother is reporting that they have had difficulty discussing this issue with colorectal surgery, they have made  multiple phone calls and unable to get a return phone call.  They do have difficulty with transportation, and are trying to figure out if the procedure that needs to be done can even be completed by the surgeon that they see you.  Patient continues with a drain in the perianal area due to the fistula, which does cause her problems with skin breakdown in that area, and is concerning as it may be the cause of her recurrent urinary symptoms.  Encourage them to continue to follow-up with colorectal surgeon, if unable to get anywhere soon, we could discuss contacting the office for them.      7. Anxiety  Assessment & Plan:  Patient currently using hydroxyzine, needs medications sent in, mail order pharmacy is requesting 90-day refills.      Other orders  -     sertraline (ZOLOFT) 50 MG tablet; Take 1 tablet by mouth Daily.  Dispense: 90 tablet; Refill: 1  -     cyclobenzaprine (FLEXERIL) 5 MG tablet; Take 1 tablet by mouth 2 (Two) Times a Day As Needed for Muscle Spasms.  Dispense: 180 tablet; Refill: 0  -     ursodiol (ACTIGALL) 300 MG capsule; Take 2 capsules by mouth 2 (Two) Times a Day.  Dispense: 360 capsule; Refill: 1  -     ferrous sulfate (FeroSul) 325 (65 FE) MG tablet; Take 1 tablet by mouth 2 (Two) Times a Day.  Dispense: 180 tablet; Refill: 0  -     hydrOXYzine (ATARAX) 25 MG tablet; Take 1 tablet by mouth 2 (Two) Times a Day.  Dispense: 180 tablet; Refill: 1  -     Lactobacillus Acid-Pectin (Acidophilus/Pectin) capsule; Take 1 capsule by mouth 2 (Two) Times a Day.  Dispense: 180 capsule; Refill: 3  -     metroNIDAZOLE (FLAGYL) 250 MG tablet; Take 1 tablet by mouth Every Morning.  Dispense: 90 tablet; Refill: 1  -     loperamide (IMODIUM) 2 MG capsule; Take 1 capsule by mouth 2 (Two) Times a Day.  Dispense: 180 capsule; Refill: 3        Follow Up   Return in about 1 week (around 11/3/2022) for Video visit, Recheck.  Patient was given instructions and counseling regarding her condition or for health  maintenance advice. Please see specific information pulled into the AVS if appropriate.     Adrian Jessica, ERIK  10/28/2022  This note was electronically signed.

## 2022-10-28 NOTE — ASSESSMENT & PLAN NOTE
Patient has not had repeat lab work since her last dosage adjustment, they did have to go up on her medication dosage 6 months ago, will repeat lab work today, will prescribe medications accordingly.

## 2022-11-11 RX ORDER — METRONIDAZOLE 250 MG/1
250 TABLET ORAL EVERY MORNING
Qty: 30 TABLET | Refills: 0 | Status: SHIPPED | OUTPATIENT
Start: 2022-11-11 | End: 2023-03-08 | Stop reason: SDUPTHER

## 2022-12-08 RX ORDER — FERROUS SULFATE 325(65) MG
TABLET ORAL
Qty: 180 TABLET | Refills: 0 | Status: SHIPPED | OUTPATIENT
Start: 2022-12-08

## 2023-03-08 NOTE — TELEPHONE ENCOUNTER
Caller: ISABELA LU  NOT ON  VERBAL    Relationship: Mother    Best call back number: [phone]    Requested Prescriptions:   Requested Prescriptions     Pending Prescriptions Disp Refills   • levothyroxine (Synthroid) 100 MCG tablet 90 tablet 0     Sig: Take 1 tablet by mouth Daily.   • cyclobenzaprine (FLEXERIL) 5 MG tablet 180 tablet 0     Sig: Take 1 tablet by mouth 2 (Two) Times a Day As Needed for Muscle Spasms.   • sertraline (ZOLOFT) 50 MG tablet 90 tablet 1     Sig: Take 1 tablet by mouth Daily.   • metroNIDAZOLE (FLAGYL) 250 MG tablet 30 tablet 0     Sig: Take 1 tablet by mouth Every Morning.   • hydrOXYzine (ATARAX) 25 MG tablet 180 tablet 1     Sig: Take 1 tablet by mouth 2 (Two) Times a Day.   • loperamide (IMODIUM) 2 MG capsule 180 capsule 3     Sig: Take 1 capsule by mouth 2 (Two) Times a Day.   • ursodiol (ACTIGALL) 300 MG capsule 360 capsule 1     Sig: Take 2 capsules by mouth 2 (Two) Times a Day.        Pharmacy where request should be sent: [facility] [address] - [phone]  - [phone] FX     Additional details provided by patient: PATIENTS MOTHER IS REQUESTING THAT ALL SCRIPTS ABOVE ARE TRANSFERRED OVER TO THE MAIL DELIVERY PHARMACY. PATIENT NEEDS REFILLS ON ALL THE MEDICATIONS. PATIENT IS FULLY OUT OF LEVOTHYROXINE AND PATIENTS MOTHER IS REQUESTING A SMALL SUPPLY TO BE SENT TO THE CURRENT PHARMACY UNTIL THE ACTUAL REFILL IS RECEIVED IN THE MAIL. PATIENTS MOTHER ALSO EXPLAINED THAT THE DIRECTIONS FOR LOPERAMIDE ARE INCORRECT. THE DIRECTIONS ARE SUPPOSED TO STATE TO TAKE TWO TABLETS TWICE A DAY, UP TO 4 TIMES A DAY.    Does the patient have less than a 3 day supply:  [x] Yes  [] No    Would you like a call back once the refill request has been completed: [x] Yes [] No    If the office needs to give you a call back, can they leave a voicemail: [x] Yes [] No    Dickson Saenz Rep   03/08/23 16:56 EST

## 2023-03-09 RX ORDER — LEVOTHYROXINE SODIUM 0.1 MG/1
100 TABLET ORAL DAILY
Qty: 90 TABLET | Refills: 0 | Status: SHIPPED | OUTPATIENT
Start: 2023-03-09 | End: 2023-03-10 | Stop reason: SDUPTHER

## 2023-03-09 RX ORDER — URSODIOL 300 MG/1
600 CAPSULE ORAL 2 TIMES DAILY
Qty: 360 CAPSULE | Refills: 1 | Status: SHIPPED | OUTPATIENT
Start: 2023-03-09

## 2023-03-09 RX ORDER — LOPERAMIDE HYDROCHLORIDE 2 MG/1
2 CAPSULE ORAL 2 TIMES DAILY
Qty: 180 CAPSULE | Refills: 3 | Status: SHIPPED | OUTPATIENT
Start: 2023-03-09

## 2023-03-09 RX ORDER — METRONIDAZOLE 250 MG/1
250 TABLET ORAL EVERY MORNING
Qty: 30 TABLET | Refills: 0 | Status: SHIPPED | OUTPATIENT
Start: 2023-03-09

## 2023-03-09 RX ORDER — CYCLOBENZAPRINE HCL 5 MG
5 TABLET ORAL 2 TIMES DAILY PRN
Qty: 180 TABLET | Refills: 0 | Status: SHIPPED | OUTPATIENT
Start: 2023-03-09

## 2023-03-09 RX ORDER — HYDROXYZINE HYDROCHLORIDE 25 MG/1
25 TABLET, FILM COATED ORAL 2 TIMES DAILY
Qty: 180 TABLET | Refills: 1 | Status: SHIPPED | OUTPATIENT
Start: 2023-03-09

## 2023-03-10 NOTE — TELEPHONE ENCOUNTER
Will you send in enough Synthroid 100 mcg until Patient gets her medication from  mail order. 1 or 2 wk's?

## 2023-03-13 RX ORDER — LEVOTHYROXINE SODIUM 0.1 MG/1
100 TABLET ORAL DAILY
Qty: 90 TABLET | Refills: 0 | Status: SHIPPED | OUTPATIENT
Start: 2023-03-13

## 2023-05-02 ENCOUNTER — TELEPHONE (OUTPATIENT)
Dept: INTERNAL MEDICINE | Facility: CLINIC | Age: 28
End: 2023-05-02

## 2023-05-02 DIAGNOSIS — K50.914 CROHN'S DISEASE WITH ABSCESS, UNSPECIFIED GASTROINTESTINAL TRACT LOCATION: Primary | ICD-10-CM

## 2023-05-02 NOTE — TELEPHONE ENCOUNTER
Caller: ISABELA LU   NOT ON VERBAL    Relationship: Mother    Best call back number: [phone]    What specialty or service is being requested: CORRECTOL SURGERY SECOND OPINION    What is the provider, practice or medical service name: [facility]

## 2023-05-02 NOTE — TELEPHONE ENCOUNTER
Caller: ISABELA LU  NOT ON VERBAL    Relationship to patient: Mother    Best call back number:    [phone]       Patient is needing: WOULD LIKE AN UPDATE ON A MEDICATION REFILL OF metroNIDAZOLE (FLAGYL) 250 MG tablet

## 2023-05-04 RX ORDER — METRONIDAZOLE 250 MG/1
250 TABLET ORAL EVERY MORNING
Qty: 30 TABLET | Refills: 0 | Status: SHIPPED | OUTPATIENT
Start: 2023-05-04

## 2023-05-11 RX ORDER — METRONIDAZOLE 250 MG/1
TABLET ORAL
Qty: 30 TABLET | Refills: 0 | Status: SHIPPED | OUTPATIENT
Start: 2023-05-11

## 2023-05-18 ENCOUNTER — OFFICE VISIT (OUTPATIENT)
Dept: SURGERY | Facility: CLINIC | Age: 28
End: 2023-05-18
Payer: MEDICARE

## 2023-05-18 ENCOUNTER — PREP FOR SURGERY (OUTPATIENT)
Dept: OTHER | Facility: HOSPITAL | Age: 28
End: 2023-05-18
Payer: MEDICARE

## 2023-05-18 VITALS — WEIGHT: 120 LBS | HEIGHT: 64 IN | BODY MASS INDEX: 20.49 KG/M2

## 2023-05-18 DIAGNOSIS — K60.3 ANAL FISTULA: ICD-10-CM

## 2023-05-18 DIAGNOSIS — K51.919 ULCERATIVE COLITIS WITH COMPLICATION, UNSPECIFIED LOCATION: Primary | ICD-10-CM

## 2023-05-18 DIAGNOSIS — K91.850 INFLAMMATION OF ILEOANAL POUCH: Primary | ICD-10-CM

## 2023-05-18 DIAGNOSIS — K91.89 INFLAMMATION OF ILEOANAL POUCH: Primary | ICD-10-CM

## 2023-05-18 RX ORDER — SODIUM PHOSPHATE,MONO-DIBASIC 19G-7G/118
1 ENEMA (ML) RECTAL ONCE
OUTPATIENT
Start: 2023-05-18 | End: 2023-05-18

## 2023-05-18 RX ORDER — SODIUM CHLORIDE 0.9 % (FLUSH) 0.9 %
10 SYRINGE (ML) INJECTION AS NEEDED
OUTPATIENT
Start: 2023-05-18

## 2023-05-18 RX ORDER — SODIUM CHLORIDE 0.9 % (FLUSH) 0.9 %
3 SYRINGE (ML) INJECTION EVERY 12 HOURS SCHEDULED
OUTPATIENT
Start: 2023-05-18

## 2023-05-18 RX ORDER — SODIUM CHLORIDE 9 MG/ML
40 INJECTION, SOLUTION INTRAVENOUS AS NEEDED
OUTPATIENT
Start: 2023-05-18

## 2023-05-25 NOTE — PROGRESS NOTES
General Surgery/Colorectal Surgery Note    Patient Name:  Claudia Wilkins  YOB: 1995  [id number]    Referring Provider: Demi Fernandez MD      Patient Care Team:  Demi Fernandez MD as PCP - General (Internal Medicine)    Chief complaint establish care    Subjective .     History of present illness:    History of ulcerative colitis status post total proctocolectomy with J-pouch performed at [facility].  Since that time she has developed perirectal abscess with seton drain placement July 2021 by Dr. Gardner.  She is on chronic Flagyl for pouchitis.  She has a history of sclerosing cholangitis.  She is thought possibly to have Crohn's disease.  She has urgency to have bowel movements.  No fecal incontinence.  No mucus in her stool.  No abdominal pain.  She wishes to establish care here.      History:  Past Medical History:   Diagnosis Date   • Anemia    • Anxiety    • AR (allergic rhinitis)    • Arthritis    • Celiac disease    • CFS (chronic fatigue syndrome)    • Chronic liver disease    • Chronic pain    • Crohn's disease    • Depression    • Fibromyalgia    • Fibromyalgia, primary    • Gastric ulcer    • H/O psychiatric care    • Hemorrhoids    • Hypermobility syndrome    • Hypothyroidism    • IBS (irritable bowel syndrome)    • Insomnia    • Lactose intolerance    • Moderate episode of recurrent major depressive disorder 01/29/2018    CONTINUE CURRENT MEDS    • Nasal crusting    • Osteopenia    • Pancreatitis    • Pouchitis    • Primary sclerosing cholangitis    • PTSD (post-traumatic stress disorder)    • RA (rheumatoid arthritis)    • Recurrent epistaxis    • Scoliosis    • Ulcerative colitis        Past Surgical History:   Procedure Laterality Date   • COLECTOMY TOTAL  2006    COLECTOMY W/ ILEOANAI J POUCH & SMALL BOWEL PULL THROUGH    • COLON SURGERY     • COLONOSCOPY     • ENDOSCOPY     • INCISION AND DRAINAGE PERIRECTAL ABSCESS         Family History   Problem Relation  Age of Onset   • Prostate cancer Paternal Grandfather    • Stroke Other    • Heart disease Other    • Kidney disease Other    • Diabetes Other    • Rectal cancer Other    • Colon cancer Other    • Anxiety disorder Mother    • Cancer Maternal Grandfather    • Heart disease Maternal Grandfather    • Arthritis Maternal Grandmother    • Asthma Sister    • Diabetes Sister        Social History     Tobacco Use   • Smoking status: Never   • Smokeless tobacco: Never   Vaping Use   • Vaping Use: Never used   Substance Use Topics   • Alcohol use: Never   • Drug use: Never       Review of Systems  All systems were reviewed and negative except for:   Review of Systems   Constitutional: Negative for chills, fever and unexpected weight loss.   HENT: Negative for congestion, nosebleeds and voice change.    Eyes: Negative for blurred vision, double vision and discharge.   Respiratory: Negative for apnea, chest tightness and shortness of breath.    Cardiovascular: Negative for chest pain and leg swelling.   Gastrointestinal:        See HPI   Endocrine: Negative for cold intolerance and heat intolerance.   Genitourinary: Negative for dysuria, hematuria and urgency.   Musculoskeletal: Negative for back pain, joint swelling and neck pain.   Skin: Negative for color change and dry skin.   Neurological: Negative for dizziness and confusion.   Hematological: Negative for adenopathy.   Psychiatric/Behavioral: Negative for agitation and behavioral problems.     MEDS:  Prior to Admission medications    Medication Sig Start Date End Date Taking? Authorizing Provider   Calcium Carb-Cholecalciferol (Oyster Shell Calcium w/D) 500-5 MG-MCG tablet TAKE 1 TABLET BY MOUTH TWICE DAILY 12/8/22  Yes Demi Fernandez MD   cyclobenzaprine (FLEXERIL) 5 MG tablet Take 1 tablet by mouth 2 (Two) Times a Day As Needed for Muscle Spasms. 3/9/23  Yes Demi Fernandez MD   FeroSul 325 (65 Fe) MG tablet TAKE 1 TABLET BY MOUTH TWICE DAILY 12/8/22  Yes Jim  Demi Severino MD   hydrOXYzine (ATARAX) 25 MG tablet Take 1 tablet by mouth 2 (Two) Times a Day. 3/9/23  Yes Demi Fernandez MD   Incontinence Supply Disposable (Comfort Protect Adult Diaper/M) misc 1 each 5 (Five) Times a Day As Needed (for incontinence). 9/13/22  Yes Adrian Jessica APRN   Lactobacillus Acid-Pectin (Acidophilus/Pectin) capsule Take 1 capsule by mouth 2 (Two) Times a Day. 10/27/22  Yes Adrian Jessica APRN   levothyroxine (Synthroid) 100 MCG tablet Take 1 tablet by mouth Daily. 3/13/23  Yes Demi Fernandez MD   loperamide (IMODIUM) 2 MG capsule Take 1 capsule by mouth 2 (Two) Times a Day. 3/9/23  Yes Demi Fernandez MD   metroNIDAZOLE (FLAGYL) 250 MG tablet TAKE 1 TABLET EVERY MORNING 5/11/23  Yes Demi Fernandez MD   NON FORMULARY Pain medicated lotion with Gabapentin. Patient uses for Chronic pain/Arthritis   Yes Obdulia Alvarez MD   sertraline (ZOLOFT) 50 MG tablet Take 1 tablet by mouth Daily. 3/9/23  Yes Demi Fernadnez MD   ursodiol (ACTIGALL) 300 MG capsule Take 2 capsules by mouth 2 (Two) Times a Day. 3/9/23  Yes Demi Fernandez MD   Adalimumab (HUMIRA) 40 MG/0.4ML Pen-injector Kit Inject 40 mg under the skin into the appropriate area as directed Every 14 (Fourteen) Days.  Patient not taking: Reported on 5/18/2023 8/30/22   Obdulia Alvarez MD        Allergies:  Gluten meal and Latex    Objective     Vital Signs        Physical Exam:     General Appearance:    Alert, cooperative, in no acute distress   Head:    Normocephalic, without obvious abnormality, atraumatic   Eyes:          Conjunctivae and sclerae normal, no icterus,     Ears:    Ears appear intact with no abnormalities noted   Throat:   No oral lesions, no thrush, oral mucosa moist   Neck:   No adenopathy, supple, trachea midline, no thyromegaly   Back:     No kyphosis present, no scoliosis present, no skin lesions,      erythema or scars, no tenderness to percussion or                   palpation,   " range of motion normal   Lungs:     Clear to auscultation,respirations regular, even and                  unlabored    Heart:    Regular rhythm and normal rate, normal S1 and S2, no            murmur, no gallop, no rub, no click   Chest Wall:    No abnormalities observed   Abdomen:     Normal bowel sounds, no masses, no organomegaly, soft        non-tender, non-distended, no guarding, no rebound                tenderness   Rectal:        Extremities:   Moves all extremities well, no edema, no cyanosis, no             redness   Pulses:   Pulses palpable and equal bilaterally   Skin:   No bleeding, bruising or rash   Lymph nodes:   No palpable adenopathy   Neurologic:   A/o x 4 with no deficits       Results Review:   {Results Review:61342::\"I reviewed the patient's new clinical results.\"    LABS/IMAGING:  Results for orders placed or performed in visit on 10/27/22   Urine Culture - Urine, Urine, Clean Catch    Specimen: Urine, Clean Catch   Result Value Ref Range    Urine Culture No growth    Comprehensive Metabolic Panel    Specimen: Arm, Left; Blood   Result Value Ref Range    Glucose 58 (L) 65 - 99 mg/dL    BUN 8 6 - 20 mg/dL    Creatinine 0.76 0.57 - 1.00 mg/dL    Sodium 137 136 - 145 mmol/L    Potassium 3.8 3.5 - 5.2 mmol/L    Chloride 103 98 - 107 mmol/L    CO2 25.3 22.0 - 29.0 mmol/L    Calcium 9.6 8.6 - 10.5 mg/dL    Total Protein 7.7 6.0 - 8.5 g/dL    Albumin 4.80 3.50 - 5.20 g/dL    ALT (SGPT) 23 1 - 33 U/L    AST (SGOT) 26 1 - 32 U/L    Alkaline Phosphatase 85 39 - 117 U/L    Total Bilirubin 0.4 0.0 - 1.2 mg/dL    Globulin 2.9 gm/dL    A/G Ratio 1.7 g/dL    BUN/Creatinine Ratio 10.5 7.0 - 25.0    Anion Gap 8.7 5.0 - 15.0 mmol/L    eGFR 111.0 >60.0 mL/min/1.73   Lipid Panel    Specimen: Arm, Left; Blood   Result Value Ref Range    Total Cholesterol 156 0 - 200 mg/dL    Triglycerides 50 0 - 150 mg/dL    HDL Cholesterol 82 (H) 40 - 60 mg/dL    LDL Cholesterol  63 0 - 100 mg/dL    VLDL Cholesterol 11 5 - 40 " mg/dL    LDL/HDL Ratio 0.78    Iron Profile    Specimen: Arm, Left; Blood   Result Value Ref Range    Iron 105 37 - 145 mcg/dL    Iron Saturation 35 20 - 50 %    Transferrin 203 200 - 360 mg/dL    TIBC 302 298 - 536 mcg/dL   Vitamin B12 & Folate    Specimen: Arm, Left; Blood   Result Value Ref Range    Folate 14.50 4.78 - 24.20 ng/mL    Vitamin B-12 494 211 - 946 pg/mL   TSH    Specimen: Arm, Left; Blood   Result Value Ref Range    TSH 7.000 (H) 0.270 - 4.200 uIU/mL   T4, Free    Specimen: Arm, Left; Blood   Result Value Ref Range    Free T4 1.08 0.93 - 1.70 ng/dL   Urinalysis With Culture If Indicated - Urine, Clean Catch    Specimen: Urine, Clean Catch   Result Value Ref Range    Color, UA Dark Yellow (A) Yellow, Straw    Appearance, UA Turbid (A) Clear    pH, UA 6.0 5.0 - 8.0    Specific Gravity, UA >=1.030 1.005 - 1.030    Glucose, UA Negative Negative    Ketones, UA Negative Negative    Bilirubin, UA Negative Negative    Blood, UA Trace (A) Negative    Protein, UA 30 mg/dL (1+) (A) Negative    Leuk Esterase, UA Moderate (2+) (A) Negative    Nitrite, UA Negative Negative    Urobilinogen, UA 0.2 E.U./dL 0.2 - 1.0 E.U./dL   CBC Auto Differential    Specimen: Arm, Left; Blood   Result Value Ref Range    WBC 9.96 3.40 - 10.80 10*3/mm3    RBC 4.58 3.77 - 5.28 10*6/mm3    Hemoglobin 14.7 12.0 - 15.9 g/dL    Hematocrit 43.5 34.0 - 46.6 %    MCV 95.0 79.0 - 97.0 fL    MCH 32.1 26.6 - 33.0 pg    MCHC 33.8 31.5 - 35.7 g/dL    RDW 11.2 (L) 12.3 - 15.4 %    RDW-SD 38.5 37.0 - 54.0 fl    MPV 11.5 6.0 - 12.0 fL    Platelets 349 140 - 450 10*3/mm3    Neutrophil % 66.1 42.7 - 76.0 %    Lymphocyte % 23.0 19.6 - 45.3 %    Monocyte % 6.2 5.0 - 12.0 %    Eosinophil % 3.4 0.3 - 6.2 %    Basophil % 1.0 0.0 - 1.5 %    Immature Grans % 0.3 0.0 - 0.5 %    Neutrophils, Absolute 6.58 1.70 - 7.00 10*3/mm3    Lymphocytes, Absolute 2.29 0.70 - 3.10 10*3/mm3    Monocytes, Absolute 0.62 0.10 - 0.90 10*3/mm3    Eosinophils, Absolute 0.34 0.00 -  0.40 10*3/mm3    Basophils, Absolute 0.10 0.00 - 0.20 10*3/mm3    Immature Grans, Absolute 0.03 0.00 - 0.05 10*3/mm3    nRBC 0.0 0.0 - 0.2 /100 WBC   Urinalysis, Microscopic Only - Urine, Clean Catch    Specimen: Urine, Clean Catch   Result Value Ref Range    RBC, UA 6-12 (A) None Seen /HPF    WBC, UA Too Numerous to Count (A) None Seen /HPF    Bacteria, UA None Seen None Seen /HPF    Squamous Epithelial Cells, UA 0-2 None Seen, 0-2 /HPF    Hyaline Casts, UA None Seen None Seen /LPF    Methodology Automated Microscopy         Result Review :     Assessment & Plan      History of ulcerative colitis status post total proctocolectomy with J-pouch performed at [facility].  Since that time she has developed perirectal abscess with seton drain placement July 2021 by Dr. Gardner.  She is on chronic Flagyl for pouchitis.  She has a history of sclerosing cholangitis.  She is thought possibly to have Crohn's disease.     Discussion with patient.  I recommended starting with pouchoscopy with biopsy to help with evaluation for possible Crohn's disease as well as to evaluate the pouch mucosa to help with operative planning for her anal fistula.  Benefits and alternatives discussed.  Risk procedure including bleeding and perforation discussed.  All questions answered.  She agrees with the plan.  Enema on the day of the procedure.  We will refer her to GI to help establish local care.  All questions answered.  She agrees with the plan.  Thank you for the consult           This document has been electronically signed by Jim Villatoro MD  May 25, 2023 11:59 EDT

## 2023-05-25 NOTE — H&P (VIEW-ONLY)
General Surgery/Colorectal Surgery Note    Patient Name:  Claudia Wilkins  YOB: 1995  [id number]    Referring Provider: Demi Fernandez MD      Patient Care Team:  Demi Fernandez MD as PCP - General (Internal Medicine)    Chief complaint establish care    Subjective .     History of present illness:    History of ulcerative colitis status post total proctocolectomy with J-pouch performed at [facility].  Since that time she has developed perirectal abscess with seton drain placement July 2021 by Dr. Gardner.  She is on chronic Flagyl for pouchitis.  She has a history of sclerosing cholangitis.  She is thought possibly to have Crohn's disease.  She has urgency to have bowel movements.  No fecal incontinence.  No mucus in her stool.  No abdominal pain.  She wishes to establish care here.      History:  Past Medical History:   Diagnosis Date   • Anemia    • Anxiety    • AR (allergic rhinitis)    • Arthritis    • Celiac disease    • CFS (chronic fatigue syndrome)    • Chronic liver disease    • Chronic pain    • Crohn's disease    • Depression    • Fibromyalgia    • Fibromyalgia, primary    • Gastric ulcer    • H/O psychiatric care    • Hemorrhoids    • Hypermobility syndrome    • Hypothyroidism    • IBS (irritable bowel syndrome)    • Insomnia    • Lactose intolerance    • Moderate episode of recurrent major depressive disorder 01/29/2018    CONTINUE CURRENT MEDS    • Nasal crusting    • Osteopenia    • Pancreatitis    • Pouchitis    • Primary sclerosing cholangitis    • PTSD (post-traumatic stress disorder)    • RA (rheumatoid arthritis)    • Recurrent epistaxis    • Scoliosis    • Ulcerative colitis        Past Surgical History:   Procedure Laterality Date   • COLECTOMY TOTAL  2006    COLECTOMY W/ ILEOANAI J POUCH & SMALL BOWEL PULL THROUGH    • COLON SURGERY     • COLONOSCOPY     • ENDOSCOPY     • INCISION AND DRAINAGE PERIRECTAL ABSCESS         Family History   Problem Relation  Age of Onset   • Prostate cancer Paternal Grandfather    • Stroke Other    • Heart disease Other    • Kidney disease Other    • Diabetes Other    • Rectal cancer Other    • Colon cancer Other    • Anxiety disorder Mother    • Cancer Maternal Grandfather    • Heart disease Maternal Grandfather    • Arthritis Maternal Grandmother    • Asthma Sister    • Diabetes Sister        Social History     Tobacco Use   • Smoking status: Never   • Smokeless tobacco: Never   Vaping Use   • Vaping Use: Never used   Substance Use Topics   • Alcohol use: Never   • Drug use: Never       Review of Systems  All systems were reviewed and negative except for:   Review of Systems   Constitutional: Negative for chills, fever and unexpected weight loss.   HENT: Negative for congestion, nosebleeds and voice change.    Eyes: Negative for blurred vision, double vision and discharge.   Respiratory: Negative for apnea, chest tightness and shortness of breath.    Cardiovascular: Negative for chest pain and leg swelling.   Gastrointestinal:        See HPI   Endocrine: Negative for cold intolerance and heat intolerance.   Genitourinary: Negative for dysuria, hematuria and urgency.   Musculoskeletal: Negative for back pain, joint swelling and neck pain.   Skin: Negative for color change and dry skin.   Neurological: Negative for dizziness and confusion.   Hematological: Negative for adenopathy.   Psychiatric/Behavioral: Negative for agitation and behavioral problems.     MEDS:  Prior to Admission medications    Medication Sig Start Date End Date Taking? Authorizing Provider   Calcium Carb-Cholecalciferol (Oyster Shell Calcium w/D) 500-5 MG-MCG tablet TAKE 1 TABLET BY MOUTH TWICE DAILY 12/8/22  Yes Demi Fernandez MD   cyclobenzaprine (FLEXERIL) 5 MG tablet Take 1 tablet by mouth 2 (Two) Times a Day As Needed for Muscle Spasms. 3/9/23  Yes Demi Fernandez MD   FeroSul 325 (65 Fe) MG tablet TAKE 1 TABLET BY MOUTH TWICE DAILY 12/8/22  Yes Jim  Demi Severino MD   hydrOXYzine (ATARAX) 25 MG tablet Take 1 tablet by mouth 2 (Two) Times a Day. 3/9/23  Yes Demi Fernandez MD   Incontinence Supply Disposable (Comfort Protect Adult Diaper/M) misc 1 each 5 (Five) Times a Day As Needed (for incontinence). 9/13/22  Yes Adrian Jessica APRN   Lactobacillus Acid-Pectin (Acidophilus/Pectin) capsule Take 1 capsule by mouth 2 (Two) Times a Day. 10/27/22  Yes Adrian Jessica APRN   levothyroxine (Synthroid) 100 MCG tablet Take 1 tablet by mouth Daily. 3/13/23  Yes Demi Fernandez MD   loperamide (IMODIUM) 2 MG capsule Take 1 capsule by mouth 2 (Two) Times a Day. 3/9/23  Yes Demi Fernandez MD   metroNIDAZOLE (FLAGYL) 250 MG tablet TAKE 1 TABLET EVERY MORNING 5/11/23  Yes Demi Fernandez MD   NON FORMULARY Pain medicated lotion with Gabapentin. Patient uses for Chronic pain/Arthritis   Yes Obdulia Alvarez MD   sertraline (ZOLOFT) 50 MG tablet Take 1 tablet by mouth Daily. 3/9/23  Yes Demi Fernandez MD   ursodiol (ACTIGALL) 300 MG capsule Take 2 capsules by mouth 2 (Two) Times a Day. 3/9/23  Yes Demi Fernandez MD   Adalimumab (HUMIRA) 40 MG/0.4ML Pen-injector Kit Inject 40 mg under the skin into the appropriate area as directed Every 14 (Fourteen) Days.  Patient not taking: Reported on 5/18/2023 8/30/22   Obdulia Alvarez MD        Allergies:  Gluten meal and Latex    Objective     Vital Signs        Physical Exam:     General Appearance:    Alert, cooperative, in no acute distress   Head:    Normocephalic, without obvious abnormality, atraumatic   Eyes:          Conjunctivae and sclerae normal, no icterus,     Ears:    Ears appear intact with no abnormalities noted   Throat:   No oral lesions, no thrush, oral mucosa moist   Neck:   No adenopathy, supple, trachea midline, no thyromegaly   Back:     No kyphosis present, no scoliosis present, no skin lesions,      erythema or scars, no tenderness to percussion or                   palpation,   " range of motion normal   Lungs:     Clear to auscultation,respirations regular, even and                  unlabored    Heart:    Regular rhythm and normal rate, normal S1 and S2, no            murmur, no gallop, no rub, no click   Chest Wall:    No abnormalities observed   Abdomen:     Normal bowel sounds, no masses, no organomegaly, soft        non-tender, non-distended, no guarding, no rebound                tenderness   Rectal:        Extremities:   Moves all extremities well, no edema, no cyanosis, no             redness   Pulses:   Pulses palpable and equal bilaterally   Skin:   No bleeding, bruising or rash   Lymph nodes:   No palpable adenopathy   Neurologic:   A/o x 4 with no deficits       Results Review:   {Results Review:[id number]::\"I reviewed the patient's new clinical results.\"    LABS/IMAGING:  Results for orders placed or performed in visit on 10/27/22   Urine Culture - Urine, Urine, Clean Catch    Specimen: Urine, Clean Catch   Result Value Ref Range    Urine Culture No growth    Comprehensive Metabolic Panel    Specimen: Arm, Left; Blood   Result Value Ref Range    Glucose 58 (L) 65 - 99 mg/dL    BUN 8 6 - 20 mg/dL    Creatinine 0.76 0.57 - 1.00 mg/dL    Sodium 137 136 - 145 mmol/L    Potassium 3.8 3.5 - 5.2 mmol/L    Chloride 103 98 - 107 mmol/L    CO2 25.3 22.0 - 29.0 mmol/L    Calcium 9.6 8.6 - 10.5 mg/dL    Total Protein 7.7 6.0 - 8.5 g/dL    Albumin 4.80 3.50 - 5.20 g/dL    ALT (SGPT) 23 1 - 33 U/L    AST (SGOT) 26 1 - 32 U/L    Alkaline Phosphatase 85 39 - 117 U/L    Total Bilirubin 0.4 0.0 - 1.2 mg/dL    Globulin 2.9 gm/dL    A/G Ratio 1.7 g/dL    BUN/Creatinine Ratio 10.5 7.0 - 25.0    Anion Gap 8.7 5.0 - 15.0 mmol/L    eGFR 111.0 >60.0 mL/min/1.73   Lipid Panel    Specimen: Arm, Left; Blood   Result Value Ref Range    Total Cholesterol 156 0 - 200 mg/dL    Triglycerides 50 0 - 150 mg/dL    HDL Cholesterol 82 (H) 40 - 60 mg/dL    LDL Cholesterol  63 0 - 100 mg/dL    VLDL Cholesterol 11 5 - 40 " mg/dL    LDL/HDL Ratio 0.78    Iron Profile    Specimen: Arm, Left; Blood   Result Value Ref Range    Iron 105 37 - 145 mcg/dL    Iron Saturation 35 20 - 50 %    Transferrin 203 200 - 360 mg/dL    TIBC 302 298 - 536 mcg/dL   Vitamin B12 & Folate    Specimen: Arm, Left; Blood   Result Value Ref Range    Folate 14.50 4.78 - 24.20 ng/mL    Vitamin B-12 494 211 - 946 pg/mL   TSH    Specimen: Arm, Left; Blood   Result Value Ref Range    TSH 7.000 (H) 0.270 - 4.200 uIU/mL   T4, Free    Specimen: Arm, Left; Blood   Result Value Ref Range    Free T4 1.08 0.93 - 1.70 ng/dL   Urinalysis With Culture If Indicated - Urine, Clean Catch    Specimen: Urine, Clean Catch   Result Value Ref Range    Color, UA Dark Yellow (A) Yellow, Straw    Appearance, UA Turbid (A) Clear    pH, UA 6.0 5.0 - 8.0    Specific Gravity, UA >=1.030 1.005 - 1.030    Glucose, UA Negative Negative    Ketones, UA Negative Negative    Bilirubin, UA Negative Negative    Blood, UA Trace (A) Negative    Protein, UA 30 mg/dL (1+) (A) Negative    Leuk Esterase, UA Moderate (2+) (A) Negative    Nitrite, UA Negative Negative    Urobilinogen, UA 0.2 E.U./dL 0.2 - 1.0 E.U./dL   CBC Auto Differential    Specimen: Arm, Left; Blood   Result Value Ref Range    WBC 9.96 3.40 - 10.80 10*3/mm3    RBC 4.58 3.77 - 5.28 10*6/mm3    Hemoglobin 14.7 12.0 - 15.9 g/dL    Hematocrit 43.5 34.0 - 46.6 %    MCV 95.0 79.0 - 97.0 fL    MCH 32.1 26.6 - 33.0 pg    MCHC 33.8 31.5 - 35.7 g/dL    RDW 11.2 (L) 12.3 - 15.4 %    RDW-SD 38.5 37.0 - 54.0 fl    MPV 11.5 6.0 - 12.0 fL    Platelets 349 140 - 450 10*3/mm3    Neutrophil % 66.1 42.7 - 76.0 %    Lymphocyte % 23.0 19.6 - 45.3 %    Monocyte % 6.2 5.0 - 12.0 %    Eosinophil % 3.4 0.3 - 6.2 %    Basophil % 1.0 0.0 - 1.5 %    Immature Grans % 0.3 0.0 - 0.5 %    Neutrophils, Absolute 6.58 1.70 - 7.00 10*3/mm3    Lymphocytes, Absolute 2.29 0.70 - 3.10 10*3/mm3    Monocytes, Absolute 0.62 0.10 - 0.90 10*3/mm3    Eosinophils, Absolute 0.34 0.00 -  0.40 10*3/mm3    Basophils, Absolute 0.10 0.00 - 0.20 10*3/mm3    Immature Grans, Absolute 0.03 0.00 - 0.05 10*3/mm3    nRBC 0.0 0.0 - 0.2 /100 WBC   Urinalysis, Microscopic Only - Urine, Clean Catch    Specimen: Urine, Clean Catch   Result Value Ref Range    RBC, UA 6-12 (A) None Seen /HPF    WBC, UA Too Numerous to Count (A) None Seen /HPF    Bacteria, UA None Seen None Seen /HPF    Squamous Epithelial Cells, UA 0-2 None Seen, 0-2 /HPF    Hyaline Casts, UA None Seen None Seen /LPF    Methodology Automated Microscopy         Result Review :     Assessment & Plan      History of ulcerative colitis status post total proctocolectomy with J-pouch performed at [facility].  Since that time she has developed perirectal abscess with seton drain placement July 2021 by Dr. Gardner.  She is on chronic Flagyl for pouchitis.  She has a history of sclerosing cholangitis.  She is thought possibly to have Crohn's disease.     Discussion with patient.  I recommended starting with pouchoscopy with biopsy to help with evaluation for possible Crohn's disease as well as to evaluate the pouch mucosa to help with operative planning for her anal fistula.  Benefits and alternatives discussed.  Risk procedure including bleeding and perforation discussed.  All questions answered.  She agrees with the plan.  Enema on the day of the procedure.  We will refer her to GI to help establish local care.  All questions answered.  She agrees with the plan.  Thank you for the consult           This document has been electronically signed by Jim Villatoro MD  May 25, 2023 11:59 EDT

## 2023-05-29 RX ORDER — CYCLOBENZAPRINE HCL 5 MG
TABLET ORAL
Qty: 180 TABLET | Refills: 0 | Status: SHIPPED | OUTPATIENT
Start: 2023-05-29

## 2023-05-29 RX ORDER — LEVOTHYROXINE SODIUM 100 MCG
TABLET ORAL
Qty: 90 TABLET | Refills: 0 | Status: SHIPPED | OUTPATIENT
Start: 2023-05-29

## 2023-05-30 ENCOUNTER — TELEPHONE (OUTPATIENT)
Dept: SURGERY | Facility: CLINIC | Age: 28
End: 2023-05-30

## 2023-05-30 NOTE — PRE-PROCEDURE INSTRUCTIONS
Instructed on arrival time.  Will need  over the age of 18 to drive them home.  Do not take any medications the day of the procedure, but bring them with you.  Discussed diet and bowel prep.  Come to entrance C.  Instructed to call if any questions or concerns.  Mother stated that bowel prep would be done at hospital per Dr. Villatoro.

## 2023-05-30 NOTE — TELEPHONE ENCOUNTER
Called and spoke with patients mother and advised of 1:00 p.m. arrival time. She v/u and had no further questions.

## 2023-06-02 ENCOUNTER — ANESTHESIA EVENT (OUTPATIENT)
Dept: GASTROENTEROLOGY | Facility: HOSPITAL | Age: 28
End: 2023-06-02
Payer: MEDICARE

## 2023-06-02 ENCOUNTER — HOSPITAL ENCOUNTER (OUTPATIENT)
Facility: HOSPITAL | Age: 28
Setting detail: HOSPITAL OUTPATIENT SURGERY
Discharge: HOME | End: 2023-06-02
Attending: SURGERY | Admitting: SURGERY
Payer: MEDICARE

## 2023-06-02 ENCOUNTER — ANESTHESIA (OUTPATIENT)
Dept: GASTROENTEROLOGY | Facility: HOSPITAL | Age: 28
End: 2023-06-02
Payer: MEDICARE

## 2023-06-02 VITALS
RESPIRATION RATE: 14 BRPM | HEIGHT: 62 IN | TEMPERATURE: 98.5 F | HEART RATE: 106 BPM | SYSTOLIC BLOOD PRESSURE: 98 MMHG | WEIGHT: 119.05 LBS | OXYGEN SATURATION: 95 % | BODY MASS INDEX: 21.91 KG/M2 | DIASTOLIC BLOOD PRESSURE: 63 MMHG

## 2023-06-02 DIAGNOSIS — K91.850 INFLAMMATION OF ILEOANAL POUCH: ICD-10-CM

## 2023-06-02 DIAGNOSIS — K91.89 INFLAMMATION OF ILEOANAL POUCH: ICD-10-CM

## 2023-06-02 LAB — B-HCG UR QL: NEGATIVE

## 2023-06-02 PROCEDURE — 25010000002 PROPOFOL 10 MG/ML EMULSION: Performed by: NURSE ANESTHETIST, CERTIFIED REGISTERED

## 2023-06-02 PROCEDURE — 25010000002 MIDAZOLAM PER 1MG: Performed by: ANESTHESIOLOGY

## 2023-06-02 PROCEDURE — 88305 TISSUE EXAM BY PATHOLOGIST: CPT | Performed by: SURGERY

## 2023-06-02 PROCEDURE — 81025 URINE PREGNANCY TEST: CPT | Performed by: ANESTHESIOLOGY

## 2023-06-02 PROCEDURE — 44386 ENDOSCOPY BOWEL POUCH/BIOP: CPT | Performed by: SURGERY

## 2023-06-02 RX ORDER — PROPOFOL 10 MG/ML
VIAL (ML) INTRAVENOUS AS NEEDED
Status: DISCONTINUED | OUTPATIENT
Start: 2023-06-02 | End: 2023-06-02 | Stop reason: SURG

## 2023-06-02 RX ORDER — DEXMEDETOMIDINE HYDROCHLORIDE 100 UG/ML
INJECTION, SOLUTION INTRAVENOUS AS NEEDED
Status: DISCONTINUED | OUTPATIENT
Start: 2023-06-02 | End: 2023-06-02 | Stop reason: SURG

## 2023-06-02 RX ORDER — MIDAZOLAM HYDROCHLORIDE 2 MG/2ML
2 INJECTION, SOLUTION INTRAMUSCULAR; INTRAVENOUS
Status: DISCONTINUED | OUTPATIENT
Start: 2023-06-02 | End: 2023-06-02 | Stop reason: HOSPADM

## 2023-06-02 RX ORDER — SODIUM CHLORIDE 9 MG/ML
40 INJECTION, SOLUTION INTRAVENOUS AS NEEDED
Status: DISCONTINUED | OUTPATIENT
Start: 2023-06-02 | End: 2023-06-02 | Stop reason: HOSPADM

## 2023-06-02 RX ORDER — SODIUM CHLORIDE, SODIUM LACTATE, POTASSIUM CHLORIDE, CALCIUM CHLORIDE 600; 310; 30; 20 MG/100ML; MG/100ML; MG/100ML; MG/100ML
INJECTION, SOLUTION INTRAVENOUS CONTINUOUS PRN
Status: DISCONTINUED | OUTPATIENT
Start: 2023-06-02 | End: 2023-06-02 | Stop reason: SURG

## 2023-06-02 RX ORDER — SODIUM CHLORIDE 0.9 % (FLUSH) 0.9 %
10 SYRINGE (ML) INJECTION AS NEEDED
Status: DISCONTINUED | OUTPATIENT
Start: 2023-06-02 | End: 2023-06-02 | Stop reason: HOSPADM

## 2023-06-02 RX ORDER — SODIUM CHLORIDE, SODIUM LACTATE, POTASSIUM CHLORIDE, CALCIUM CHLORIDE 600; 310; 30; 20 MG/100ML; MG/100ML; MG/100ML; MG/100ML
30 INJECTION, SOLUTION INTRAVENOUS CONTINUOUS
Status: DISCONTINUED | OUTPATIENT
Start: 2023-06-02 | End: 2023-06-02 | Stop reason: HOSPADM

## 2023-06-02 RX ORDER — SODIUM CHLORIDE 0.9 % (FLUSH) 0.9 %
3 SYRINGE (ML) INJECTION EVERY 12 HOURS SCHEDULED
Status: DISCONTINUED | OUTPATIENT
Start: 2023-06-02 | End: 2023-06-02 | Stop reason: HOSPADM

## 2023-06-02 RX ORDER — SODIUM PHOSPHATE,MONO-DIBASIC 19G-7G/118
1 ENEMA (ML) RECTAL ONCE
Status: DISCONTINUED | OUTPATIENT
Start: 2023-06-02 | End: 2023-06-02 | Stop reason: HOSPADM

## 2023-06-02 RX ORDER — LIDOCAINE HYDROCHLORIDE 20 MG/ML
INJECTION, SOLUTION EPIDURAL; INFILTRATION; INTRACAUDAL; PERINEURAL AS NEEDED
Status: DISCONTINUED | OUTPATIENT
Start: 2023-06-02 | End: 2023-06-02 | Stop reason: SURG

## 2023-06-02 RX ADMIN — SODIUM CHLORIDE, POTASSIUM CHLORIDE, SODIUM LACTATE AND CALCIUM CHLORIDE: 600; 310; 30; 20 INJECTION, SOLUTION INTRAVENOUS at 14:52

## 2023-06-02 RX ADMIN — MIDAZOLAM HYDROCHLORIDE 2 MG: 1 INJECTION, SOLUTION INTRAMUSCULAR; INTRAVENOUS at 13:55

## 2023-06-02 RX ADMIN — DEXMEDETOMIDINE HYDROCHLORIDE 20 MCG: 100 INJECTION, SOLUTION, CONCENTRATE INTRAVENOUS at 15:08

## 2023-06-02 RX ADMIN — PROPOFOL 150 MCG/KG/MIN: 10 INJECTION, EMULSION INTRAVENOUS at 15:01

## 2023-06-02 RX ADMIN — LIDOCAINE HYDROCHLORIDE 100 MG: 20 INJECTION, SOLUTION EPIDURAL; INFILTRATION; INTRACAUDAL; PERINEURAL at 15:01

## 2023-06-02 RX ADMIN — PROPOFOL 150 MG: 10 INJECTION, EMULSION INTRAVENOUS at 15:01

## 2023-06-02 NOTE — ANESTHESIA PREPROCEDURE EVALUATION
Anesthesia Evaluation     Patient summary reviewed and Nursing notes reviewed   no history of anesthetic complications:  NPO Solid Status: > 8 hours  NPO Liquid Status: > 2 hours           Airway   Mallampati: II  TM distance: >3 FB  Neck ROM: full  No difficulty expected  Dental          Pulmonary - negative pulmonary ROS and normal exam    breath sounds clear to auscultation  Cardiovascular - negative cardio ROS and normal exam  Exercise tolerance: good (4-7 METS)    Rhythm: regular  Rate: normal        Neuro/Psych- negative ROS  GI/Hepatic/Renal/Endo    (+)  PUD,  liver disease, thyroid problem     Musculoskeletal (-) negative ROS    Abdominal    Substance History - negative use     OB/GYN negative ob/gyn ROS         Other - negative ROS       ROS/Med Hx Other: PAT Nursing Notes unavailable.                   Anesthesia Plan    ASA 3     general       Anesthetic plan, risks, benefits, and alternatives have been provided, discussed and informed consent has been obtained with: patient and mother.        CODE STATUS:

## 2023-06-02 NOTE — NURSING NOTE
Patient requested to be under anesthesia during enema. MD notified and disagrees, as would not be effective as patient would not be able to use the restroom post enema. Holding enema at this time. MD and patient aware.

## 2023-06-02 NOTE — OP NOTE
OP NOTE  SIGMOIDOSCOPY  Procedure Report    Patient Name:  Claudia Wilkins  YOB: 1995  [id number]    Date of Surgery:  6/2/2023     Indications: Cellulitis clinic note for indications, discussion of risk benefits and alternatives    Pre-op Diagnosis:   Inflammation of ileoanal pouch [K91.89, K91.850]       Post-Op Diagnosis Codes:     * Inflammation of ileoanal pouch [K91.89, K91.850]    Procedure/CPT® Codes:      Procedure(s):  Flexible pouchoscopy with cold biopsy of ileum and pouch    Staff:  Surgeon(s):  Jim Villatoro MD         Anesthesia: Monitored Anesthesia Care, Local    Estimated Blood Loss: minimal    Implants:    Nothing was implanted during the procedure    Specimen:          Specimens     ID Source Type Tests Collected By Collected At Frozen?    A Small Intestine Tissue · TISSUE PATHOLOGY EXAM   Jim Villatoro MD 6/2/23 1510 No    Description: ILEUM BIOPSIES    B Small Intestine Tissue · TISSUE PATHOLOGY EXAM   Jim Villatoro MD 6/2/23 1511 No    Description: SMALL BOWEL POUCH BIOPSIES            Findings: Left anterior Transphincteric fistula noted with noncutting seton drain in place.  No evidence of infection.  Distal ileum to 25 cm from the anus with no inflammation.  Cold biopsies taken.  Some inflammation of the pouch with multiple cold biopsies taken.  No stricture.    Complications: None    Description of Procedure: After all questions were answered, consent was verified.  She was brought to the endoscopy suite.  Placed in left lateral position.  Propofol given.  Critical timeout taken.  Began the procedure with exam around the anus with left anterior transsphincteric fistula noted with noncutting seton drain in place.  No evidence of infection.  Digital rectal exam with no mass and no blood.  I then advanced an upper scope to the distal ileum 25 cm from the anal verge.  No evidence of inflammation.  Multiple cold biopsies taken.  No evidence of  stricture to the pouch.  Mild inflammation noted in the pouch with multiple cold biopsies taken.  I was present for the procedure.    Jim Villatoro MD     Date: 6/2/2023  Time: 15:15 EDT

## 2023-06-02 NOTE — ANESTHESIA POSTPROCEDURE EVALUATION
Patient: Claudia Wilkins    Procedure Summary     Date: 06/02/23 Room / Location: [facility] ENDOSCOPY 3 / [facility] ENDOSCOPY    Anesthesia Start: 1453 Anesthesia Stop: 1526    Procedure: POUCHOSCOPY WITH BIOPSIES (Anus) Diagnosis:       Inflammation of ileoanal pouch      (Inflammation of ileoanal pouch [K91.89, K91.850])    Surgeons: Jim Villatoro MD Provider: Mu Rosenberg MD    Anesthesia Type: general ASA Status: 3          Anesthesia Type: general    Vitals  Vitals Value Taken Time   BP 98/63 06/02/23 1534   Temp 36.9 °C (98.5 °F) 06/02/23 1533   Pulse 107 06/02/23 1535   Resp 14 06/02/23 1533   SpO2 95 % 06/02/23 1535   Vitals shown include unvalidated device data.        Post Anesthesia Care and Evaluation    Patient location during evaluation: bedside  Patient participation: complete - patient participated  Level of consciousness: awake  Pain management: adequate    Airway patency: patent  PONV Status: none  Cardiovascular status: acceptable and stable  Respiratory status: acceptable  Hydration status: acceptable    Comments: An Anesthesiologist personally participated in the most demanding procedures (including induction and emergence if applicable) in the anesthesia plan, monitored the course of anesthesia administration at frequent intervals and remained physically present and available for immediate diagnosis and treatment of emergencies.

## 2023-06-06 LAB
CYTO UR: NORMAL
LAB AP CASE REPORT: NORMAL
LAB AP CLINICAL INFORMATION: NORMAL
PATH REPORT.FINAL DX SPEC: NORMAL
PATH REPORT.GROSS SPEC: NORMAL

## 2023-06-07 ENCOUNTER — TELEPHONE (OUTPATIENT)
Dept: GASTROENTEROLOGY | Facility: CLINIC | Age: 28
End: 2023-06-07
Payer: MEDICARE

## 2023-06-07 ENCOUNTER — TELEPHONE (OUTPATIENT)
Dept: SURGERY | Facility: CLINIC | Age: 28
End: 2023-06-07
Payer: MEDICARE

## 2023-06-07 NOTE — TELEPHONE ENCOUNTER
CALLED AND SPOKE WITH PATIENT'S MOTHER AND EXPLAINED THAT IT HAS BEEN RECOMMENDED THAT THE PATIENT STAY WITH [facility].

## 2023-06-07 NOTE — TELEPHONE ENCOUNTER
Claudia Wilkins, 1995, has requested to transfer care from [facility] to [facility].    Reason for transfer: PT PREFERENCE    Please review the patients records for possible transfer of care. The patient is aware that it is at the receiving provider's discretion to approve or deny this transfer request.

## 2023-06-07 NOTE — TELEPHONE ENCOUNTER
Patient mother was transferred to our office from Dr Nielsen due to transfer of care being denied.  While speaking with Arcelia (mother) she stated that Dr Villatoro wanted her daughter to follow up with gastro provider before surgery was scheduled. Patient last saw Reba Graham 05/18/2021, next available appointment that Reba had was 12/18/2023.  Patient mother was expressed her frustration with that being the appointment date that we had and that we would not let her transfer care to Dr Nielsen.  Advised that the transfer of care was denied by Dr Nielsen's office and there was nothing I could do about that.  Patients mother stated that she would go out of network because she could not wait that long for an appointment.

## 2023-06-07 NOTE — TELEPHONE ENCOUNTER
PATIENT'S MOTHER, ISABELA, WOULD LIKE DO SPEAK TO DR. LOGAN OR STAFF, REGARDING WHAT TO DO WITH PATIENT CARE.    SHE SAID DR. LOGAN SCOPED HER DAUGHTER RECENTLY AND WANTED HER TO CONSULT WITH GI.     SHE SAID SHE REQUESTED DR. HDZ, BUT THAT WAS DENIED, AND SHE WAS TOLD PATIENT HAS TO SEE DR. BUNN.  SHE SAID DR. BUNN DOESN'T HAVE AN OPENING UNTIL DECEMBER.  SHE DIDN'T SCHEDULE THE APPOINTMENT.    SHE DOESN'T WANT HER DAUGHTER TO HAVE TO WAIT THAT LONG.  SHE SAID AN APPOINTMENT WITH GI SOON, DOESN'T LOOK LIKE IT IS GOING TO BE POSSIBLE, UNLESS SHE GOES OUT OF NETWORK TO SCHEDULE AN APPOINTMENT.  SHE DOESN'T WANT TO HAVE TO DO THIS.    SHE SAID SHE KNOWS DR. LOGAN IS WAITING ON PATHOLOGY, AND WAS WANTING INPUT FROM DR. HDZ.

## 2023-06-08 ENCOUNTER — TELEPHONE (OUTPATIENT)
Dept: GASTROENTEROLOGY | Facility: CLINIC | Age: 28
End: 2023-06-08
Payer: MEDICARE

## 2023-06-08 NOTE — TELEPHONE ENCOUNTER
Spoke with Dr gilbert office and they advised patient (or mom) could call and request a transfer of care and Dr Nielsen would review patients chart and decide if he can see the patient or not. They also advised that his next available appt was going to still be December. I called and spoke with Arcelia (mom) and relayed all information to her, she v/u and would call the office and speak to them directly.

## 2023-06-08 NOTE — TELEPHONE ENCOUNTER
Dr Villatoro ofc checking status of referral for patient.  Referral initially sent to [address], routed to [facility] for continuing care with Reba Graham. Per communication notes [facility] unable to contact patient.   Office states patient request to be seen by Dr Nielsen, advised a Transfer of Care request would have to be requested from patient and reviewed by our providers for approval.

## 2023-06-14 ENCOUNTER — OFFICE VISIT (OUTPATIENT)
Dept: SURGERY | Facility: CLINIC | Age: 28
End: 2023-06-14
Payer: MEDICARE

## 2023-06-14 ENCOUNTER — PREP FOR SURGERY (OUTPATIENT)
Dept: SURGERY | Facility: CLINIC | Age: 28
End: 2023-06-14

## 2023-06-14 VITALS — BODY MASS INDEX: 22.56 KG/M2 | WEIGHT: 122.6 LBS | RESPIRATION RATE: 14 BRPM | HEIGHT: 62 IN

## 2023-06-14 DIAGNOSIS — K60.3 ANAL FISTULA: Primary | ICD-10-CM

## 2023-06-14 DIAGNOSIS — K60.3 ANAL FISTULA: ICD-10-CM

## 2023-06-14 DIAGNOSIS — M06.9 RHEUMATOID ARTHRITIS, INVOLVING UNSPECIFIED SITE, UNSPECIFIED WHETHER RHEUMATOID FACTOR PRESENT: Primary | ICD-10-CM

## 2023-06-14 PROCEDURE — 1159F MED LIST DOCD IN RCRD: CPT | Performed by: SURGERY

## 2023-06-14 PROCEDURE — 99213 OFFICE O/P EST LOW 20 MIN: CPT | Performed by: SURGERY

## 2023-06-14 PROCEDURE — 1160F RVW MEDS BY RX/DR IN RCRD: CPT | Performed by: SURGERY

## 2023-06-14 RX ORDER — SODIUM CHLORIDE, SODIUM LACTATE, POTASSIUM CHLORIDE, CALCIUM CHLORIDE 600; 310; 30; 20 MG/100ML; MG/100ML; MG/100ML; MG/100ML
50 INJECTION, SOLUTION INTRAVENOUS CONTINUOUS
OUTPATIENT
Start: 2023-06-14

## 2023-06-14 RX ORDER — SODIUM CHLORIDE 0.9 % (FLUSH) 0.9 %
10 SYRINGE (ML) INJECTION EVERY 12 HOURS SCHEDULED
OUTPATIENT
Start: 2023-06-14

## 2023-06-14 RX ORDER — SODIUM CHLORIDE 0.9 % (FLUSH) 0.9 %
10 SYRINGE (ML) INJECTION AS NEEDED
OUTPATIENT
Start: 2023-06-14

## 2023-06-14 RX ORDER — SODIUM CHLORIDE 9 MG/ML
40 INJECTION, SOLUTION INTRAVENOUS AS NEEDED
OUTPATIENT
Start: 2023-06-14

## 2023-06-14 NOTE — LETTER
June 19, 2023       No Recipients    Patient: Claudia Wilkins   YOB: 1995   Date of Visit: 6/14/2023       Dear Dr. Osman Recipients:    Thank you for referring Claudia Wilkins to me for evaluation. Below are the relevant portions of my assessment and plan of care.    If you have questions, please do not hesitate to call me. I look forward to following Claudia along with you.         Sincerely,        Jim Villatoro MD        CC:   No Recipients    Jim Villatoro MD  06/19/23 0757  Sign when Signing Visit  General Surgery/Colorectal Surgery Note    Patient Name:  Claudia Wilkins  YOB: 1995  [id number]    Referring Provider: No ref. provider found      Patient Care Team:  Demi Fernandez MD as PCP - General (Internal Medicine)    Chief complaint follow-up    Subjective.     History of present illness:    History of ulcerative colitis status post total proctocolectomy with J-pouch performed at [facility].  Since that time she has developed perirectal abscess with seton drain placement July 2021 by Dr. Gardner.  She is on chronic Flagyl for pouchitis.  She has a history of sclerosing cholangitis.  She is thought possibly to have Crohn's disease.     Status post flexible pouchoscopy 6/2/2023 with left anterior transsphincteric fistula noted with noncutting seton drain in place.  No evidence of infection.  No inflammation of the distal ileum.  Minimal inflammation of the pouch.  No stricture.  Biopsies of the ileum and small bowel pouch with no activity, no granulomas, no dysplasia    She comes in for follow-up.  No changes since last seen.  Scheduled to see GI in the future.      History:  Past Medical History:   Diagnosis Date   • Anemia    • Anxiety    • AR (allergic rhinitis)    • Arthritis    • Celiac disease    • CFS (chronic fatigue syndrome)    • Chronic liver disease    • Chronic pain    • Crohn's disease    • Depression    • Fibromyalgia    •  Fibromyalgia, primary    • Gastric ulcer    • H/O psychiatric care    • Hemorrhoids    • Hypermobility syndrome    • Hypothyroidism    • IBS (irritable bowel syndrome)    • Insomnia    • Lactose intolerance    • Moderate episode of recurrent major depressive disorder 01/29/2018    CONTINUE CURRENT MEDS    • Nasal crusting    • Osteopenia    • Pancreatitis    • Pouchitis    • Primary sclerosing cholangitis    • PTSD (post-traumatic stress disorder)    • RA (rheumatoid arthritis)    • Recurrent epistaxis    • Scoliosis    • Ulcerative colitis        Past Surgical History:   Procedure Laterality Date   • COLECTOMY TOTAL  2006    COLECTOMY W/ ILEOANAI J POUCH & SMALL BOWEL PULL THROUGH    • COLON SURGERY     • COLONOSCOPY     • ENDOSCOPY     • INCISION AND DRAINAGE PERIRECTAL ABSCESS     • SIGMOIDOSCOPY N/A 6/2/2023    Procedure: POUCHOSCOPY WITH BIOPSIES;  Surgeon: Jim Villatoro MD;  Location: [facility] ENDOSCOPY;  Service: General;  Laterality: N/A;  ULCERATIVE COLITIS       Family History   Problem Relation Age of Onset   • Prostate cancer Paternal Grandfather    • Stroke Other    • Heart disease Other    • Kidney disease Other    • Diabetes Other    • Rectal cancer Other    • Colon cancer Other    • Anxiety disorder Mother    • Cancer Maternal Grandfather    • Heart disease Maternal Grandfather    • Arthritis Maternal Grandmother    • Asthma Sister    • Diabetes Sister        Social History     Tobacco Use   • Smoking status: Never   • Smokeless tobacco: Never   Vaping Use   • Vaping Use: Never used   Substance Use Topics   • Alcohol use: Never   • Drug use: Never       Review of Systems  All systems were reviewed and negative except for:   Review of Systems   Constitutional: Negative for chills, fever and unexpected weight loss.   HENT: Negative for congestion, nosebleeds and voice change.    Eyes: Negative for blurred vision, double vision and discharge.   Respiratory: Negative for apnea, chest tightness and  shortness of breath.    Cardiovascular: Negative for chest pain and leg swelling.   Gastrointestinal:        See HPI   Endocrine: Negative for cold intolerance and heat intolerance.   Genitourinary: Negative for dysuria, hematuria and urgency.   Musculoskeletal: Negative for back pain, joint swelling and neck pain.   Skin: Negative for color change and dry skin.   Neurological: Negative for dizziness and confusion.   Hematological: Negative for adenopathy.   Psychiatric/Behavioral: Negative for agitation and behavioral problems.     MEDS:  Prior to Admission medications    Medication Sig Start Date End Date Taking? Authorizing Provider   Calcium Carb-Cholecalciferol (Oyster Shell Calcium w/D) 500-5 MG-MCG tablet TAKE 1 TABLET BY MOUTH TWICE DAILY 12/8/22  Yes Demi Fernandez MD   cyclobenzaprine (FLEXERIL) 5 MG tablet TAKE 1 TABLET TWICE A DAY  AS NEEDED FOR MUSCLE SPASMS 5/29/23  Yes Demi Fernandez MD   FeroSul 325 (65 Fe) MG tablet TAKE 1 TABLET BY MOUTH TWICE DAILY 12/8/22  Yes Demi Fernandez MD   hydrOXYzine (ATARAX) 25 MG tablet Take 1 tablet by mouth 2 (Two) Times a Day. 3/9/23  Yes Demi Fernandez MD   Incontinence Supply Disposable (Comfort Protect Adult Diaper/M) misc 1 each 5 (Five) Times a Day As Needed (for incontinence). 9/13/22  Yes Adrian Jessica APRN   Lactobacillus Acid-Pectin (Acidophilus/Pectin) capsule Take 1 capsule by mouth 2 (Two) Times a Day. 10/27/22  Yes Adrian Jessica APRN   loperamide (IMODIUM) 2 MG capsule Take 1 capsule by mouth 2 (Two) Times a Day. 3/9/23  Yes Demi Fernandez MD   metroNIDAZOLE (FLAGYL) 250 MG tablet TAKE 1 TABLET EVERY MORNING 5/11/23  Yes Demi Fernandez MD   NON FORMULARY Pain medicated lotion with Gabapentin. Patient uses for Chronic pain/Arthritis   Yes ProviderObdulia MD   sertraline (ZOLOFT) 50 MG tablet Take 1 tablet by mouth Daily. 3/9/23  Yes Demi Fernandez MD   Synthroid 100 MCG tablet TAKE 1 TABLET DAILY 5/29/23  Yes  "Demi Fernandez MD   ursodiol (ACTIGALL) 300 MG capsule Take 2 capsules by mouth 2 (Two) Times a Day. 3/9/23  Yes Demi Fernandez MD   Adalimumab (HUMIRA) 40 MG/0.4ML Pen-injector Kit Inject 40 mg under the skin into the appropriate area as directed Every 14 (Fourteen) Days.  Patient not taking: Reported on 5/18/2023 8/30/22   Provider, MD Obdulia        Allergies:  Gluten meal and Latex    Objective    Vital Signs        Physical Exam:     General Appearance:    Alert, cooperative, in no acute distress   Head:    Normocephalic, without obvious abnormality, atraumatic   Eyes:          Conjunctivae and sclerae normal, no icterus,     Ears:    Ears appear intact with no abnormalities noted   Throat:   No oral lesions, no thrush, oral mucosa moist   Neck:   No adenopathy, supple, trachea midline, no thyromegaly   Back:     No kyphosis present, no scoliosis present, no skin lesions,      erythema or scars, no tenderness to percussion or                   palpation,   range of motion normal   Lungs:     Clear to auscultation,respirations regular, even and                  unlabored    Heart:    Regular rhythm and normal rate, normal S1 and S2, no            murmur, no gallop, no rub, no click   Chest Wall:    No abnormalities observed   Abdomen:     Normal bowel sounds, no masses, no organomegaly, soft        non-tender, non-distended, no guarding, no rebound                tenderness   Rectal:        Extremities:   Moves all extremities well, no edema, no cyanosis, no             redness   Pulses:   Pulses palpable and equal bilaterally   Skin:   No bleeding, bruising or rash   Lymph nodes:   No palpable adenopathy   Neurologic:   A/o x 4 with no deficits       Results Review:   {Results Review:34750::\"I reviewed the patient's new clinical results.\"    LABS/IMAGING:  Results for orders placed or performed during the hospital encounter of 06/02/23   Pregnancy, Urine - Urine, Clean Catch    Specimen: Urine, " "Clean Catch   Result Value Ref Range    HCG, Urine QL Negative Negative   Tissue Pathology Exam    Specimen: A: Small Intestine; Tissue    B: Small Intestine; Tissue   Result Value Ref Range    Case Report       Surgical Pathology Report                         Case: [id number]                                  Authorizing Provider:  Jim Villatoro MD  Collected:           06/02/2023 03:10 PM          Ordering Location:     [facility] Received:            06/05/2023 09:32 AM                                 SUITES                                                                       Pathologist:           Andressa Adam MD                                                     Specimens:   1) - Small Intestine, ILEUM BIOPSIES                                                                2) - Small Intestine, SMALL BOWEL POUCH BIOPSIES                                           Clinical Information       Inflammation of ileoanal pouch  History of ulcerative colitis      Final Diagnosis       1.  Ileum, biopsy:   -No significant pathologic change   -No activity   -No granulomas   -No dysplasia    2.  Small bowel pouch, biopsy:   -Nonspecific chronic inflammation   -No activity   -No granulomas   -No dysplasia          Gross Description       1. Small Intestine.  Received in formalin and labeled \" ileum\" are two fragments of tan soft tissue measuring 0.4-0.5 cm in greatest dimension. The specimen is entirely submitted in one cassette.    2. Small Intestine.  Received in formalin and labeled \" small bowel pouch\" is a 0.7 cm aggregate of tan soft tissue fragments. The specimen is entirely submitted in one cassette.   SALMA      Microscopic Description       Microscopic examination performed.          Result Review:     Assessment & Plan    History of ulcerative colitis status post total proctocolectomy with J-pouch performed at [facility].  Since that time she has developed perirectal " abscess with seton drain placement July 2021 by Dr. Gardner.  She is on chronic Flagyl for pouchitis.  She has a history of sclerosing cholangitis.  She is thought possibly to have Crohn's disease.     Status post flexible pouchoscopy 6/2/2023 with left anterior transsphincteric fistula noted with noncutting seton drain in place.  No evidence of infection.  No inflammation of the distal ileum.  Minimal inflammation of the pouch.  No stricture.  Biopsies of the ileum and small bowel pouch with no activity, no granulomas, no dysplasia    Discussion with patient and family.  I recommended surgery for the fistula.  I recommend a exam under anesthesia with likely ligation interest enteric fistula tract.  I explained the procedure and recovery.  Benefits and alternatives discussed.  Risk procedure including risk of anesthesia, bleeding, infection, recurrence, pain, heart attack, stroke, blood clot, pneumonia discussed.  All questions answered.  They agree with the plan.  Orders placed.  No enema.  I will see if GI can move up her appointment sooner.    Patient also has a history of rheumatoid arthritis.  She wishes to set up local care for that.  Referral made to rheumatology.              This document has been electronically signed by Jim Villatoro MD  June 19, 2023 07:52 EDT

## 2023-06-19 NOTE — PROGRESS NOTES
General Surgery/Colorectal Surgery Note    Patient Name:  Claudia Wilkins  YOB: 1995  [id number]    Referring Provider: No ref. provider found      Patient Care Team:  Demi Fernandez MD as PCP - General (Internal Medicine)    Chief complaint follow-up    Subjective .     History of present illness:    History of ulcerative colitis status post total proctocolectomy with J-pouch performed at [facility].  Since that time she has developed perirectal abscess with seton drain placement July 2021 by Dr. Gardner.  She is on chronic Flagyl for pouchitis.  She has a history of sclerosing cholangitis.  She is thought possibly to have Crohn's disease.     Status post flexible pouchoscopy 6/2/2023 with left anterior transsphincteric fistula noted with noncutting seton drain in place.  No evidence of infection.  No inflammation of the distal ileum.  Minimal inflammation of the pouch.  No stricture.  Biopsies of the ileum and small bowel pouch with no activity, no granulomas, no dysplasia    She comes in for follow-up.  No changes since last seen.  Scheduled to see GI in the future.      History:  Past Medical History:   Diagnosis Date    Anemia     Anxiety     AR (allergic rhinitis)     Arthritis     Celiac disease     CFS (chronic fatigue syndrome)     Chronic liver disease     Chronic pain     Crohn's disease     Depression     Fibromyalgia     Fibromyalgia, primary     Gastric ulcer     H/O psychiatric care     Hemorrhoids     Hypermobility syndrome     Hypothyroidism     IBS (irritable bowel syndrome)     Insomnia     Lactose intolerance     Moderate episode of recurrent major depressive disorder 01/29/2018    CONTINUE CURRENT MEDS     Nasal crusting     Osteopenia     Pancreatitis     Pouchitis     Primary sclerosing cholangitis     PTSD (post-traumatic stress disorder)     RA (rheumatoid arthritis)     Recurrent epistaxis     Scoliosis     Ulcerative colitis        Past Surgical History:    Procedure Laterality Date    COLECTOMY TOTAL  2006    COLECTOMY W/ ILEOANAI J POUCH & SMALL BOWEL PULL THROUGH     COLON SURGERY      COLONOSCOPY      ENDOSCOPY      INCISION AND DRAINAGE PERIRECTAL ABSCESS      SIGMOIDOSCOPY N/A 6/2/2023    Procedure: POUCHOSCOPY WITH BIOPSIES;  Surgeon: Jim Villatoro MD;  Location: [facility] ENDOSCOPY;  Service: General;  Laterality: N/A;  ULCERATIVE COLITIS       Family History   Problem Relation Age of Onset    Prostate cancer Paternal Grandfather     Stroke Other     Heart disease Other     Kidney disease Other     Diabetes Other     Rectal cancer Other     Colon cancer Other     Anxiety disorder Mother     Cancer Maternal Grandfather     Heart disease Maternal Grandfather     Arthritis Maternal Grandmother     Asthma Sister     Diabetes Sister        Social History     Tobacco Use    Smoking status: Never    Smokeless tobacco: Never   Vaping Use    Vaping Use: Never used   Substance Use Topics    Alcohol use: Never    Drug use: Never       Review of Systems  All systems were reviewed and negative except for:   Review of Systems   Constitutional: Negative for chills, fever and unexpected weight loss.   HENT: Negative for congestion, nosebleeds and voice change.    Eyes: Negative for blurred vision, double vision and discharge.   Respiratory: Negative for apnea, chest tightness and shortness of breath.    Cardiovascular: Negative for chest pain and leg swelling.   Gastrointestinal:        See HPI   Endocrine: Negative for cold intolerance and heat intolerance.   Genitourinary: Negative for dysuria, hematuria and urgency.   Musculoskeletal: Negative for back pain, joint swelling and neck pain.   Skin: Negative for color change and dry skin.   Neurological: Negative for dizziness and confusion.   Hematological: Negative for adenopathy.   Psychiatric/Behavioral: Negative for agitation and behavioral problems.     MEDS:  Prior to Admission medications    Medication Sig Start  Date End Date Taking? Authorizing Provider   Calcium Carb-Cholecalciferol (Oyster Shell Calcium w/D) 500-5 MG-MCG tablet TAKE 1 TABLET BY MOUTH TWICE DAILY 12/8/22  Yes Demi Fernandez MD   cyclobenzaprine (FLEXERIL) 5 MG tablet TAKE 1 TABLET TWICE A DAY  AS NEEDED FOR MUSCLE SPASMS 5/29/23  Yes Demi Fernandez MD   FeroSul 325 (65 Fe) MG tablet TAKE 1 TABLET BY MOUTH TWICE DAILY 12/8/22  Yes Demi Fernandez MD   hydrOXYzine (ATARAX) 25 MG tablet Take 1 tablet by mouth 2 (Two) Times a Day. 3/9/23  Yes Demi Fernandez MD   Incontinence Supply Disposable (Comfort Protect Adult Diaper/M) misc 1 each 5 (Five) Times a Day As Needed (for incontinence). 9/13/22  Yes Adrian Jessica APRN   Lactobacillus Acid-Pectin (Acidophilus/Pectin) capsule Take 1 capsule by mouth 2 (Two) Times a Day. 10/27/22  Yes Adrian Jessica APRN   loperamide (IMODIUM) 2 MG capsule Take 1 capsule by mouth 2 (Two) Times a Day. 3/9/23  Yes Demi Fernandez MD   metroNIDAZOLE (FLAGYL) 250 MG tablet TAKE 1 TABLET EVERY MORNING 5/11/23  Yes Demi Fernandez MD   NON FORMULARY Pain medicated lotion with Gabapentin. Patient uses for Chronic pain/Arthritis   Yes Obdulia Alvarez MD   sertraline (ZOLOFT) 50 MG tablet Take 1 tablet by mouth Daily. 3/9/23  Yes Demi Fernandez MD   Synthroid 100 MCG tablet TAKE 1 TABLET DAILY 5/29/23  Yes Demi Fernandez MD   ursodiol (ACTIGALL) 300 MG capsule Take 2 capsules by mouth 2 (Two) Times a Day. 3/9/23  Yes Demi Fernandez MD   Adalimumab (HUMIRA) 40 MG/0.4ML Pen-injector Kit Inject 40 mg under the skin into the appropriate area as directed Every 14 (Fourteen) Days.  Patient not taking: Reported on 5/18/2023 8/30/22   ProviderObdulia MD        Allergies:  Gluten meal and Latex    Objective     Vital Signs        Physical Exam:     General Appearance:    Alert, cooperative, in no acute distress   Head:    Normocephalic, without obvious abnormality, atraumatic   Eyes:           "Conjunctivae and sclerae normal, no icterus,     Ears:    Ears appear intact with no abnormalities noted   Throat:   No oral lesions, no thrush, oral mucosa moist   Neck:   No adenopathy, supple, trachea midline, no thyromegaly   Back:     No kyphosis present, no scoliosis present, no skin lesions,      erythema or scars, no tenderness to percussion or                   palpation,   range of motion normal   Lungs:     Clear to auscultation,respirations regular, even and                  unlabored    Heart:    Regular rhythm and normal rate, normal S1 and S2, no            murmur, no gallop, no rub, no click   Chest Wall:    No abnormalities observed   Abdomen:     Normal bowel sounds, no masses, no organomegaly, soft        non-tender, non-distended, no guarding, no rebound                tenderness   Rectal:        Extremities:   Moves all extremities well, no edema, no cyanosis, no             redness   Pulses:   Pulses palpable and equal bilaterally   Skin:   No bleeding, bruising or rash   Lymph nodes:   No palpable adenopathy   Neurologic:   A/o x 4 with no deficits       Results Review:   {Results Review:25408::\"I reviewed the patient's new clinical results.\"    LABS/IMAGING:  Results for orders placed or performed during the hospital encounter of 06/02/23   Pregnancy, Urine - Urine, Clean Catch    Specimen: Urine, Clean Catch   Result Value Ref Range    HCG, Urine QL Negative Negative   Tissue Pathology Exam    Specimen: A: Small Intestine; Tissue    B: Small Intestine; Tissue   Result Value Ref Range    Case Report       Surgical Pathology Report                         Case: [id number]                                  Authorizing Provider:  Jim Villatoro MD  Collected:           06/02/2023 03:10 PM          Ordering Location:     [facility] Received:            06/05/2023 09:32 AM                                 SUITES                                                                  " "     Pathologist:           Andressa Adam MD                                                     Specimens:   1) - Small Intestine, ILEUM BIOPSIES                                                                2) - Small Intestine, SMALL BOWEL POUCH BIOPSIES                                           Clinical Information       Inflammation of ileoanal pouch  History of ulcerative colitis      Final Diagnosis       1.  Ileum, biopsy:   -No significant pathologic change   -No activity   -No granulomas   -No dysplasia    2.  Small bowel pouch, biopsy:   -Nonspecific chronic inflammation   -No activity   -No granulomas   -No dysplasia          Gross Description       1. Small Intestine.  Received in formalin and labeled \" ileum\" are two fragments of tan soft tissue measuring 0.4-0.5 cm in greatest dimension. The specimen is entirely submitted in one cassette.    2. Small Intestine.  Received in formalin and labeled \" small bowel pouch\" is a 0.7 cm aggregate of tan soft tissue fragments. The specimen is entirely submitted in one cassette.   SALMA      Microscopic Description       Microscopic examination performed.          Result Review :     Assessment & Plan     History of ulcerative colitis status post total proctocolectomy with J-pouch performed at [facility].  Since that time she has developed perirectal abscess with seton drain placement July 2021 by Dr. Gardner.  She is on chronic Flagyl for pouchitis.  She has a history of sclerosing cholangitis.  She is thought possibly to have Crohn's disease.     Status post flexible pouchoscopy 6/2/2023 with left anterior transsphincteric fistula noted with noncutting seton drain in place.  No evidence of infection.  No inflammation of the distal ileum.  Minimal inflammation of the pouch.  No stricture.  Biopsies of the ileum and small bowel pouch with no activity, no granulomas, no dysplasia    Discussion with patient and family.  I recommended surgery for " the fistula.  I recommend a exam under anesthesia with likely ligation interest enteric fistula tract.  I explained the procedure and recovery.  Benefits and alternatives discussed.  Risk procedure including risk of anesthesia, bleeding, infection, recurrence, pain, heart attack, stroke, blood clot, pneumonia discussed.  All questions answered.  They agree with the plan.  Orders placed.  No enema.  I will see if GI can move up her appointment sooner.    Patient also has a history of rheumatoid arthritis.  She wishes to set up local care for that.  Referral made to rheumatology.              This document has been electronically signed by Jim Villatoro MD  June 19, 2023 07:52 EDT

## 2023-08-01 RX ORDER — METRONIDAZOLE 250 MG/1
TABLET ORAL
Qty: 30 TABLET | Refills: 0 | Status: SHIPPED | OUTPATIENT
Start: 2023-08-01

## 2023-08-03 ENCOUNTER — OFFICE VISIT (OUTPATIENT)
Dept: SURGERY | Facility: CLINIC | Age: 28
End: 2023-08-03
Payer: MEDICARE

## 2023-08-03 VITALS — HEIGHT: 62 IN | WEIGHT: 121.6 LBS | BODY MASS INDEX: 22.38 KG/M2 | RESPIRATION RATE: 14 BRPM

## 2023-08-03 DIAGNOSIS — K60.3 ANAL FISTULA: Primary | ICD-10-CM

## 2023-08-03 PROCEDURE — 1160F RVW MEDS BY RX/DR IN RCRD: CPT | Performed by: SURGERY

## 2023-08-03 PROCEDURE — 99024 POSTOP FOLLOW-UP VISIT: CPT | Performed by: SURGERY

## 2023-08-03 PROCEDURE — 1159F MED LIST DOCD IN RCRD: CPT | Performed by: SURGERY

## 2023-08-03 NOTE — LETTER
August 9, 2023       No Recipients    Patient: Claudia Wilkins   YOB: 1995   Date of Visit: 8/3/2023     Dear Demi Fernandez MD:       Thank you for referring Claudia Wilkins to me for evaluation. Below are the relevant portions of my assessment and plan of care.    If you have questions, please do not hesitate to call me. I look forward to following Claudia along with you.         Sincerely,        Jim Villatoro MD        CC:   No Recipients    Jim Villatoro MD  08/09/23 1441  Sign when Signing Visit  General Surgery/Colorectal Surgery Note    Patient Name:  Claudia Wilkins  YOB: 1995  [id number]    Referring Provider: No ref. provider found      Patient Care Team:  Demi Fernandez MD as PCP - General (Internal Medicine)    Chief complaint follow-up    Subjective.     History of present illness:    History of ulcerative colitis status post total proctocolectomy with J-pouch performed at [facility].  Since that time she has developed perirectal abscess with seton drain placement July 2021 by Dr. Gardner.  She is on chronic Flagyl for pouchitis.  She has a history of sclerosing cholangitis.  She is thought possibly to have Crohn's disease.     Status post flexible pouchoscopy 6/2/2023 with left anterior transsphincteric fistula noted with noncutting seton drain in place.  No evidence of infection.  No inflammation of the distal ileum.  Minimal inflammation of the pouch.  No stricture.  Biopsies of the ileum and small bowel pouch with no activity, no granulomas, no dysplasia    Status post exam under anesthesia with ligation of intersphincteric fistula tract 6/30/2023.  Pathology with benign anal mucosa with fibrosis and inflammation    She comes in for follow-up.  No fecal incontinence.  No fever.  Her pain is improving.  She is using probiotics.      History:  Past Medical History:   Diagnosis Date    Anemia     Anxiety     AR (allergic rhinitis)      Arthritis     Celiac disease     CFS (chronic fatigue syndrome)     Chronic liver disease     Chronic pain     Crohn's disease     Depression     Fibromyalgia     Fibromyalgia, primary     Gastric ulcer     H/O psychiatric care     Hemorrhoids     Hypermobility syndrome     Hypothyroidism     IBS (irritable bowel syndrome)     Insomnia     Lactose intolerance     Moderate episode of recurrent major depressive disorder 01/29/2018    CONTINUE CURRENT MEDS     Nasal crusting     Osteopenia     Pancreatitis     Panic attacks     Pouchitis     Primary sclerosing cholangitis     PTSD (post-traumatic stress disorder)     RA (rheumatoid arthritis)     Recurrent epistaxis     Scoliosis     Ulcerative colitis        Past Surgical History:   Procedure Laterality Date    ANAL FISSURECTOMY/FISTULECTOMY N/A 6/30/2023    Procedure: ANAL FISTULA REPAIR ligation intersphincteric tract;  Surgeon: Jim Villatoro MD;  [facility] [facility] [place] [facility] [place]  Service: General;  Laterality: N/A;    COLECTOMY TOTAL  2006    COLECTOMY W/ ILEOANAI J POUCH & SMALL BOWEL PULL THROUGH     COLON SURGERY      COLONOSCOPY      ENDOSCOPY      INCISION AND DRAINAGE PERIRECTAL ABSCESS      SIGMOIDOSCOPY N/A 6/2/2023    Procedure: POUCHOSCOPY WITH BIOPSIES;  Surgeon: Jim Villatoro MD;  Location: [facility] ENDOSCOPY;  Service: General;  Laterality: N/A;  ULCERATIVE COLITIS       Family History   Problem Relation Age of Onset    Prostate cancer Paternal Grandfather     Stroke Other     Heart disease Other     Kidney disease Other     Diabetes Other     Rectal cancer Other     Colon cancer Other     Anxiety disorder Mother     Cancer Maternal Grandfather     Heart disease Maternal Grandfather     Arthritis Maternal Grandmother     Asthma Sister     Diabetes Sister        Social History     Tobacco Use    Smoking status: Never    Smokeless tobacco: Never   Vaping Use    Vaping Use: Never used    Substance Use Topics    Alcohol use: Never    Drug use: Never       Review of Systems  All systems were reviewed and negative except for:   Review of Systems   Constitutional: Negative for chills, fever and unexpected weight loss.   HENT: Negative for congestion, nosebleeds and voice change.    Eyes: Negative for blurred vision, double vision and discharge.   Respiratory: Negative for apnea, chest tightness and shortness of breath.    Cardiovascular: Negative for chest pain and leg swelling.   Gastrointestinal:        See HPI   Endocrine: Negative for cold intolerance and heat intolerance.   Genitourinary: Negative for dysuria, hematuria and urgency.   Musculoskeletal: Negative for back pain, joint swelling and neck pain.   Skin: Negative for color change and dry skin.   Neurological: Negative for dizziness and confusion.   Hematological: Negative for adenopathy.   Psychiatric/Behavioral: Negative for agitation and behavioral problems.     MEDS:  Prior to Admission medications    Medication Sig Start Date End Date Taking? Authorizing Provider   Calcium Carb-Cholecalciferol (Oyster Shell Calcium w/D) 500-5 MG-MCG tablet TAKE 1 TABLET BY MOUTH TWICE DAILY 12/8/22  Yes Demi Fernandez MD   cyclobenzaprine (FLEXERIL) 5 MG tablet TAKE 1 TABLET TWICE A DAY  AS NEEDED FOR MUSCLE SPASMS 5/29/23  Yes Demi Fernandez MD   FeroSul 325 (65 Fe) MG tablet TAKE 1 TABLET BY MOUTH TWICE DAILY 12/8/22  Yes Demi Fernandez MD   hydrOXYzine (ATARAX) 25 MG tablet Take 1 tablet by mouth 2 (Two) Times a Day. 3/9/23  Yes Demi Fernandez MD   Incontinence Supply Disposable (Comfort Protect Adult Diaper/M) misc 1 each 5 (Five) Times a Day As Needed (for incontinence). 9/13/22  Yes Adrian Jessica APRN   Lactobacillus Acid-Pectin (Acidophilus/Pectin) capsule Take 1 capsule by mouth 2 (Two) Times a Day. 10/27/22  Yes Adrian Jessica APRN   loperamide (IMODIUM) 2 MG capsule Take 1 capsule by mouth 2 (Two) Times a Day. 3/9/23   "Yes Demi Fernandez MD   metroNIDAZOLE (FLAGYL) 250 MG tablet TAKE 1 TABLET BY MOUTH EVERY MORNING 8/1/23  Yes Demi Fernandez MD   NON FORMULARY Pain medicated lotion with Gabapentin. Patient uses for Chronic pain/Arthritis   Yes Obdulia Alvarez MD   sertraline (ZOLOFT) 50 MG tablet Take 1 tablet by mouth Daily. 3/9/23  Yes Demi Fernandez MD   Synthroid 100 MCG tablet TAKE 1 TABLET DAILY 5/29/23  Yes Demi Fernandez MD   ursodiol (ACTIGALL) 300 MG capsule Take 2 capsules by mouth 2 (Two) Times a Day. 3/9/23  Yes Demi Fernandez MD   Adalimumab (HUMIRA) 40 MG/0.4ML Pen-injector Kit Inject 40 mg under the skin into the appropriate area as directed Every 14 (Fourteen) Days.  Patient not taking: Reported on 5/18/2023 8/30/22   ProviderObdulia MD   HYDROcodone-acetaminophen (Norco) 5-325 MG per tablet Take 1 tablet by mouth Every 6 (Six) Hours As Needed for Mild Pain.  Patient not taking: Reported on 8/3/2023 6/30/23   Jim Villatoro MD        Allergies:  Gluten meal and Latex    Objective    Vital Signs        Physical Exam: Granulation tissue noted at external os, no evidence of infection, no evidence of recurrence        Results Review:   {Results Review:16027::\"I reviewed the patient's new clinical results.\"    LABS/IMAGING:  Results for orders placed or performed during the hospital encounter of 06/30/23   Pregnancy, Urine - Urine, Clean Catch    Specimen: Urine, Clean Catch   Result Value Ref Range    HCG, Urine QL Negative Negative   Tissue Pathology Exam    Specimen: Anus; Tissue   Result Value Ref Range    Case Report       Surgical Pathology Report                         Case: [id number]                                  Authorizing Provider:  Jim Villatoro MD  Collected:           06/30/2023 11:26 AM          Ordering Location:     [facility]  Received:            06/30/2023 12:45 PM                                 OR                             " "                                              Pathologist:           Flor Gonzalez DO                                                       Specimen:    Anus, Anal fistula tract                                                                   Clinical Information       Anal fistula      Final Diagnosis       Anal fistula tract, excision:   - Benign anal mucosa with fibrosis and acute and chronic inflammation      Gross Description       1. Anus.  Received in formalin labeled \"anal fistula tract\" are 2 tan-pink irregular and fibrous soft tissue fragments ranging in size from 0.8 x 0.3 x 0.2 cm to 1.0 x 0.5 x 0.4 cm, which are submitted in toto as 1A.   GJG      Microscopic Description       Microscopic examination performed.          Result Review:     Assessment & Plan    History of ulcerative colitis status post total proctocolectomy with J-pouch performed at [facility].  Since that time she has developed perirectal abscess with seton drain placement July 2021 by Dr. Gardner.     history of sclerosing cholangitis.      Status post flexible pouchoscopy 6/2/2023 with left anterior transsphincteric fistula noted with noncutting seton drain in place.  No evidence of infection.  No inflammation of the distal ileum.  Minimal inflammation of the pouch.  No stricture.  Biopsies of the ileum and small bowel pouch with no activity, no granulomas, no dysplasia    Status post exam under anesthesia with ligation of intersphincteric fistula tract 6/30/2023.  Pathology with benign anal mucosa with fibrosis and inflammation    Discussion with patient and family.  I reviewed the details of the surgery with the patient.  I reviewed the pathology.  She has follow-up with GI with her history of sclerosing cholangitis and help with management for pouchitis.  Activity as tolerated.  Diet as tolerated.  Follow-up with me in 1 month.  Possible silver nitrate application to external os if granulation tissue not " resolved.  All questions answered.  They agree with the plan.  Thank you for the consult.              This document has been electronically signed by Jim Villatoro MD  August 9, 2023 14:38 EDT

## 2023-08-09 NOTE — PROGRESS NOTES
General Surgery/Colorectal Surgery Note    Patient Name:  Claudia Wilkins  YOB: 1995  [id number]    Referring Provider: No ref. provider found      Patient Care Team:  Demi Fernandez MD as PCP - General (Internal Medicine)    Chief complaint follow-up    Subjective .     History of present illness:    History of ulcerative colitis status post total proctocolectomy with J-pouch performed at [facility].  Since that time she has developed perirectal abscess with seton drain placement July 2021 by Dr. Gardner.  She is on chronic Flagyl for pouchitis.  She has a history of sclerosing cholangitis.  She is thought possibly to have Crohn's disease.     Status post flexible pouchoscopy 6/2/2023 with left anterior transsphincteric fistula noted with noncutting seton drain in place.  No evidence of infection.  No inflammation of the distal ileum.  Minimal inflammation of the pouch.  No stricture.  Biopsies of the ileum and small bowel pouch with no activity, no granulomas, no dysplasia    Status post exam under anesthesia with ligation of intersphincteric fistula tract 6/30/2023.  Pathology with benign anal mucosa with fibrosis and inflammation    She comes in for follow-up.  No fecal incontinence.  No fever.  Her pain is improving.  She is using probiotics.      History:  Past Medical History:   Diagnosis Date    Anemia     Anxiety     AR (allergic rhinitis)     Arthritis     Celiac disease     CFS (chronic fatigue syndrome)     Chronic liver disease     Chronic pain     Crohn's disease     Depression     Fibromyalgia     Fibromyalgia, primary     Gastric ulcer     H/O psychiatric care     Hemorrhoids     Hypermobility syndrome     Hypothyroidism     IBS (irritable bowel syndrome)     Insomnia     Lactose intolerance     Moderate episode of recurrent major depressive disorder 01/29/2018    CONTINUE CURRENT MEDS     Nasal crusting     Osteopenia     Pancreatitis     Panic attacks     Pouchitis      Primary sclerosing cholangitis     PTSD (post-traumatic stress disorder)     RA (rheumatoid arthritis)     Recurrent epistaxis     Scoliosis     Ulcerative colitis        Past Surgical History:   Procedure Laterality Date    ANAL FISSURECTOMY/FISTULECTOMY N/A 6/30/2023    Procedure: ANAL FISTULA REPAIR ligation intersphincteric tract;  Surgeon: Jim Villatoro MD;  [place] [facility] [place] [facility] [place]  Service: General;  Laterality: N/A;    COLECTOMY TOTAL  2006    COLECTOMY W/ ILEOANAI J POUCH & SMALL BOWEL PULL THROUGH     COLON SURGERY      COLONOSCOPY      ENDOSCOPY      INCISION AND DRAINAGE PERIRECTAL ABSCESS      SIGMOIDOSCOPY N/A 6/2/2023    Procedure: POUCHOSCOPY WITH BIOPSIES;  Surgeon: Jim Villatoro MD;  Location: [facility] ENDOSCOPY;  Service: General;  Laterality: N/A;  ULCERATIVE COLITIS       Family History   Problem Relation Age of Onset    Prostate cancer Paternal Grandfather     Stroke Other     Heart disease Other     Kidney disease Other     Diabetes Other     Rectal cancer Other     Colon cancer Other     Anxiety disorder Mother     Cancer Maternal Grandfather     Heart disease Maternal Grandfather     Arthritis Maternal Grandmother     Asthma Sister     Diabetes Sister        Social History     Tobacco Use    Smoking status: Never    Smokeless tobacco: Never   Vaping Use    Vaping Use: Never used   Substance Use Topics    Alcohol use: Never    Drug use: Never       Review of Systems  All systems were reviewed and negative except for:   Review of Systems   Constitutional: Negative for chills, fever and unexpected weight loss.   HENT: Negative for congestion, nosebleeds and voice change.    Eyes: Negative for blurred vision, double vision and discharge.   Respiratory: Negative for apnea, chest tightness and shortness of breath.    Cardiovascular: Negative for chest pain and leg swelling.   Gastrointestinal:        See HPI   Endocrine: Negative for cold intolerance and heat intolerance.    Genitourinary: Negative for dysuria, hematuria and urgency.   Musculoskeletal: Negative for back pain, joint swelling and neck pain.   Skin: Negative for color change and dry skin.   Neurological: Negative for dizziness and confusion.   Hematological: Negative for adenopathy.   Psychiatric/Behavioral: Negative for agitation and behavioral problems.     MEDS:  Prior to Admission medications    Medication Sig Start Date End Date Taking? Authorizing Provider   Calcium Carb-Cholecalciferol (Oyster Shell Calcium w/D) 500-5 MG-MCG tablet TAKE 1 TABLET BY MOUTH TWICE DAILY 12/8/22  Yes Demi Fernandez MD   cyclobenzaprine (FLEXERIL) 5 MG tablet TAKE 1 TABLET TWICE A DAY  AS NEEDED FOR MUSCLE SPASMS 5/29/23  Yes Demi Fernandez MD   FeroSul 325 (65 Fe) MG tablet TAKE 1 TABLET BY MOUTH TWICE DAILY 12/8/22  Yes Demi Fernandez MD   hydrOXYzine (ATARAX) 25 MG tablet Take 1 tablet by mouth 2 (Two) Times a Day. 3/9/23  Yes Demi Fernandez MD   Incontinence Supply Disposable (Comfort Protect Adult Diaper/M) misc 1 each 5 (Five) Times a Day As Needed (for incontinence). 9/13/22  Yes Adrian Jessica APRN   Lactobacillus Acid-Pectin (Acidophilus/Pectin) capsule Take 1 capsule by mouth 2 (Two) Times a Day. 10/27/22  Yes Adrian Jessica APRN   loperamide (IMODIUM) 2 MG capsule Take 1 capsule by mouth 2 (Two) Times a Day. 3/9/23  Yes Demi Fernandez MD   metroNIDAZOLE (FLAGYL) 250 MG tablet TAKE 1 TABLET BY MOUTH EVERY MORNING 8/1/23  Yes Demi Fernandez MD   NON FORMULARY Pain medicated lotion with Gabapentin. Patient uses for Chronic pain/Arthritis   Yes Obdulia Alvarez MD   sertraline (ZOLOFT) 50 MG tablet Take 1 tablet by mouth Daily. 3/9/23  Yes Demi Fernandez MD   Synthroid 100 MCG tablet TAKE 1 TABLET DAILY 5/29/23  Yes Demi Fernandez MD   ursodiol (ACTIGALL) 300 MG capsule Take 2 capsules by mouth 2 (Two) Times a Day. 3/9/23  Yes Demi Fernandez MD   Adalimumab (HUMIRA) 40 MG/0.4ML  "Pen-injector Kit Inject 40 mg under the skin into the appropriate area as directed Every 14 (Fourteen) Days.  Patient not taking: Reported on 5/18/2023 8/30/22   Provider, MD Obdulia   HYDROcodone-acetaminophen (Norco) 5-325 MG per tablet Take 1 tablet by mouth Every 6 (Six) Hours As Needed for Mild Pain.  Patient not taking: Reported on 8/3/2023 6/30/23   Jim Villatoro MD        Allergies:  Gluten meal and Latex    Objective     Vital Signs        Physical Exam: Granulation tissue noted at external os, no evidence of infection, no evidence of recurrence        Results Review:   {Results Review:29792::\"I reviewed the patient's new clinical results.\"    LABS/IMAGING:  Results for orders placed or performed during the hospital encounter of 06/30/23   Pregnancy, Urine - Urine, Clean Catch    Specimen: Urine, Clean Catch   Result Value Ref Range    HCG, Urine QL Negative Negative   Tissue Pathology Exam    Specimen: Anus; Tissue   Result Value Ref Range    Case Report       Surgical Pathology Report                         Case: [id number]                                  Authorizing Provider:  Jim Villatoro MD  Collected:           06/30/2023 11:26 AM          Ordering Location:     [facility]  Received:            06/30/2023 12:45 PM                                 OR                                                                           Pathologist:           Flor Gonzalez DO                                                       Specimen:    Anus, Anal fistula tract                                                                   Clinical Information       Anal fistula      Final Diagnosis       Anal fistula tract, excision:   - Benign anal mucosa with fibrosis and acute and chronic inflammation      Gross Description       1. Anus.  Received in formalin labeled \"anal fistula tract\" are 2 tan-pink irregular and fibrous soft tissue fragments ranging in size from 0.8 x 0.3 " x 0.2 cm to 1.0 x 0.5 x 0.4 cm, which are submitted in toto as 1A.   GJG      Microscopic Description       Microscopic examination performed.          Result Review :     Assessment & Plan     History of ulcerative colitis status post total proctocolectomy with J-pouch performed at [facility].  Since that time she has developed perirectal abscess with seton drain placement July 2021 by Dr. Gardner.     history of sclerosing cholangitis.      Status post flexible pouchoscopy 6/2/2023 with left anterior transsphincteric fistula noted with noncutting seton drain in place.  No evidence of infection.  No inflammation of the distal ileum.  Minimal inflammation of the pouch.  No stricture.  Biopsies of the ileum and small bowel pouch with no activity, no granulomas, no dysplasia    Status post exam under anesthesia with ligation of intersphincteric fistula tract 6/30/2023.  Pathology with benign anal mucosa with fibrosis and inflammation    Discussion with patient and family.  I reviewed the details of the surgery with the patient.  I reviewed the pathology.  She has follow-up with GI with her history of sclerosing cholangitis and help with management for pouchitis.  Activity as tolerated.  Diet as tolerated.  Follow-up with me in 1 month.  Possible silver nitrate application to external os if granulation tissue not resolved.  All questions answered.  They agree with the plan.  Thank you for the consult.              This document has been electronically signed by Jim Villatoro MD  August 9, 2023 14:38 EDT

## 2023-08-30 NOTE — TELEPHONE ENCOUNTER
Protocol failed, ok to fill synthroid?       Thyroid Hormones Protocol Vncakn0008/24/2023 02:21 AM   Protocol Details Normal TSH in past 12 months

## 2023-08-31 RX ORDER — CYCLOBENZAPRINE HCL 5 MG
TABLET ORAL
Qty: 180 TABLET | Refills: 0 | Status: SHIPPED | OUTPATIENT
Start: 2023-08-31

## 2023-08-31 RX ORDER — HYDROXYZINE HYDROCHLORIDE 25 MG/1
TABLET, FILM COATED ORAL
Qty: 180 TABLET | Refills: 1 | Status: SHIPPED | OUTPATIENT
Start: 2023-08-31

## 2023-08-31 RX ORDER — LEVOTHYROXINE SODIUM 100 MCG
TABLET ORAL
Qty: 90 TABLET | Refills: 0 | Status: SHIPPED | OUTPATIENT
Start: 2023-08-31

## 2023-08-31 RX ORDER — URSODIOL 300 MG/1
CAPSULE ORAL
Qty: 360 CAPSULE | Refills: 1 | Status: SHIPPED | OUTPATIENT
Start: 2023-08-31

## 2023-09-22 RX ORDER — METRONIDAZOLE 250 MG/1
TABLET ORAL
Qty: 30 TABLET | Refills: 0 | OUTPATIENT
Start: 2023-09-22

## 2023-10-05 RX ORDER — METRONIDAZOLE 250 MG/1
250 TABLET ORAL EVERY MORNING
Qty: 90 TABLET | Refills: 0 | Status: SHIPPED | OUTPATIENT
Start: 2023-10-05

## 2023-11-16 ENCOUNTER — OFFICE VISIT (OUTPATIENT)
Dept: SURGERY | Facility: CLINIC | Age: 28
End: 2023-11-16
Payer: MEDICARE

## 2023-11-16 VITALS
SYSTOLIC BLOOD PRESSURE: 112 MMHG | BODY MASS INDEX: 22.05 KG/M2 | DIASTOLIC BLOOD PRESSURE: 72 MMHG | HEIGHT: 62 IN | WEIGHT: 119.8 LBS

## 2023-11-16 DIAGNOSIS — K50.913 CROHN'S DISEASE WITH FISTULA, UNSPECIFIED GASTROINTESTINAL TRACT LOCATION: Primary | ICD-10-CM

## 2023-11-16 PROCEDURE — 99212 OFFICE O/P EST SF 10 MIN: CPT | Performed by: SURGERY

## 2023-11-16 PROCEDURE — 1160F RVW MEDS BY RX/DR IN RCRD: CPT | Performed by: SURGERY

## 2023-11-16 PROCEDURE — 1159F MED LIST DOCD IN RCRD: CPT | Performed by: SURGERY

## 2023-11-16 NOTE — LETTER
November 22, 2023       No Recipients    Patient: Claudia Wilkins   YOB: 1995   Date of Visit: 11/16/2023     Dear eDmi Fernandez MD:       Thank you for referring Claudia Wilkins to me for evaluation. Below are the relevant portions of my assessment and plan of care.    If you have questions, please do not hesitate to call me. I look forward to following Claudia along with you.         Sincerely,        Jim Villatoro MD        CC:   No Recipients    Jim Villatoro MD  11/22/23 1218  Sign when Signing Visit  General Surgery/Colorectal Surgery Note    Patient Name:  Claudia Wilkins  YOB: 1995  [id number]    Referring Provider: No ref. provider found      Patient Care Team:  Demi Fernandez MD as PCP - General (Internal Medicine)    Chief complaint follow-up    Subjective.     History of present illness:    History of ulcerative colitis status post total proctocolectomy with J-pouch performed at [facility].  Since that time she has developed perirectal abscess with seton drain placement July 2021 by Dr. Gardner.  She is on chronic Flagyl for pouchitis.  She has a history of sclerosing cholangitis.  She is thought possibly to have Crohn's disease.     Status post flexible pouchoscopy 6/2/2023 with left anterior transsphincteric fistula noted with noncutting seton drain in place.  No evidence of infection.  No inflammation of the distal ileum.  Minimal inflammation of the pouch.  No stricture.  Biopsies of the ileum and small bowel pouch with no activity, no granulomas, no dysplasia    Status post exam under anesthesia with ligation of intersphincteric fistula tract 6/30/2023.  Pathology with benign anal mucosa with fibrosis and inflammation    Comes in for follow-up for some yellow-greenish drainage from the external opening of her previous fistulotomy.  Some soreness with cleaning.  No fever.  She has been unable to get into GI since last  seen.      History:  Past Medical History:   Diagnosis Date   • Anemia    • Anxiety    • AR (allergic rhinitis)    • Arthritis    • Celiac disease    • CFS (chronic fatigue syndrome)    • Chronic liver disease    • Chronic pain    • Crohn's disease    • Depression    • Fibromyalgia    • Fibromyalgia, primary    • Gastric ulcer    • H/O psychiatric care    • Hemorrhoids    • Hypermobility syndrome    • Hypothyroidism    • IBS (irritable bowel syndrome)    • Insomnia    • Lactose intolerance    • Moderate episode of recurrent major depressive disorder 01/29/2018    CONTINUE CURRENT MEDS    • Nasal crusting    • Osteopenia    • Pancreatitis    • Panic attacks    • Pouchitis    • Primary sclerosing cholangitis    • PTSD (post-traumatic stress disorder)    • RA (rheumatoid arthritis)    • Recurrent epistaxis    • Scoliosis    • Ulcerative colitis        Past Surgical History:   Procedure Laterality Date   • ANAL FISSURECTOMY/FISTULECTOMY N/A 6/30/2023    Procedure: ANAL FISTULA REPAIR ligation intersphincteric tract;  Surgeon: Jim Villatoro MD;  Location: [facility] OR [facility];  Service: General;  Laterality: N/A;   • COLECTOMY TOTAL  2006    COLECTOMY W/ ILEOANAI J POUCH & SMALL BOWEL PULL THROUGH    • COLON SURGERY     • COLONOSCOPY     • ENDOSCOPY     • INCISION AND DRAINAGE PERIRECTAL ABSCESS     • SIGMOIDOSCOPY N/A 6/2/2023    Procedure: POUCHOSCOPY WITH BIOPSIES;  Surgeon: Jim Villatoro MD;  Location: [facility] ENDOSCOPY;  Service: General;  Laterality: N/A;  ULCERATIVE COLITIS       Family History   Problem Relation Age of Onset   • Prostate cancer Paternal Grandfather    • Stroke Other    • Heart disease Other    • Kidney disease Other    • Diabetes Other    • Rectal cancer Other    • Colon cancer Other    • Anxiety disorder Mother    • Cancer Maternal Grandfather    • Heart disease Maternal Grandfather    • Arthritis Maternal Grandmother    • Asthma Sister    • Diabetes Sister        Social History      Tobacco Use   • Smoking status: Never   • Smokeless tobacco: Never   Vaping Use   • Vaping Use: Never used   Substance Use Topics   • Alcohol use: Never   • Drug use: Never       Review of Systems  All systems were reviewed and negative except for:   Review of Systems   Constitutional: Negative for chills, fever and unexpected weight loss.   HENT: Negative for congestion, nosebleeds and voice change.    Eyes: Negative for blurred vision, double vision and discharge.   Respiratory: Negative for apnea, chest tightness and shortness of breath.    Cardiovascular: Negative for chest pain and leg swelling.   Gastrointestinal:        See HPI   Endocrine: Negative for cold intolerance and heat intolerance.   Genitourinary: Negative for dysuria, hematuria and urgency.   Musculoskeletal: Negative for back pain, joint swelling and neck pain.   Skin: Negative for color change and dry skin.   Neurological: Negative for dizziness and confusion.   Hematological: Negative for adenopathy.   Psychiatric/Behavioral: Negative for agitation and behavioral problems.     MEDS:  Prior to Admission medications    Medication Sig Start Date End Date Taking? Authorizing Provider   Calcium Carb-Cholecalciferol (Oyster Shell Calcium w/D) 500-5 MG-MCG tablet TAKE 1 TABLET BY MOUTH TWICE DAILY 12/8/22  Yes Demi Fernandez MD   FeroSul 325 (65 Fe) MG tablet TAKE 1 TABLET BY MOUTH TWICE DAILY 12/8/22  Yes Demi Fernandez MD   hydrOXYzine (ATARAX) 25 MG tablet TAKE 1 TABLET TWICE A DAY 8/31/23  Yes Demi Fernandez MD   Incontinence Supply Disposable (Comfort Protect Adult Diaper/M) misc 1 each 5 (Five) Times a Day As Needed (for incontinence). 9/13/22  Yes Adrian Jessica APRN   Lactobacillus Acid-Pectin (Acidophilus/Pectin) capsule Take 1 capsule by mouth 2 (Two) Times a Day. 10/27/22  Yes Adrian Jessica APRN   loperamide (IMODIUM) 2 MG capsule Take 1 capsule by mouth 2 (Two) Times a Day. 3/9/23  Yes Demi Fernandez MD    metroNIDAZOLE (FLAGYL) 250 MG tablet Take 1 tablet by mouth Every Morning. 10/5/23  Yes Demi Fernandez MD   NON FORMULARY Pain medicated lotion with Gabapentin. Patient uses for Chronic pain/Arthritis   Yes Obdulia Alvarez MD   sertraline (ZOLOFT) 50 MG tablet TAKE 1 TABLET DAILY 8/31/23  Yes Demi Fernandez MD   ursodiol (ACTIGALL) 300 MG capsule TAKE 2 CAPSULES TWICE DAILY 8/31/23  Yes Demi Fernandez MD   cyclobenzaprine (FLEXERIL) 5 MG tablet TAKE 1 TABLET TWICE DAILY  AS NEEDED FOR MUSCLE SPASMS 8/31/23 11/22/23 Yes Demi Fernandez MD   Synthroid 100 MCG tablet TAKE 1 TABLET DAILY 8/31/23 11/22/23 Yes Demi Fernandez MD   Adalimumab (HUMIRA) 40 MG/0.4ML Pen-injector Kit Inject 40 mg under the skin into the appropriate area as directed Every 14 (Fourteen) Days.  Patient not taking: Reported on 5/18/2023 8/30/22   Obdulia Alvarez MD   cyclobenzaprine (FLEXERIL) 5 MG tablet TAKE 1 TABLET TWICE DAILY  AS NEEDED FOR MUSCLE SPASMS 11/22/23   Demi Fernandez MD   HYDROcodone-acetaminophen (Norco) 5-325 MG per tablet Take 1 tablet by mouth Every 6 (Six) Hours As Needed for Mild Pain.  Patient not taking: Reported on 8/3/2023 6/30/23   Jim Villatoro MD   Synthroid 100 MCG tablet TAKE 1 TABLET DAILY 11/22/23   Demi Fernandez MD        Allergies:  Gluten meal and Latex    Objective    Vital Signs        Physical Exam:     General Appearance:    Alert, cooperative, in no acute distress   Head:    Normocephalic, without obvious abnormality, atraumatic   Eyes:          Conjunctivae and sclerae normal, no icterus,     Ears:    Ears appear intact with no abnormalities noted   Throat:   No oral lesions, no thrush, oral mucosa moist   Neck:   No adenopathy, supple, trachea midline, no thyromegaly   Back:     No kyphosis present, no scoliosis present, no skin lesions,      erythema or scars, no tenderness to percussion or                   palpation,   range of motion normal  "  Lungs:     Clear to auscultation,respirations regular, even and                  unlabored    Heart:    Regular rhythm and normal rate, normal S1 and S2, no            murmur, no gallop, no rub, no click   Chest Wall:    No abnormalities observed   Abdomen:     Normal bowel sounds, no masses, no organomegaly, soft        non-tender, non-distended, no guarding, no rebound                tenderness   Rectal:   External os opening with no evidence of infection, some drainage, no abscess   Extremities:   Moves all extremities well, no edema, no cyanosis, no             redness   Pulses:   Pulses palpable and equal bilaterally   Skin:   No bleeding, bruising or rash   Lymph nodes:   No palpable adenopathy   Neurologic:   A/o x 4 with no deficits       Results Review:   {Results Review:95275::\"I reviewed the patient's new clinical results.\"    LABS/IMAGING:  Results for orders placed or performed during the hospital encounter of 06/30/23   Pregnancy, Urine - Urine, Clean Catch    Specimen: Urine, Clean Catch   Result Value Ref Range    HCG, Urine QL Negative Negative   Tissue Pathology Exam    Specimen: Anus; Tissue   Result Value Ref Range    Case Report       Surgical Pathology Report                         Case: [id number]                                  Authorizing Provider:  Jim Villatoro MD  Collected:           06/30/2023 11:26 AM          Ordering Location:     [facility]  Received:            06/30/2023 12:45 PM                                 OR                                                                           Pathologist:           Flor Gonzalez DO                                                       Specimen:    Anus, Anal fistula tract                                                                   Clinical Information       Anal fistula      Final Diagnosis       Anal fistula tract, excision:   - Benign anal mucosa with fibrosis and acute and chronic " "inflammation      Gross Description       1. Anus.  Received in formalin labeled \"anal fistula tract\" are 2 tan-pink irregular and fibrous soft tissue fragments ranging in size from 0.8 x 0.3 x 0.2 cm to 1.0 x 0.5 x 0.4 cm, which are submitted in toto as 1A.   GJG      Microscopic Description       Microscopic examination performed.          Result Review:     Assessment & Plan    History of ulcerative colitis status post total proctocolectomy with J-pouch performed at [facility].  Since that time she has developed perirectal abscess with seton drain placement July 2021 by Dr. Gardner.  She is on chronic Flagyl for pouchitis.  She has a history of sclerosing cholangitis.  She is thought possibly to have Crohn's disease.     Status post flexible pouchoscopy 6/2/2023 with left anterior transsphincteric fistula noted with noncutting seton drain in place.  No evidence of infection.  No inflammation of the distal ileum.  Minimal inflammation of the pouch.  No stricture.  Biopsies of the ileum and small bowel pouch with no activity, no granulomas, no dysplasia    Status post exam under anesthesia with ligation of intersphincteric fistula tract 6/30/2023.  Pathology with benign anal mucosa with fibrosis and inflammation    Discussion with patient and family.  I will try to help facilitate her getting back into see Dr. Agrawal.  We discussed her starting possibly a biologic to see if this improves her symptoms versus exam under anesthesia with any indicated procedures, possible superficial fistulotomy, seton drain placement.  Procedure and recovery discussed.  Benefits and alternatives discussed.  Risk procedure including risk of anesthesia, bleeding, infection, need for more surgery, pain were discussed.  All questions answered.  She agrees with the plan.  Thank you for the consult.              This document has been electronically signed by Jim Villatoro MD  November 22, 2023 12:14 EST  "

## 2023-11-21 ENCOUNTER — TELEPHONE (OUTPATIENT)
Dept: GASTROENTEROLOGY | Facility: CLINIC | Age: 28
End: 2023-11-21
Payer: MEDICARE

## 2023-11-22 ENCOUNTER — TELEPHONE (OUTPATIENT)
Dept: GASTROENTEROLOGY | Facility: CLINIC | Age: 28
End: 2023-11-22
Payer: MEDICARE

## 2023-11-22 RX ORDER — LEVOTHYROXINE SODIUM 100 MCG
TABLET ORAL
Qty: 90 TABLET | Refills: 0 | Status: SHIPPED | OUTPATIENT
Start: 2023-11-22

## 2023-11-22 RX ORDER — CYCLOBENZAPRINE HCL 5 MG
TABLET ORAL
Qty: 180 TABLET | Refills: 0 | Status: SHIPPED | OUTPATIENT
Start: 2023-11-22

## 2023-11-22 NOTE — PROGRESS NOTES
General Surgery/Colorectal Surgery Note    Patient Name:  Claudia Wilkins  YOB: 1995  [id number]    Referring Provider: No ref. provider found      Patient Care Team:  Demi Fernandez MD as PCP - General (Internal Medicine)    Chief complaint follow-up    Subjective .     History of present illness:    History of ulcerative colitis status post total proctocolectomy with J-pouch performed at [facility].  Since that time she has developed perirectal abscess with seton drain placement July 2021 by Dr. Gardner.  She is on chronic Flagyl for pouchitis.  She has a history of sclerosing cholangitis.  She is thought possibly to have Crohn's disease.     Status post flexible pouchoscopy 6/2/2023 with left anterior transsphincteric fistula noted with noncutting seton drain in place.  No evidence of infection.  No inflammation of the distal ileum.  Minimal inflammation of the pouch.  No stricture.  Biopsies of the ileum and small bowel pouch with no activity, no granulomas, no dysplasia    Status post exam under anesthesia with ligation of intersphincteric fistula tract 6/30/2023.  Pathology with benign anal mucosa with fibrosis and inflammation    Comes in for follow-up for some yellow-greenish drainage from the external opening of her previous fistulotomy.  Some soreness with cleaning.  No fever.  She has been unable to get into GI since last seen.      History:  Past Medical History:   Diagnosis Date    Anemia     Anxiety     AR (allergic rhinitis)     Arthritis     Celiac disease     CFS (chronic fatigue syndrome)     Chronic liver disease     Chronic pain     Crohn's disease     Depression     Fibromyalgia     Fibromyalgia, primary     Gastric ulcer     H/O psychiatric care     Hemorrhoids     Hypermobility syndrome     Hypothyroidism     IBS (irritable bowel syndrome)     Insomnia     Lactose intolerance     Moderate episode of recurrent major depressive disorder 01/29/2018    CONTINUE  CURRENT MEDS     Nasal crusting     Osteopenia     Pancreatitis     Panic attacks     Pouchitis     Primary sclerosing cholangitis     PTSD (post-traumatic stress disorder)     RA (rheumatoid arthritis)     Recurrent epistaxis     Scoliosis     Ulcerative colitis        Past Surgical History:   Procedure Laterality Date    ANAL FISSURECTOMY/FISTULECTOMY N/A 6/30/2023    Procedure: ANAL FISTULA REPAIR ligation intersphincteric tract;  Surgeon: Jim Villatoro MD;  [place] [facility] [place] [facility]  Service: General;  Laterality: N/A;    COLECTOMY TOTAL  2006    COLECTOMY W/ ILEOANAI J POUCH & SMALL BOWEL PULL THROUGH     COLON SURGERY      COLONOSCOPY      ENDOSCOPY      INCISION AND DRAINAGE PERIRECTAL ABSCESS      SIGMOIDOSCOPY N/A 6/2/2023    Procedure: POUCHOSCOPY WITH BIOPSIES;  Surgeon: Jim Villatoro MD;  Location: [facility] ENDOSCOPY;  Service: General;  Laterality: N/A;  ULCERATIVE COLITIS       Family History   Problem Relation Age of Onset    Prostate cancer Paternal Grandfather     Stroke Other     Heart disease Other     Kidney disease Other     Diabetes Other     Rectal cancer Other     Colon cancer Other     Anxiety disorder Mother     Cancer Maternal Grandfather     Heart disease Maternal Grandfather     Arthritis Maternal Grandmother     Asthma Sister     Diabetes Sister        Social History     Tobacco Use    Smoking status: Never    Smokeless tobacco: Never   Vaping Use    Vaping Use: Never used   Substance Use Topics    Alcohol use: Never    Drug use: Never       Review of Systems  All systems were reviewed and negative except for:   Review of Systems   Constitutional: Negative for chills, fever and unexpected weight loss.   HENT: Negative for congestion, nosebleeds and voice change.    Eyes: Negative for blurred vision, double vision and discharge.   Respiratory: Negative for apnea, chest tightness and shortness of breath.    Cardiovascular: Negative for chest pain and leg swelling.    Gastrointestinal:        See HPI   Endocrine: Negative for cold intolerance and heat intolerance.   Genitourinary: Negative for dysuria, hematuria and urgency.   Musculoskeletal: Negative for back pain, joint swelling and neck pain.   Skin: Negative for color change and dry skin.   Neurological: Negative for dizziness and confusion.   Hematological: Negative for adenopathy.   Psychiatric/Behavioral: Negative for agitation and behavioral problems.     MEDS:  Prior to Admission medications    Medication Sig Start Date End Date Taking? Authorizing Provider   Calcium Carb-Cholecalciferol (Oyster Shell Calcium w/D) 500-5 MG-MCG tablet TAKE 1 TABLET BY MOUTH TWICE DAILY 12/8/22  Yes Demi Fernandez MD   FeroSul 325 (65 Fe) MG tablet TAKE 1 TABLET BY MOUTH TWICE DAILY 12/8/22  Yes Demi Fernandez MD   hydrOXYzine (ATARAX) 25 MG tablet TAKE 1 TABLET TWICE A DAY 8/31/23  Yes Demi Fernandez MD   Incontinence Supply Disposable (Comfort Protect Adult Diaper/M) misc 1 each 5 (Five) Times a Day As Needed (for incontinence). 9/13/22  Yes Adrian Jessica APRN   Lactobacillus Acid-Pectin (Acidophilus/Pectin) capsule Take 1 capsule by mouth 2 (Two) Times a Day. 10/27/22  Yes Adrian Jessica APRN   loperamide (IMODIUM) 2 MG capsule Take 1 capsule by mouth 2 (Two) Times a Day. 3/9/23  Yes Demi Fernandez MD   metroNIDAZOLE (FLAGYL) 250 MG tablet Take 1 tablet by mouth Every Morning. 10/5/23  Yes Demi Fernandez MD   NON FORMULARY Pain medicated lotion with Gabapentin. Patient uses for Chronic pain/Arthritis   Yes Obdulia Alvarez MD   sertraline (ZOLOFT) 50 MG tablet TAKE 1 TABLET DAILY 8/31/23  Yes Demi Fernandez MD   ursodiol (ACTIGALL) 300 MG capsule TAKE 2 CAPSULES TWICE DAILY 8/31/23  Yes Demi Fernandez MD   cyclobenzaprine (FLEXERIL) 5 MG tablet TAKE 1 TABLET TWICE DAILY  AS NEEDED FOR MUSCLE SPASMS 8/31/23 11/22/23 Yes Demi Fernandez MD   Synthroid 100 MCG tablet TAKE 1 TABLET DAILY  8/31/23 11/22/23 Yes Demi Fernandez MD   Adalimumab (HUMIRA) 40 MG/0.4ML Pen-injector Kit Inject 40 mg under the skin into the appropriate area as directed Every 14 (Fourteen) Days.  Patient not taking: Reported on 5/18/2023 8/30/22   ProviderObdulia MD   cyclobenzaprine (FLEXERIL) 5 MG tablet TAKE 1 TABLET TWICE DAILY  AS NEEDED FOR MUSCLE SPASMS 11/22/23   Demi Fernandez MD   HYDROcodone-acetaminophen (Norco) 5-325 MG per tablet Take 1 tablet by mouth Every 6 (Six) Hours As Needed for Mild Pain.  Patient not taking: Reported on 8/3/2023 6/30/23   Jim Villatoro MD   Synthroid 100 MCG tablet TAKE 1 TABLET DAILY 11/22/23   Demi Fernandez MD        Allergies:  Gluten meal and Latex    Objective     Vital Signs        Physical Exam:     General Appearance:    Alert, cooperative, in no acute distress   Head:    Normocephalic, without obvious abnormality, atraumatic   Eyes:          Conjunctivae and sclerae normal, no icterus,     Ears:    Ears appear intact with no abnormalities noted   Throat:   No oral lesions, no thrush, oral mucosa moist   Neck:   No adenopathy, supple, trachea midline, no thyromegaly   Back:     No kyphosis present, no scoliosis present, no skin lesions,      erythema or scars, no tenderness to percussion or                   palpation,   range of motion normal   Lungs:     Clear to auscultation,respirations regular, even and                  unlabored    Heart:    Regular rhythm and normal rate, normal S1 and S2, no            murmur, no gallop, no rub, no click   Chest Wall:    No abnormalities observed   Abdomen:     Normal bowel sounds, no masses, no organomegaly, soft        non-tender, non-distended, no guarding, no rebound                tenderness   Rectal:   External os opening with no evidence of infection, some drainage, no abscess   Extremities:   Moves all extremities well, no edema, no cyanosis, no             redness   Pulses:   Pulses palpable and equal  "bilaterally   Skin:   No bleeding, bruising or rash   Lymph nodes:   No palpable adenopathy   Neurologic:   A/o x 4 with no deficits       Results Review:   {Results Review:12278::\"I reviewed the patient's new clinical results.\"    LABS/IMAGING:  Results for orders placed or performed during the hospital encounter of 06/30/23   Pregnancy, Urine - Urine, Clean Catch    Specimen: Urine, Clean Catch   Result Value Ref Range    HCG, Urine QL Negative Negative   Tissue Pathology Exam    Specimen: Anus; Tissue   Result Value Ref Range    Case Report       Surgical Pathology Report                         Case: [id number]                                  Authorizing Provider:  Jim Villatoro MD  Collected:           06/30/2023 11:26 AM          Ordering Location:     [facility]  Received:            06/30/2023 12:45 PM                                 OR                                                                           Pathologist:           Flor Gonzalez DO                                                       Specimen:    Anus, Anal fistula tract                                                                   Clinical Information       Anal fistula      Final Diagnosis       Anal fistula tract, excision:   - Benign anal mucosa with fibrosis and acute and chronic inflammation      Gross Description       1. Anus.  Received in formalin labeled \"anal fistula tract\" are 2 tan-pink irregular and fibrous soft tissue fragments ranging in size from 0.8 x 0.3 x 0.2 cm to 1.0 x 0.5 x 0.4 cm, which are submitted in toto as 1A.   GJG      Microscopic Description       Microscopic examination performed.          Result Review :     Assessment & Plan     History of ulcerative colitis status post total proctocolectomy with J-pouch performed at [facility].  Since that time she has developed perirectal abscess with seton drain placement July 2021 by Dr. Gardner.  She is on chronic Flagyl " for pouchitis.  She has a history of sclerosing cholangitis.  She is thought possibly to have Crohn's disease.     Status post flexible pouchoscopy 6/2/2023 with left anterior transsphincteric fistula noted with noncutting seton drain in place.  No evidence of infection.  No inflammation of the distal ileum.  Minimal inflammation of the pouch.  No stricture.  Biopsies of the ileum and small bowel pouch with no activity, no granulomas, no dysplasia    Status post exam under anesthesia with ligation of intersphincteric fistula tract 6/30/2023.  Pathology with benign anal mucosa with fibrosis and inflammation    Discussion with patient and family.  I will try to help facilitate her getting back into see Dr. Agrawal.  We discussed her starting possibly a biologic to see if this improves her symptoms versus exam under anesthesia with any indicated procedures, possible superficial fistulotomy, seton drain placement.  Procedure and recovery discussed.  Benefits and alternatives discussed.  Risk procedure including risk of anesthesia, bleeding, infection, need for more surgery, pain were discussed.  All questions answered.  She agrees with the plan.  Thank you for the consult.              This document has been electronically signed by Jim Villatoro MD  November 22, 2023 12:14 EST

## 2023-11-27 ENCOUNTER — OFFICE VISIT (OUTPATIENT)
Dept: INTERNAL MEDICINE | Facility: CLINIC | Age: 28
End: 2023-11-27
Payer: MEDICARE

## 2023-11-27 VITALS
BODY MASS INDEX: 22.08 KG/M2 | DIASTOLIC BLOOD PRESSURE: 70 MMHG | OXYGEN SATURATION: 98 % | TEMPERATURE: 98.6 F | RESPIRATION RATE: 15 BRPM | HEART RATE: 89 BPM | WEIGHT: 120 LBS | HEIGHT: 62 IN | SYSTOLIC BLOOD PRESSURE: 104 MMHG

## 2023-11-27 DIAGNOSIS — F41.9 ANXIETY: ICD-10-CM

## 2023-11-27 DIAGNOSIS — E03.8 HYPOTHYROIDISM DUE TO HASHIMOTO'S THYROIDITIS: ICD-10-CM

## 2023-11-27 DIAGNOSIS — E06.3 HYPOTHYROIDISM DUE TO HASHIMOTO'S THYROIDITIS: ICD-10-CM

## 2023-11-27 DIAGNOSIS — K50.113 CROHN'S DISEASE OF LARGE INTESTINE WITH FISTULA: ICD-10-CM

## 2023-11-27 DIAGNOSIS — Z00.00 MEDICARE ANNUAL WELLNESS VISIT, SUBSEQUENT: Primary | ICD-10-CM

## 2023-11-27 DIAGNOSIS — K50.914 CROHN'S DISEASE WITH ABSCESS, UNSPECIFIED GASTROINTESTINAL TRACT LOCATION: ICD-10-CM

## 2023-11-27 DIAGNOSIS — E55.9 VITAMIN D DEFICIENCY: ICD-10-CM

## 2023-11-27 DIAGNOSIS — M81.0 OSTEOPOROSIS, UNSPECIFIED OSTEOPOROSIS TYPE, UNSPECIFIED PATHOLOGICAL FRACTURE PRESENCE: ICD-10-CM

## 2023-11-27 DIAGNOSIS — D64.9 ANEMIA, UNSPECIFIED TYPE: ICD-10-CM

## 2023-11-27 DIAGNOSIS — F33.1 MODERATE EPISODE OF RECURRENT MAJOR DEPRESSIVE DISORDER: ICD-10-CM

## 2023-11-27 DIAGNOSIS — M41.9 SCOLIOSIS, UNSPECIFIED SCOLIOSIS TYPE, UNSPECIFIED SPINAL REGION: ICD-10-CM

## 2023-11-27 DIAGNOSIS — Z13.220 SCREENING FOR CHOLESTEROL LEVEL: ICD-10-CM

## 2023-11-27 DIAGNOSIS — R79.89 ELEVATED FERRITIN: ICD-10-CM

## 2023-11-27 DIAGNOSIS — M05.7A RHEUMATOID ARTHRITIS OF OTHER SITE WITH POSITIVE RHEUMATOID FACTOR: ICD-10-CM

## 2023-11-27 LAB
25(OH)D3 SERPL-MCNC: 36.2 NG/ML (ref 30–100)
ALBUMIN SERPL-MCNC: 4.8 G/DL (ref 3.5–5.2)
ALBUMIN/GLOB SERPL: 1.7 G/DL
ALP SERPL-CCNC: 73 U/L (ref 39–117)
ALT SERPL W P-5'-P-CCNC: 19 U/L (ref 1–33)
ANION GAP SERPL CALCULATED.3IONS-SCNC: 10 MMOL/L (ref 5–15)
AST SERPL-CCNC: 24 U/L (ref 1–32)
BASOPHILS # BLD AUTO: 0.07 10*3/MM3 (ref 0–0.2)
BASOPHILS NFR BLD AUTO: 1.3 % (ref 0–1.5)
BILIRUB SERPL-MCNC: 0.4 MG/DL (ref 0–1.2)
BUN SERPL-MCNC: 7 MG/DL (ref 6–20)
BUN/CREAT SERPL: 8.1 (ref 7–25)
CALCIUM SPEC-SCNC: 9.6 MG/DL (ref 8.6–10.5)
CHLORIDE SERPL-SCNC: 101 MMOL/L (ref 98–107)
CHOLEST SERPL-MCNC: 145 MG/DL (ref 0–200)
CO2 SERPL-SCNC: 27 MMOL/L (ref 22–29)
CREAT SERPL-MCNC: 0.86 MG/DL (ref 0.57–1)
DEPRECATED RDW RBC AUTO: 38.9 FL (ref 37–54)
EGFRCR SERPLBLD CKD-EPI 2021: 95.1 ML/MIN/1.73
EOSINOPHIL # BLD AUTO: 0.32 10*3/MM3 (ref 0–0.4)
EOSINOPHIL NFR BLD AUTO: 5.8 % (ref 0.3–6.2)
ERYTHROCYTE [DISTWIDTH] IN BLOOD BY AUTOMATED COUNT: 11.2 % (ref 12.3–15.4)
FERRITIN SERPL-MCNC: 255 NG/ML (ref 13–150)
GLOBULIN UR ELPH-MCNC: 2.8 GM/DL
GLUCOSE SERPL-MCNC: 58 MG/DL (ref 65–99)
HCT VFR BLD AUTO: 40.4 % (ref 34–46.6)
HDLC SERPL-MCNC: 72 MG/DL (ref 40–60)
HGB BLD-MCNC: 13.7 G/DL (ref 12–15.9)
IMM GRANULOCYTES # BLD AUTO: 0.01 10*3/MM3 (ref 0–0.05)
IMM GRANULOCYTES NFR BLD AUTO: 0.2 % (ref 0–0.5)
IRON 24H UR-MRATE: 128 MCG/DL (ref 37–145)
IRON SATN MFR SERPL: 48 % (ref 20–50)
LDLC SERPL CALC-MCNC: 63 MG/DL (ref 0–100)
LDLC/HDLC SERPL: 0.89 {RATIO}
LYMPHOCYTES # BLD AUTO: 1.88 10*3/MM3 (ref 0.7–3.1)
LYMPHOCYTES NFR BLD AUTO: 33.9 % (ref 19.6–45.3)
MCH RBC QN AUTO: 32.4 PG (ref 26.6–33)
MCHC RBC AUTO-ENTMCNC: 33.9 G/DL (ref 31.5–35.7)
MCV RBC AUTO: 95.5 FL (ref 79–97)
MONOCYTES # BLD AUTO: 0.42 10*3/MM3 (ref 0.1–0.9)
MONOCYTES NFR BLD AUTO: 7.6 % (ref 5–12)
NEUTROPHILS NFR BLD AUTO: 2.85 10*3/MM3 (ref 1.7–7)
NEUTROPHILS NFR BLD AUTO: 51.2 % (ref 42.7–76)
NRBC BLD AUTO-RTO: 0 /100 WBC (ref 0–0.2)
PLATELET # BLD AUTO: 301 10*3/MM3 (ref 140–450)
PMV BLD AUTO: 11.2 FL (ref 6–12)
POTASSIUM SERPL-SCNC: 4.2 MMOL/L (ref 3.5–5.2)
PROT SERPL-MCNC: 7.6 G/DL (ref 6–8.5)
RBC # BLD AUTO: 4.23 10*6/MM3 (ref 3.77–5.28)
SODIUM SERPL-SCNC: 138 MMOL/L (ref 136–145)
T3FREE SERPL-MCNC: 3.62 PG/ML (ref 2–4.4)
T4 FREE SERPL-MCNC: 1.66 NG/DL (ref 0.93–1.7)
TIBC SERPL-MCNC: 267 MCG/DL (ref 298–536)
TRANSFERRIN SERPL-MCNC: 179 MG/DL (ref 200–360)
TRIGL SERPL-MCNC: 46 MG/DL (ref 0–150)
TSH SERPL DL<=0.05 MIU/L-ACNC: 0.98 UIU/ML (ref 0.27–4.2)
VLDLC SERPL-MCNC: 10 MG/DL (ref 5–40)
WBC NRBC COR # BLD AUTO: 5.55 10*3/MM3 (ref 3.4–10.8)

## 2023-11-27 PROCEDURE — 85025 COMPLETE CBC W/AUTO DIFF WBC: CPT | Performed by: PHYSICIAN ASSISTANT

## 2023-11-27 PROCEDURE — 82306 VITAMIN D 25 HYDROXY: CPT | Performed by: PHYSICIAN ASSISTANT

## 2023-11-27 PROCEDURE — 84443 ASSAY THYROID STIM HORMONE: CPT | Performed by: PHYSICIAN ASSISTANT

## 2023-11-27 PROCEDURE — 84439 ASSAY OF FREE THYROXINE: CPT | Performed by: PHYSICIAN ASSISTANT

## 2023-11-27 PROCEDURE — 80053 COMPREHEN METABOLIC PANEL: CPT | Performed by: PHYSICIAN ASSISTANT

## 2023-11-27 PROCEDURE — 84466 ASSAY OF TRANSFERRIN: CPT | Performed by: PHYSICIAN ASSISTANT

## 2023-11-27 PROCEDURE — 84481 FREE ASSAY (FT-3): CPT | Performed by: PHYSICIAN ASSISTANT

## 2023-11-27 PROCEDURE — 80061 LIPID PANEL: CPT | Performed by: PHYSICIAN ASSISTANT

## 2023-11-27 PROCEDURE — 83540 ASSAY OF IRON: CPT | Performed by: PHYSICIAN ASSISTANT

## 2023-11-27 PROCEDURE — 82728 ASSAY OF FERRITIN: CPT | Performed by: PHYSICIAN ASSISTANT

## 2023-11-27 RX ORDER — METRONIDAZOLE 250 MG/1
250 TABLET ORAL EVERY MORNING
Qty: 90 TABLET | Refills: 1 | Status: SHIPPED | OUTPATIENT
Start: 2023-11-27

## 2023-11-27 RX ORDER — LOPERAMIDE HYDROCHLORIDE 2 MG/1
2 CAPSULE ORAL 2 TIMES DAILY
Qty: 180 CAPSULE | Refills: 3 | Status: SHIPPED | OUTPATIENT
Start: 2023-11-27

## 2023-11-27 RX ORDER — HYDROXYZINE HYDROCHLORIDE 25 MG/1
25 TABLET, FILM COATED ORAL 2 TIMES DAILY
Qty: 180 TABLET | Refills: 1 | Status: SHIPPED | OUTPATIENT
Start: 2023-11-27

## 2023-11-27 RX ORDER — URSODIOL 300 MG/1
600 CAPSULE ORAL 2 TIMES DAILY
Qty: 360 CAPSULE | Refills: 1 | Status: SHIPPED | OUTPATIENT
Start: 2023-11-27

## 2023-11-27 NOTE — TELEPHONE ENCOUNTER
Pt had appt today with Maria L and is requesting the following refills to send to mail order pharmacy.

## 2023-11-27 NOTE — PROGRESS NOTES
The ABCs of the Annual Wellness Visit  Subsequent Medicare Wellness Visit    Subjective    Claudia Wilkins is a 28 y.o. female who presents for a Subsequent Medicare Wellness Visit.    The following portions of the patient's history were reviewed and   updated as appropriate: allergies, current medications, past family history, past medical history, past social history, past surgical history, and problem list.    Compared to one year ago, the patient feels her physical   health is the same.      Compared to one year ago, the patient feels her mental   health is better.She feels depression has improved.       Recent Hospitalizations:  She was not admitted to the hospital during the last year.       Current Medical Providers:  Patient Care Team:  Demi Fernandez MD as PCP - General (Internal Medicine)    Outpatient Medications Prior to Visit   Medication Sig Dispense Refill    Adalimumab (HUMIRA) 40 MG/0.4ML Pen-injector Kit Inject 40 mg under the skin into the appropriate area as directed Every 14 (Fourteen) Days.      Calcium Carb-Cholecalciferol (Oyster Shell Calcium w/D) 500-5 MG-MCG tablet TAKE 1 TABLET BY MOUTH TWICE DAILY 180 tablet 0    cyclobenzaprine (FLEXERIL) 5 MG tablet TAKE 1 TABLET TWICE DAILY  AS NEEDED FOR MUSCLE SPASMS 180 tablet 0    FeroSul 325 (65 Fe) MG tablet TAKE 1 TABLET BY MOUTH TWICE DAILY 180 tablet 0    HYDROcodone-acetaminophen (Norco) 5-325 MG per tablet Take 1 tablet by mouth Every 6 (Six) Hours As Needed for Mild Pain. 8 tablet 0    Incontinence Supply Disposable (Comfort Protect Adult Diaper/M) misc 1 each 5 (Five) Times a Day As Needed (for incontinence). 50 each 1    Lactobacillus Acid-Pectin (Acidophilus/Pectin) capsule Take 1 capsule by mouth 2 (Two) Times a Day. 180 capsule 3    NON FORMULARY Pain medicated lotion with Gabapentin. Patient uses for Chronic pain/Arthritis      Synthroid 100 MCG tablet TAKE 1 TABLET DAILY 90 tablet 0    hydrOXYzine (ATARAX) 25 MG tablet TAKE 1  "TABLET TWICE A  tablet 1    loperamide (IMODIUM) 2 MG capsule Take 1 capsule by mouth 2 (Two) Times a Day. 180 capsule 3    metroNIDAZOLE (FLAGYL) 250 MG tablet Take 1 tablet by mouth Every Morning. 90 tablet 0    sertraline (ZOLOFT) 50 MG tablet TAKE 1 TABLET DAILY 90 tablet 1    ursodiol (ACTIGALL) 300 MG capsule TAKE 2 CAPSULES TWICE DAILY 360 capsule 1     No facility-administered medications prior to visit.       Opioid medication/s are on active medication list.  and I have evaluated her active treatment plan and pain score trends (see table).  Vitals:    11/27/23 1324   PainSc:   3   PainLoc: Generalized     I have reviewed the chart for potential of high risk medication and harmful drug interactions in the elderly.          Aspirin is not on active medication list.  Aspirin use is not indicated based on review of current medical condition/s. Risk of harm outweighs potential benefits.  .    Patient Active Problem List   Diagnosis    Ulcerative colitis    Seasonal allergies    Scoliosis    Posttraumatic stress disorder    Primary sclerosing cholangitis    Osteopenia    Moderate episode of recurrent major depressive disorder    Lactose intolerance    Insomnia    Rheumatoid arthritis    Abscess of vulva    Allergic rhinitis    Anal fistula    Anemia    Anxiety    Chronic liver disease    Fibromyalgia    Hemorrhoids    Hypothyroidism    IBS (irritable bowel syndrome)    Ileal pouchitis    Celiac disease    Hypermobility syndrome    Urinary frequency     Advance Care Planning   Advance Care Planning     Advance Directive is on file.  ACP discussion was held with the patient during this visit. Patient has an advance directive in EMR which is still valid.      Objective    Vitals:    11/27/23 1324   BP: 104/70   BP Location: Left arm   Patient Position: Sitting   Cuff Size: Adult   Pulse: 89   Resp: 15   Temp: 98.6 °F (37 °C)   TempSrc: Temporal   SpO2: 98%   Weight: 54.4 kg (120 lb)   Height: 157.5 cm (62\") " "  PainSc:   3   PainLoc: Generalized     Estimated body mass index is 21.95 kg/m² as calculated from the following:    Height as of this encounter: 157.5 cm (62\").    Weight as of this encounter: 54.4 kg (120 lb).    BMI is within normal parameters. No other follow-up for BMI required.      Does the patient have evidence of cognitive impairment? Yes    Lab Results   Component Value Date    TRIG 46 2023    HDL 72 (H) 2023    LDL 63 2023    VLDL 10 2023        HEALTH RISK ASSESSMENT    Smoking Status:  Social History     Tobacco Use   Smoking Status Never   Smokeless Tobacco Never     Alcohol Consumption:  Social History     Substance and Sexual Activity   Alcohol Use Never     Fall Risk Screen:    ALYSSA Fall Risk Assessment was completed, and patient is at LOW risk for falls.Assessment completed on:2023    Depression Screenin/27/2023     1:27 PM   PHQ-2/PHQ-9 Depression Screening   Little Interest or Pleasure in Doing Things 0-->not at all   Feeling Down, Depressed or Hopeless 0-->not at all   PHQ-9: Brief Depression Severity Measure Score 0       Health Habits and Functional and Cognitive Screenin/27/2023     1:00 PM   Functional & Cognitive Status   Do you have difficulty preparing food and eating? Yes   Do you have difficulty bathing yourself, getting dressed or grooming yourself? Yes   Do you have difficulty using the toilet? No   Do you have difficulty moving around from place to place? Yes   Do you have trouble with steps or getting out of a bed or a chair? Yes   Current Diet Well Balanced Diet   Dental Exam Not up to date   Eye Exam Not up to date   Exercise (times per week) 0 times per week   Current Exercises Include No Regular Exercise   Do you need help using the phone?  No   Are you deaf or do you have serious difficulty hearing?  No   Do you need help to go to places out of walking distance? Yes   Do you need help shopping? Yes   Do you need help " preparing meals?  Yes   Do you need help with housework?  Yes   Do you need help with laundry? Yes   Do you need help taking your medications? Yes   Do you need help managing money? Yes   Do you ever drive or ride in a car without wearing a seat belt? No   Have you felt unusual stress, anger or loneliness in the last month? Yes   Who do you live with? Other   If you need help, do you have trouble finding someone available to you? No   Have you been bothered in the last four weeks by sexual problems? No   Do you have difficulty concentrating, remembering or making decisions? No       Age-appropriate Screening Schedule:  Refer to the list below for future screening recommendations based on patient's age, sex and/or medical conditions. Orders for these recommended tests are listed in the plan section. The patient has been provided with a written plan.    Health Maintenance   Topic Date Due    DXA SCAN  09/28/2020    PAP SMEAR  Never done    COVID-19 Vaccine (2 - 2023-24 season) 09/01/2023    ANNUAL WELLNESS VISIT  11/27/2024    TDAP/TD VACCINES (3 - Td or Tdap) 12/10/2031    HEPATITIS C SCREENING  Completed    Hepatitis B  Completed    INFLUENZA VACCINE  Completed    Pneumococcal Vaccine 0-64  Aged Out            CMS Preventative Services Quick Reference  Risk Factors Identified During Encounter  Chronic Pain: Natural history and expected course discussed. Questions answered.  The above risks/problems have been discussed with the patient.  Pertinent information has been shared with the patient in the After Visit Summary.  An After Visit Summary and PPPS were made available to the patient.    Follow Up:   Next Medicare Wellness visit to be scheduled in 1 year.       Additional E&M Note during same encounter follows:  Patient has multiple medical problems which are significant and separately identifiable that require additional work above and beyond the Medicare Wellness Visit.      Chief Complaint  Annual  "Exam    Subjective        HPI  Claudia Wilkins is also being seen today for numerous complaints    Scoliosis: mom states pt was dx with scoliosis years ago but never had imaging  Would like to get imaging of her back since pt sits lopsided    Chronic joint pain: pt has pain all over body   She has not seen rheumatologist recently and is not currently taking any medicine for RA    Crohn's: has had perianal fistula and fisculectomy this year which made her health worse. She is not currently taking maintenance medicine for this    Pt feels depression has been doing better lately  Anxiety: feels anxiety has been worse  Only taking hydroxazine once daily    Anemia: mom states she has hx of anemia and would like her labs to be checked.       Objective   Vital Signs:  /70 (BP Location: Left arm, Patient Position: Sitting, Cuff Size: Adult)   Pulse 89   Temp 98.6 °F (37 °C) (Temporal)   Resp 15   Ht 157.5 cm (62\")   Wt 54.4 kg (120 lb)   SpO2 98%   BMI 21.95 kg/m²     Physical Exam  Vitals reviewed.   Constitutional:       Appearance: Normal appearance.   HENT:      Head: Normocephalic and atraumatic.      Nose: Nose normal.      Mouth/Throat:      Mouth: Mucous membranes are moist.   Eyes:      Extraocular Movements: Extraocular movements intact.      Conjunctiva/sclera: Conjunctivae normal.      Pupils: Pupils are equal, round, and reactive to light.   Cardiovascular:      Rate and Rhythm: Normal rate and regular rhythm.   Pulmonary:      Effort: Pulmonary effort is normal.      Breath sounds: Normal breath sounds.   Abdominal:      General: Abdomen is flat. Bowel sounds are normal.      Palpations: Abdomen is soft.   Musculoskeletal:         General: Normal range of motion.   Neurological:      General: No focal deficit present.      Mental Status: She is alert and oriented to person, place, and time.   Psychiatric:         Mood and Affect: Mood normal.                         Assessment and Plan   Diagnoses " and all orders for this visit:    1. Medicare annual wellness visit, subsequent (Primary)  Comments:  Discussed preventative medicine reccomendations. Pt recieved flu shot today.    2. Scoliosis, unspecified scoliosis type, unspecified spinal region  Assessment & Plan:  Will get Xray and refer appropriately based on results    Orders:  -     XR Spine Scoliosis 2 or 3 Views; Future    3. Crohn's disease of large intestine with fistula  -     Comprehensive Metabolic Panel  -     CBC & Differential    4. Osteoporosis, unspecified osteoporosis type, unspecified pathological fracture presence  Comments:  Will order updated dexa.  Orders:  -     Cancel: DEXA Bone Density Axial  -     DEXA Bone Density Axial    5. Rheumatoid arthritis of other site with positive rheumatoid factor  Assessment & Plan:  Strongly encouraged pt and mom to follow up with specialist as directed to discuss treatment options for pain.      6. Hypothyroidism due to Hashimoto's thyroiditis  Comments:  Labs today, will adjust medicine if indicated.  Orders:  -     TSH  -     T3, Free  -     T4, Free    7. Screening for cholesterol level  -     Lipid Panel    8. Vitamin D deficiency  -     Vitamin D 25 hydroxy    9. Anemia, unspecified type  -     Ferritin  -     Iron Profile    10. Elevated ferritin  -     Ferritin; Future    11. Anxiety  Assessment & Plan:  Anxiety not well controlled. Discussed tx options in detail with pt and mom. Discussed increasing SSRI, changing SSRI, increasing hydroxazine. Will increase hydroxazine to bid at this time and then pt will f/u with PCP in 1 month. To er if si/hi.      12. Moderate episode of recurrent major depressive disorder    13. Crohn's disease with abscess, unspecified gastrointestinal tract location  Comments:  Strongly encouraged pt and mom to follow up with specialist as directed to discuss treatment options for sx.    Other orders  -     Fluzone (or Fluarix & Flulaval for VFC) >6mos             Follow Up    Return in about 6 weeks (around 1/8/2024) for with Dr. Fernandez.  Patient was given instructions and counseling regarding her condition or for health maintenance advice. Please see specific information pulled into the AVS if appropriate.

## 2023-11-30 NOTE — PROGRESS NOTES
"Chief Complaint  Annual Exam    Subjective    {Problem List  Visit Diagnosis   Encounters  Notes  Medications  Labs  Result Review Imaging  Media :23}    Claudia Wilkins presents to [facility]  History of Present Illness  Pt here for AWV but also has numerous complaints    Objective   Vital Signs:  /70 (BP Location: Left arm, Patient Position: Sitting, Cuff Size: Adult)   Pulse 89   Temp 98.6 °F (37 °C) (Temporal)   Resp 15   Ht 157.5 cm (62\")   Wt 54.4 kg (120 lb)   SpO2 98%   BMI 21.95 kg/m²   Estimated body mass index is 21.95 kg/m² as calculated from the following:    Height as of this encounter: 157.5 cm (62\").    Weight as of this encounter: 54.4 kg (120 lb).       BMI is within normal parameters. No other follow-up for BMI required.      Physical Exam   Result Review :{Labs  Result Review  Imaging  Med Tab  Media  Procedures :23}  {The following data was reviewed by (Optional):06218}  {Ambulatory Labs (Optional):95324}  {Data reviewed (Optional):54594:::1}             Assessment and Plan {CC Problem List  Visit Diagnosis   ROS  Review (Popup)  [facility] Maintenance  Quality  BestPractice  Medications  SmartSets  SnapShot Encounters  Media :23}  Diagnoses and all orders for this visit:    1. Medicare annual wellness visit, subsequent (Primary)    2. Scoliosis, unspecified scoliosis type, unspecified spinal region  -     XR Spine Scoliosis 2 or 3 Views; Future    3. Crohn's disease of large intestine with fistula  -     Comprehensive Metabolic Panel  -     CBC & Differential    4. Osteoporosis, unspecified osteoporosis type, unspecified pathological fracture presence  -     Cancel: DEXA Bone Density Axial  -     DEXA Bone Density Axial    5. Rheumatoid arthritis of other site with positive rheumatoid factor    6. Hypothyroidism due to Hashimoto's thyroiditis  -     TSH  -     T3, Free  -     T4, Free    7. Screening for " cholesterol level  -     Lipid Panel    8. Vitamin D deficiency  -     Vitamin D 25 hydroxy    9. Anemia, unspecified type  -     Ferritin  -     Iron Profile    10. Elevated ferritin  -     Ferritin; Future    Other orders  -     Fluzone (or Fluarix & Flulaval for VFC) >6mos           {Time Spent (Optional):87117}  Follow Up {Instructions Charge Capture  Follow-up Communications :23}  Return in about 6 weeks (around 1/8/2024) for with Dr. Fernandez.  Patient was given instructions and counseling regarding her condition or for health maintenance advice. Please see specific information pulled into the AVS if appropriate.

## 2023-11-30 NOTE — ASSESSMENT & PLAN NOTE
Strongly encouraged pt and mom to follow up with specialist as directed to discuss treatment options for pain.

## 2023-11-30 NOTE — ASSESSMENT & PLAN NOTE
Anxiety not well controlled. Discussed tx options in detail with pt and mom. Discussed increasing SSRI, changing SSRI, increasing hydroxazine. Will increase hydroxazine to bid at this time and then pt will f/u with PCP in 1 month. To er if si/hi.

## 2023-12-04 ENCOUNTER — LAB (OUTPATIENT)
Dept: LAB | Facility: HOSPITAL | Age: 28
End: 2023-12-04
Payer: MEDICARE

## 2023-12-04 ENCOUNTER — TELEPHONE (OUTPATIENT)
Dept: GASTROENTEROLOGY | Facility: CLINIC | Age: 28
End: 2023-12-04
Payer: MEDICARE

## 2023-12-04 ENCOUNTER — OFFICE VISIT (OUTPATIENT)
Dept: GASTROENTEROLOGY | Facility: CLINIC | Age: 28
End: 2023-12-04
Payer: MEDICARE

## 2023-12-04 VITALS
SYSTOLIC BLOOD PRESSURE: 118 MMHG | BODY MASS INDEX: 22.01 KG/M2 | DIASTOLIC BLOOD PRESSURE: 52 MMHG | HEART RATE: 100 BPM | HEIGHT: 62 IN | WEIGHT: 119.6 LBS

## 2023-12-04 DIAGNOSIS — K50.813 CROHN'S DISEASE OF BOTH SMALL AND LARGE INTESTINE WITH FISTULA: Primary | ICD-10-CM

## 2023-12-04 DIAGNOSIS — R10.11 RIGHT UPPER QUADRANT ABDOMINAL PAIN: ICD-10-CM

## 2023-12-04 DIAGNOSIS — Z11.59 ENCOUNTER FOR SCREENING FOR OTHER VIRAL DISEASES: ICD-10-CM

## 2023-12-04 DIAGNOSIS — K50.813 CROHN'S DISEASE OF BOTH SMALL AND LARGE INTESTINE WITH FISTULA: ICD-10-CM

## 2023-12-04 PROCEDURE — 36415 COLL VENOUS BLD VENIPUNCTURE: CPT

## 2023-12-04 PROCEDURE — 86480 TB TEST CELL IMMUN MEASURE: CPT

## 2023-12-04 PROCEDURE — 86704 HEP B CORE ANTIBODY TOTAL: CPT

## 2023-12-04 NOTE — PROGRESS NOTES
Chief Complaint     Follow-up (Chron's with fistula)    History of Present Illness     Claudia Wilkins is a 28 y.o. female who presents to [facility] for follow-up of crohn's.      She was last evaluated in 2021.  She was diagnosed with UC in 1999 and subsequently underwent colectomy with J pouch per Dr. Davis in 2009.  Since that time she has had recurrent episodes of pouchitis and has been evaluated by several different gastroenterologists.  Her Mother is present at today's visit and provides most of patient history.      History of ulcerative colitis status post total proctocolectomy with J-pouch performed at [facility].  Since that time she has developed perirectal abscess with seton drain placement July 2021 by Dr. Gardner.  She is on chronic Flagyl for pouchitis.  She has a history of sclerosing cholangitis.  She is thought possibly to have Crohn's disease.      Evaluated by Dr. Villatoro - Status post flexible pouchoscopy 6/2/2023 with left anterior transsphincteric fistula noted with noncutting seton drain in place.  No evidence of infection.  No inflammation of the distal ileum.  Minimal inflammation of the pouch.  No stricture.  Biopsies of the ileum and small bowel pouch with no activity, no granulomas, no dysplasia.  Status post exam under anesthesia with ligation of intersphincteric fistula tract 6/30/2023.  Pathology with benign anal mucosa with fibrosis and inflammation.      Her last f/u with Dr. Villatoro, she was experiencing drainage from the fistula site as it hadn't completely closed.  Discussed biologic treatment.       Previous IBD treatment - steroids, sulfasalazine and remicade.      Reports diarrhea that's present daily.  Takes two imodium BID.  Still has several bowel movements per day.      Admits intermittent RUQ pain.  Previous dx of PSC.  Liver enzymes normal.  No recent imaging.  Takes jose daily.       History      Past Medical History:    Diagnosis Date    Anemia     Anxiety     AR (allergic rhinitis)     Arthritis     Celiac disease     CFS (chronic fatigue syndrome)     Chronic liver disease     Chronic pain     Crohn's disease     Depression     Fibromyalgia     Fibromyalgia, primary     Gastric ulcer     H/O psychiatric care     Hemorrhoids     Hypermobility syndrome     Hypothyroidism     IBS (irritable bowel syndrome)     Insomnia     Lactose intolerance     Moderate episode of recurrent major depressive disorder 01/29/2018    CONTINUE CURRENT MEDS     Nasal crusting     Osteopenia     Pancreatitis     Panic attacks     Pouchitis     Primary sclerosing cholangitis     PTSD (post-traumatic stress disorder)     RA (rheumatoid arthritis)     Recurrent epistaxis     Scoliosis     Ulcerative colitis      Past Surgical History:   Procedure Laterality Date    ANAL FISSURECTOMY/FISTULECTOMY N/A 6/30/2023    Procedure: ANAL FISTULA REPAIR ligation intersphincteric tract;  Surgeon: Jim Villatoro MD;  Location[place] [facility] [place] [facility];  Service: General;  Laterality: N/A;    COLECTOMY TOTAL  2006    COLECTOMY W/ ILEOANAI J POUCH & SMALL BOWEL PULL THROUGH     COLON SURGERY      COLONOSCOPY      ENDOSCOPY      INCISION AND DRAINAGE PERIRECTAL ABSCESS      SIGMOIDOSCOPY N/A 6/2/2023    Procedure: POUCHOSCOPY WITH BIOPSIES;  Surgeon: Jim Villatoro MD;  Location: [facility] ENDOSCOPY;  Service: General;  Laterality: N/A;  ULCERATIVE COLITIS     Family History   Problem Relation Age of Onset    Anxiety disorder Mother     Asthma Sister     Diabetes Sister     Arthritis Maternal Grandmother     Colon polyps Maternal Grandmother     Cancer Maternal Grandfather     Heart disease Maternal Grandfather     Prostate cancer Paternal Grandfather     Stroke Other     Heart disease Other     Kidney disease Other     Diabetes Other     Rectal cancer Other     Colon cancer Other         Current Medications       Current Outpatient Medications:     cyclobenzaprine  "(FLEXERIL) 5 MG tablet, TAKE 1 TABLET TWICE DAILY  AS NEEDED FOR MUSCLE SPASMS, Disp: 180 tablet, Rfl: 0    hydrOXYzine (ATARAX) 25 MG tablet, Take 1 tablet by mouth 2 (Two) Times a Day., Disp: 180 tablet, Rfl: 1    Incontinence Supply Disposable (Comfort Protect Adult Diaper/M) misc, 1 each 5 (Five) Times a Day As Needed (for incontinence)., Disp: 50 each, Rfl: 1    Lactobacillus Acid-Pectin (Acidophilus/Pectin) capsule, Take 1 capsule by mouth 2 (Two) Times a Day., Disp: 180 capsule, Rfl: 3    loperamide (IMODIUM) 2 MG capsule, Take 1 capsule by mouth 2 (Two) Times a Day., Disp: 180 capsule, Rfl: 3    metroNIDAZOLE (FLAGYL) 250 MG tablet, Take 1 tablet by mouth Every Morning., Disp: 90 tablet, Rfl: 1    NON FORMULARY, Pain medicated lotion with Gabapentin. Patient uses for Chronic pain/Arthritis, Disp: , Rfl:     sertraline (ZOLOFT) 50 MG tablet, Take 1 tablet by mouth Daily., Disp: 90 tablet, Rfl: 1    Synthroid 100 MCG tablet, TAKE 1 TABLET DAILY, Disp: 90 tablet, Rfl: 0    ursodiol (ACTIGALL) 300 MG capsule, Take 2 capsules by mouth 2 (Two) Times a Day., Disp: 360 capsule, Rfl: 1    FeroSul 325 (65 Fe) MG tablet, TAKE 1 TABLET BY MOUTH TWICE DAILY (Patient not taking: Reported on 12/4/2023), Disp: 180 tablet, Rfl: 0     Allergies     Allergies   Allergen Reactions    Gluten Meal Other (See Comments)     Celiac disease     Latex Other (See Comments), Rash and Swelling       Social History       Social History     Social History Narrative    Not on file         Objective       /52 (BP Location: Left arm, Patient Position: Sitting, Cuff Size: Small Adult)   Pulse 100   Ht 157.5 cm (62\")   Wt 54.3 kg (119 lb 9.6 oz)   BMI 21.88 kg/m²       Physical Exam    Results       Result Review :    The following data was reviewed by: ERIK Oliveros on 12/04/2023:    CBC w/diff          11/27/2023    14:57   CBC w/Diff   WBC 5.55    RBC 4.23    Hemoglobin 13.7    Hematocrit 40.4    MCV 95.5    MCH 32.4  "   MCHC 33.9    RDW 11.2    Platelets 301    Neutrophil Rel % 51.2    Immature Granulocyte Rel % 0.2    Lymphocyte Rel % 33.9    Monocyte Rel % 7.6    Eosinophil Rel % 5.8    Basophil Rel % 1.3      CMP          11/27/2023    14:57   CMP   Glucose 58    BUN 7    Creatinine 0.86    EGFR 95.1    Sodium 138    Potassium 4.2    Chloride 101    Calcium 9.6    Total Protein 7.6    Albumin 4.8    Globulin 2.8    Total Bilirubin 0.4    Alkaline Phosphatase 73    AST (SGOT) 24    ALT (SGPT) 19    Albumin/Globulin Ratio 1.7    BUN/Creatinine Ratio 8.1    Anion Gap 10.0        Iron Profile   Iron   Date Value Ref Range Status   11/27/2023 128 37 - 145 mcg/dL Final     TIBC   Date Value Ref Range Status   11/27/2023 267 (L) 298 - 536 mcg/dL Final     Iron Saturation (TSAT)   Date Value Ref Range Status   11/27/2023 48 20 - 50 % Final     Transferrin   Date Value Ref Range Status   11/27/2023 179 (L) 200 - 360 mg/dL Final     Ferritin   Ferritin   Date Value Ref Range Status   11/27/2023 255.00 (H) 13.00 - 150.00 ng/mL Final     ESR (Sed Rate)   Sed Rate   Date Value Ref Range Status   04/18/2022 23 (H) 0 - 20 mm/hr Final     CRP (C-Reactive)   C-Reactive Protein   Date Value Ref Range Status   04/18/2022 <0.30 0.00 - 0.50 mg/dL Final            Assessment and Plan              Diagnoses and all orders for this visit:    1. Crohn's disease of both small and large intestine with fistula (Primary)  -     QuantiFERON TB Gold; Future  -     Hepatitis B Core Antibody, Total; Future    2. Right upper quadrant abdominal pain  -     US Abdomen Limited; Future    3. Encounter for screening for other viral diseases  -     Hepatitis B Core Antibody, Total; Future      Discussed beginning treatment with Rinvoq with patient and mother.  Risk vs benefit discussed.  Medication information given to patient and mother.          Follow Up     Follow Up   Return in about 3 months (around 3/4/2024).  Patient was given instructions and counseling  regarding her condition or for health maintenance advice. Please see specific information pulled into the AVS if appropriate.

## 2023-12-04 NOTE — TELEPHONE ENCOUNTER
----- Message from ERIK Oliveros sent at 12/4/2023 11:12 AM EST -----  Regarding: Rinvoq  Please begin PA process for Rinvoq.  Labs ordered today.  Rinvoq 45 mg once daily for 12 weeks; maintenance: 15 mg once daily

## 2023-12-05 LAB — HBV CORE AB SERPL QL IA: NEGATIVE

## 2023-12-06 LAB
GAMMA INTERFERON BACKGROUND BLD IA-ACNC: 0 IU/ML
M TB IFN-G BLD-IMP: NEGATIVE
M TB IFN-G CD4+ T-CELLS BLD-ACNC: 0 IU/ML
M TBIFN-G CD4+ CD8+T-CELLS BLD-ACNC: 0 IU/ML
MITOGEN IGNF BLD-ACNC: >10 IU/ML
QUANTIFERON INCUBATION: NORMAL
SERVICE CMNT-IMP: NORMAL

## 2023-12-14 ENCOUNTER — HOSPITAL ENCOUNTER (OUTPATIENT)
Dept: ULTRASOUND IMAGING | Facility: HOSPITAL | Age: 28
Discharge: HOME | End: 2023-12-14
Payer: MEDICARE

## 2023-12-14 ENCOUNTER — HOSPITAL ENCOUNTER (OUTPATIENT)
Dept: BONE DENSITY | Facility: HOSPITAL | Age: 28
Discharge: HOME | End: 2023-12-14
Payer: MEDICARE

## 2023-12-14 ENCOUNTER — HOSPITAL ENCOUNTER (OUTPATIENT)
Dept: GENERAL RADIOLOGY | Facility: HOSPITAL | Age: 28
Discharge: HOME | End: 2023-12-14
Payer: MEDICARE

## 2023-12-14 DIAGNOSIS — M81.0 OSTEOPOROSIS, UNSPECIFIED OSTEOPOROSIS TYPE, UNSPECIFIED PATHOLOGICAL FRACTURE PRESENCE: ICD-10-CM

## 2023-12-14 DIAGNOSIS — R10.11 RIGHT UPPER QUADRANT ABDOMINAL PAIN: ICD-10-CM

## 2023-12-14 DIAGNOSIS — M41.9 SCOLIOSIS, UNSPECIFIED SCOLIOSIS TYPE, UNSPECIFIED SPINAL REGION: ICD-10-CM

## 2023-12-14 PROCEDURE — 77080 DXA BONE DENSITY AXIAL: CPT

## 2023-12-14 PROCEDURE — 72082 X-RAY EXAM ENTIRE SPI 2/3 VW: CPT

## 2023-12-14 PROCEDURE — 76705 ECHO EXAM OF ABDOMEN: CPT

## 2023-12-15 ENCOUNTER — TELEPHONE (OUTPATIENT)
Dept: GASTROENTEROLOGY | Facility: CLINIC | Age: 28
End: 2023-12-15
Payer: MEDICARE

## 2023-12-15 DIAGNOSIS — M41.9 SCOLIOSIS, UNSPECIFIED SCOLIOSIS TYPE, UNSPECIFIED SPINAL REGION: Primary | ICD-10-CM

## 2024-01-09 RX ORDER — METRONIDAZOLE 250 MG/1
250 TABLET ORAL EVERY MORNING
Qty: 90 TABLET | Refills: 0 | Status: SHIPPED | OUTPATIENT
Start: 2024-01-09

## 2024-01-09 RX ORDER — METRONIDAZOLE 250 MG/1
250 TABLET ORAL EVERY MORNING
Qty: 90 TABLET | Refills: 1 | OUTPATIENT
Start: 2024-01-09

## 2024-01-11 ENCOUNTER — PATIENT MESSAGE (OUTPATIENT)
Dept: GASTROENTEROLOGY | Facility: CLINIC | Age: 29
End: 2024-01-11
Payer: MEDICARE

## 2024-04-09 ENCOUNTER — TELEPHONE (OUTPATIENT)
Dept: SURGERY | Facility: CLINIC | Age: 29
End: 2024-04-09
Payer: MEDICARE

## 2024-04-09 NOTE — TELEPHONE ENCOUNTER
LMOM     Calling to inform patient mom to call Dr. Demarco office at [phone] in 24 hours to make appointment. I will call to follow up on Thursday morning to see if they have scheduled or not.

## 2024-04-22 ENCOUNTER — TELEPHONE (OUTPATIENT)
Dept: INTERNAL MEDICINE | Facility: CLINIC | Age: 29
End: 2024-04-22
Payer: MEDICARE

## 2024-04-22 NOTE — TELEPHONE ENCOUNTER
Caller: [facility] [address] - [phone]  - [phone] FX    Relationship: Pharmacy    Best call back number: [phone]     Requested Prescriptions:     NON FORMULARY     Requested Prescriptions      No prescriptions requested or ordered in this encounter        Pharmacy where request should be sent: [facility] [address] - [phone]  - [phone] FX     Last office visit with prescribing clinician: Visit date not found   Last telemedicine visit with prescribing clinician: Visit date not found   Next office visit with prescribing clinician: Visit date not found     Additional details provided by patient: PAIN CREAM    Does the patient have less than a 3 day supply:  [] Yes  [] No    Would you like a call back once the refill request has been completed: [] Yes [] No    If the office needs to give you a call back, can they leave a voicemail: [] Yes [] No    Dickson Mckee Rep   04/22/24 10:47 EDT

## 2024-04-23 NOTE — TELEPHONE ENCOUNTER
That is fine, please use paper order sheet we have in clinic or just call and refill if they will take it that way.

## 2024-07-11 RX ORDER — LOPERAMIDE HYDROCHLORIDE 2 MG/1
2 CAPSULE ORAL 2 TIMES DAILY
Qty: 180 CAPSULE | Refills: 3 | Status: SHIPPED | OUTPATIENT
Start: 2024-07-11

## 2024-07-11 RX ORDER — GARLIC EXTRACT 500 MG
1 CAPSULE ORAL 2 TIMES DAILY
Qty: 180 CAPSULE | Refills: 3 | Status: SHIPPED | OUTPATIENT
Start: 2024-07-11

## 2024-07-11 RX ORDER — CYCLOBENZAPRINE HCL 5 MG
5 TABLET ORAL 2 TIMES DAILY PRN
Qty: 180 TABLET | Refills: 0 | Status: SHIPPED | OUTPATIENT
Start: 2024-07-11

## 2024-07-11 RX ORDER — URSODIOL 300 MG/1
600 CAPSULE ORAL 2 TIMES DAILY
Qty: 360 CAPSULE | Refills: 1 | Status: SHIPPED | OUTPATIENT
Start: 2024-07-11

## 2024-07-11 RX ORDER — HYDROXYZINE HYDROCHLORIDE 25 MG/1
25 TABLET, FILM COATED ORAL 2 TIMES DAILY
Qty: 180 TABLET | Refills: 1 | Status: SHIPPED | OUTPATIENT
Start: 2024-07-11

## 2024-07-11 RX ORDER — LEVOTHYROXINE SODIUM 0.1 MG/1
100 TABLET ORAL DAILY
Qty: 90 TABLET | Refills: 0 | Status: SHIPPED | OUTPATIENT
Start: 2024-07-11

## 2024-07-11 RX ORDER — FERROUS SULFATE 325(65) MG
1 TABLET ORAL 2 TIMES DAILY
Qty: 180 TABLET | Refills: 0 | Status: SHIPPED | OUTPATIENT
Start: 2024-07-11

## 2024-07-11 RX ORDER — METRONIDAZOLE 250 MG/1
250 TABLET ORAL EVERY MORNING
Qty: 90 TABLET | Refills: 0 | Status: SHIPPED | OUTPATIENT
Start: 2024-07-11

## 2024-08-15 ENCOUNTER — PREP FOR SURGERY (OUTPATIENT)
Dept: OTHER | Facility: HOSPITAL | Age: 29
End: 2024-08-15
Payer: MEDICARE

## 2024-08-15 ENCOUNTER — OFFICE VISIT (OUTPATIENT)
Dept: SURGERY | Facility: CLINIC | Age: 29
End: 2024-08-15
Payer: MEDICARE

## 2024-08-15 VITALS — BODY MASS INDEX: 21.12 KG/M2 | RESPIRATION RATE: 14 BRPM | HEIGHT: 62 IN | WEIGHT: 114.8 LBS

## 2024-08-15 DIAGNOSIS — K60.3 ANAL FISTULA: Primary | ICD-10-CM

## 2024-08-15 RX ORDER — ELECTROLYTES/DEXTROSE
1 SOLUTION, ORAL ORAL EVERY 12 HOURS SCHEDULED
COMMUNITY
Start: 2024-07-11

## 2024-08-15 NOTE — LETTER
August 16, 2024       No Recipients    Patient: Claudia Wilkins   YOB: 1995   Date of Visit: 8/15/2024     Dear Demi Fernandez MD:       Thank you for referring Claudia Wilkins to me for evaluation. Below are the relevant portions of my assessment and plan of care.    If you have questions, please do not hesitate to call me. I look forward to following Claudia along with you.         Sincerely,        Jim Villatoro MD        CC:   No Recipients    Jim Villatoro MD  08/16/24 1312  Sign when Signing Visit  General Surgery/Colorectal Surgery Note    Patient Name:  Claudia Wilkins  YOB: 1995  [id number]    Referring Provider: No ref. provider found      Patient Care Team:  Demi Fernandez MD as PCP - General (Internal Medicine)    Chief complaint follow-up    Subjective.     History of present illness:    History of ulcerative colitis status post total proctocolectomy with J-pouch performed at [facility].  Since that time she has developed perirectal abscess with seton drain placement July 2021 by Dr. Gardner.  She is on chronic Flagyl for pouchitis.  She has a history of sclerosing cholangitis.  She is thought possibly to have Crohn's disease.     Status post flexible pouchoscopy 6/2/2023 with left anterior transsphincteric fistula noted with noncutting seton drain in place.  No evidence of infection.  No inflammation of the distal ileum.  Minimal inflammation of the pouch.  No stricture.  Biopsies of the ileum and small bowel pouch with no activity, no granulomas, no dysplasia    Status post exam under anesthesia with ligation of intersphincteric fistula tract 6/30/2023.  Pathology with benign anal mucosa with fibrosis and inflammation    She comes in for evaluation of bloodstained drainage from the site of the previous fistula for 2 days.  Associated pain.  No fever.  No current treatment for Crohn's disease.  No blood thinner use.  No changes in  health or medications otherwise since last seen.  No imaging.      History:  Past Medical History:   Diagnosis Date   • Anemia    • Anxiety    • AR (allergic rhinitis)    • Arthritis    • Celiac disease    • CFS (chronic fatigue syndrome)    • Chronic liver disease    • Chronic pain    • Crohn's disease    • Depression    • Fibromyalgia    • Fibromyalgia, primary    • Gastric ulcer    • H/O psychiatric care    • Hemorrhoids    • Hypermobility syndrome    • Hypothyroidism    • IBS (irritable bowel syndrome)    • Insomnia    • Lactose intolerance    • Moderate episode of recurrent major depressive disorder 01/29/2018    CONTINUE CURRENT MEDS    • Nasal crusting    • Osteopenia    • Pancreatitis    • Panic attacks    • Pouchitis    • Primary sclerosing cholangitis    • PTSD (post-traumatic stress disorder)    • RA (rheumatoid arthritis)    • Recurrent epistaxis    • Scoliosis    • Ulcerative colitis        Past Surgical History:   Procedure Laterality Date   • ANAL FISSURECTOMY/FISTULECTOMY N/A 6/30/2023    Procedure: ANAL FISTULA REPAIR ligation intersphincteric tract;  Surgeon: Jim Villatoro MD;  [place] [facility] [place] [facility] [place]  Service: General;  Laterality: N/A;   • COLECTOMY TOTAL  2006    COLECTOMY W/ ILEOANAI J POUCH & SMALL BOWEL PULL THROUGH    • COLON SURGERY     • COLONOSCOPY     • ENDOSCOPY     • INCISION AND DRAINAGE PERIRECTAL ABSCESS     • SIGMOIDOSCOPY N/A 6/2/2023    Procedure: POUCHOSCOPY WITH BIOPSIES;  Surgeon: Jim Villatoro MD;  Location: [facility] ENDOSCOPY;  Service: General;  Laterality: N/A;  ULCERATIVE COLITIS       Family History   Problem Relation Age of Onset   • Anxiety disorder Mother    • Asthma Sister    • Diabetes Sister    • Arthritis Maternal Grandmother    • Colon polyps Maternal Grandmother    • Cancer Maternal Grandfather    • Heart disease Maternal Grandfather    • Prostate cancer Paternal Grandfather    • Stroke Other    • Heart disease Other    • Kidney disease Other     • Diabetes Other    • Rectal cancer Other    • Colon cancer Other        Social History     Tobacco Use   • Smoking status: Never   • Smokeless tobacco: Never   Vaping Use   • Vaping status: Never Used   Substance Use Topics   • Alcohol use: Never   • Drug use: Never       Review of Systems  All systems were reviewed and negative except for:   Review of Systems   Constitutional: Negative for chills, fever and unexpected weight loss.   HENT: Negative for congestion, nosebleeds and voice change.    Eyes: Negative for blurred vision, double vision and discharge.   Respiratory: Negative for apnea, chest tightness and shortness of breath.    Cardiovascular: Negative for chest pain and leg swelling.   Gastrointestinal:        See HPI   Endocrine: Negative for cold intolerance and heat intolerance.   Genitourinary: Negative for dysuria, hematuria and urgency.   Musculoskeletal: Negative for back pain, joint swelling and neck pain.   Skin: Negative for color change and dry skin.   Neurological: Negative for dizziness and confusion.   Hematological: Negative for adenopathy.   Psychiatric/Behavioral: Negative for agitation and behavioral problems.     MEDS:  Prior to Admission medications    Medication Sig Start Date End Date Taking? Authorizing Provider   cyclobenzaprine (FLEXERIL) 5 MG tablet Take 1 tablet by mouth 2 (Two) Times a Day As Needed for Muscle Spasms. 7/11/24  Yes Demi Fernandez MD   ferrous sulfate (FeroSul) 325 (65 FE) MG tablet Take 1 tablet by mouth 2 (Two) Times a Day. 7/11/24  Yes Demi Fernandez MD   hydrOXYzine (ATARAX) 25 MG tablet Take 1 tablet by mouth 2 (Two) Times a Day. 7/11/24  Yes Demi Fernandez MD   Lactobacillus Acid-Pectin (Acidophilus/Pectin) capsule Take 1 capsule by mouth 2 (Two) Times a Day. 7/11/24  Yes Adrian Jessica APRN   levothyroxine (Synthroid) 100 MCG tablet Take 1 tablet by mouth Daily. 7/11/24  Yes Demi Fernandez MD   loperamide (IMODIUM) 2 MG capsule Take 1  capsule by mouth 2 (Two) Times a Day.  Patient taking differently: Take 1 capsule by mouth 2 (Two) Times a Day. 2 capsules twice daily 7/11/24  Yes Demi Fernandez MD   metroNIDAZOLE (FLAGYL) 250 MG tablet Take 1 tablet by mouth Every Morning. 7/11/24  Yes Demi Fernandez MD   NON FORMULARY Pain medicated lotion with Gabapentin. Patient uses for Chronic pain/Arthritis   Yes ProviderObdulia MD   sertraline (ZOLOFT) 50 MG tablet Take 1 tablet by mouth Daily.  Patient taking differently: Take 70 mg by mouth Daily. 7/11/24  Yes Demi Fernandez MD   ursodiol (ACTIGALL) 300 MG capsule Take 2 capsules by mouth 2 (Two) Times a Day. 7/11/24  Yes Demi Fernadnez MD   Incontinence Supply Disposable (Comfort Protect Adult Diaper/M) misc 1 each 5 (Five) Times a Day As Needed (for incontinence).  Patient not taking: Reported on 8/15/2024 9/13/22   Adrian Jessica APRN   Lactobacillus (Acidophilus Probiotic) capsule Take 1 capsule by mouth Every 12 (Twelve) Hours.  Patient not taking: Reported on 8/15/2024 7/11/24   ProviderObdulia MD        Allergies:  Gluten meal and Latex    Objective    Vital Signs   Resp:  [14] 14    Physical Exam:     General Appearance:    Alert, cooperative, in no acute distress   Head:    Normocephalic, without obvious abnormality, atraumatic   Eyes:          Conjunctivae and sclerae normal, no icterus,     Ears:    Ears appear intact with no abnormalities noted   Throat:   No oral lesions, no thrush, oral mucosa moist   Neck:   No adenopathy, supple, trachea midline, no thyromegaly   Back:     No kyphosis present, no scoliosis present, no skin lesions,      erythema or scars, no tenderness to percussion or                   palpation,   range of motion normal   Lungs:     Clear to auscultation,respirations regular, even and                  unlabored    Heart:    Regular rhythm and normal rate, normal S1 and S2, no            murmur, no gallop, no rub, no click   Chest Wall:     "No abnormalities observed   Abdomen:     Normal bowel sounds, no masses, no organomegaly, soft        non-tender, non-distended, no guarding, no rebound                tenderness   Rectal:   Left anterior external os concerning for recurrent anal fistula   Extremities:   Moves all extremities well, no edema, no cyanosis, no             redness   Pulses:   Pulses palpable and equal bilaterally   Skin:   No bleeding, bruising or rash   Lymph nodes:   No palpable adenopathy   Neurologic:   A/o x 4 with no deficits       Results Review:   {Results Review:53847::\"I reviewed the patient's new clinical results.\"    LABS/IMAGING:  Results for orders placed or performed in visit on 12/04/23   Hepatitis B Core Antibody, Total    Specimen: Blood   Result Value Ref Range    Hep B Core Total Ab Negative Negative   QuantiFERON TB Gold    Specimen: Blood   Result Value Ref Range    QuantiFERON Incubation Incubation performed.     QuantiFERON Criteria Comment     QUANTIFERON TB1 AG VALUE 0.00 IU/mL    QUANTIFERON TB2 AG VALUE 0.00 IU/mL    QuantiFERON Nil Value 0.00 IU/mL    QuantiFERON Mitogen Value >10.00 IU/mL    QUANTIFERON-TB GOLD PLUS Negative Negative        Result Review:     Assessment & Plan    History of ulcerative colitis status post total proctocolectomy with J-pouch performed at [facility].  Since that time she has developed perirectal abscess with seton drain placement July 2021 by Dr. Gardner.  She is on chronic Flagyl for pouchitis.  She has a history of sclerosing cholangitis.  She is thought possibly to have Crohn's disease.     Status post flexible pouchoscopy 6/2/2023 with left anterior transsphincteric fistula noted with noncutting seton drain in place.  No evidence of infection.  No inflammation of the distal ileum.  Minimal inflammation of the pouch.  No stricture.  Biopsies of the ileum and small bowel pouch with no activity, no granulomas, no dysplasia    Status post exam under anesthesia with " ligation of intersphincteric fistula tract 6/30/2023.  Pathology with benign anal mucosa with fibrosis and inflammation    Left anterior external os concerning for recurrent anal fissure    Discussion with patient.  We discussed proceeding with exam under anesthesia with fistulotomy, seton drain placement.  Procedure and recovery discussed.  Benefits and alternatives discussed.  Risk procedure including risk of anesthesia, bleeding, infection, recurrence, need for more surgery, pain discussed.  We also discussed referral to a larger center such as [facility], [facility], [facility].  All of their questions were answered.  They will think about it and let me know.  I do not recommend antibiotics at this time with the area draining and no evidence of abscess.  No bowel prep if proceed with surgery.  We discussed the possibility of MRI although I do not think the patient would tolerate that procedure very well and I explained to them that would not treat the fistula but only give more information.  Thank you for the consult.              This document has been electronically signed by Jim Villatoro MD  August 16, 2024 12:51 EDT

## 2024-08-16 NOTE — PROGRESS NOTES
General Surgery/Colorectal Surgery Note    Patient Name:  Claudia Wilkins  YOB: 1995  [id number]    Referring Provider: No ref. provider found      Patient Care Team:  Demi Fernandez MD as PCP - General (Internal Medicine)    Chief complaint follow-up    Subjective .     History of present illness:    History of ulcerative colitis status post total proctocolectomy with J-pouch performed at [facility].  Since that time she has developed perirectal abscess with seton drain placement July 2021 by Dr. Gardner.  She is on chronic Flagyl for pouchitis.  She has a history of sclerosing cholangitis.  She is thought possibly to have Crohn's disease.     Status post flexible pouchoscopy 6/2/2023 with left anterior transsphincteric fistula noted with noncutting seton drain in place.  No evidence of infection.  No inflammation of the distal ileum.  Minimal inflammation of the pouch.  No stricture.  Biopsies of the ileum and small bowel pouch with no activity, no granulomas, no dysplasia    Status post exam under anesthesia with ligation of intersphincteric fistula tract 6/30/2023.  Pathology with benign anal mucosa with fibrosis and inflammation    She comes in for evaluation of bloodstained drainage from the site of the previous fistula for 2 days.  Associated pain.  No fever.  No current treatment for Crohn's disease.  No blood thinner use.  No changes in health or medications otherwise since last seen.  No imaging.      History:  Past Medical History:   Diagnosis Date    Anemia     Anxiety     AR (allergic rhinitis)     Arthritis     Celiac disease     CFS (chronic fatigue syndrome)     Chronic liver disease     Chronic pain     Crohn's disease     Depression     Fibromyalgia     Fibromyalgia, primary     Gastric ulcer     H/O psychiatric care     Hemorrhoids     Hypermobility syndrome     Hypothyroidism     IBS (irritable bowel syndrome)     Insomnia     Lactose intolerance     Moderate  episode of recurrent major depressive disorder 01/29/2018    CONTINUE CURRENT MEDS     Nasal crusting     Osteopenia     Pancreatitis     Panic attacks     Pouchitis     Primary sclerosing cholangitis     PTSD (post-traumatic stress disorder)     RA (rheumatoid arthritis)     Recurrent epistaxis     Scoliosis     Ulcerative colitis        Past Surgical History:   Procedure Laterality Date    ANAL FISSURECTOMY/FISTULECTOMY N/A 6/30/2023    Procedure: ANAL FISTULA REPAIR ligation intersphincteric tract;  Surgeon: Jim Villatoro MD;  [place] [facility] [place] [facility] [place]  Service: General;  Laterality: N/A;    COLECTOMY TOTAL  2006    COLECTOMY W/ ILEOANAI J POUCH & SMALL BOWEL PULL THROUGH     COLON SURGERY      COLONOSCOPY      ENDOSCOPY      INCISION AND DRAINAGE PERIRECTAL ABSCESS      SIGMOIDOSCOPY N/A 6/2/2023    Procedure: POUCHOSCOPY WITH BIOPSIES;  Surgeon: Jim Villatoro MD;  Location: [facility] ENDOSCOPY;  Service: General;  Laterality: N/A;  ULCERATIVE COLITIS       Family History   Problem Relation Age of Onset    Anxiety disorder Mother     Asthma Sister     Diabetes Sister     Arthritis Maternal Grandmother     Colon polyps Maternal Grandmother     Cancer Maternal Grandfather     Heart disease Maternal Grandfather     Prostate cancer Paternal Grandfather     Stroke Other     Heart disease Other     Kidney disease Other     Diabetes Other     Rectal cancer Other     Colon cancer Other        Social History     Tobacco Use    Smoking status: Never    Smokeless tobacco: Never   Vaping Use    Vaping status: Never Used   Substance Use Topics    Alcohol use: Never    Drug use: Never       Review of Systems  All systems were reviewed and negative except for:   Review of Systems   Constitutional: Negative for chills, fever and unexpected weight loss.   HENT: Negative for congestion, nosebleeds and voice change.    Eyes: Negative for blurred vision, double vision and discharge.   Respiratory: Negative for apnea,  chest tightness and shortness of breath.    Cardiovascular: Negative for chest pain and leg swelling.   Gastrointestinal:        See HPI   Endocrine: Negative for cold intolerance and heat intolerance.   Genitourinary: Negative for dysuria, hematuria and urgency.   Musculoskeletal: Negative for back pain, joint swelling and neck pain.   Skin: Negative for color change and dry skin.   Neurological: Negative for dizziness and confusion.   Hematological: Negative for adenopathy.   Psychiatric/Behavioral: Negative for agitation and behavioral problems.     MEDS:  Prior to Admission medications    Medication Sig Start Date End Date Taking? Authorizing Provider   cyclobenzaprine (FLEXERIL) 5 MG tablet Take 1 tablet by mouth 2 (Two) Times a Day As Needed for Muscle Spasms. 7/11/24  Yes Demi Fernandez MD   ferrous sulfate (FeroSul) 325 (65 FE) MG tablet Take 1 tablet by mouth 2 (Two) Times a Day. 7/11/24  Yes Demi Fernandez MD   hydrOXYzine (ATARAX) 25 MG tablet Take 1 tablet by mouth 2 (Two) Times a Day. 7/11/24  Yes Demi Fernandez MD   Lactobacillus Acid-Pectin (Acidophilus/Pectin) capsule Take 1 capsule by mouth 2 (Two) Times a Day. 7/11/24  Yes Adrian Jessica APRN   levothyroxine (Synthroid) 100 MCG tablet Take 1 tablet by mouth Daily. 7/11/24  Yes Demi Fernandez MD   loperamide (IMODIUM) 2 MG capsule Take 1 capsule by mouth 2 (Two) Times a Day.  Patient taking differently: Take 1 capsule by mouth 2 (Two) Times a Day. 2 capsules twice daily 7/11/24  Yes Demi Fernandez MD   metroNIDAZOLE (FLAGYL) 250 MG tablet Take 1 tablet by mouth Every Morning. 7/11/24  Yes Demi Fernandez MD   NON FORMULARY Pain medicated lotion with Gabapentin. Patient uses for Chronic pain/Arthritis   Yes ProviderObdulia MD   sertraline (ZOLOFT) 50 MG tablet Take 1 tablet by mouth Daily.  Patient taking differently: Take 70 mg by mouth Daily. 7/11/24  Yes Demi Fernandez MD   ursodiol (ACTIGALL) 300 MG capsule  "Take 2 capsules by mouth 2 (Two) Times a Day. 7/11/24  Yes Demi Fernandez MD   Incontinence Supply Disposable (Comfort Protect Adult Diaper/M) misc 1 each 5 (Five) Times a Day As Needed (for incontinence).  Patient not taking: Reported on 8/15/2024 9/13/22   Adrian Jessica APRN   Lactobacillus (Acidophilus Probiotic) capsule Take 1 capsule by mouth Every 12 (Twelve) Hours.  Patient not taking: Reported on 8/15/2024 7/11/24   Provider, MD Obdulia        Allergies:  Gluten meal and Latex    Objective     Vital Signs   Resp:  [14] 14    Physical Exam:     General Appearance:    Alert, cooperative, in no acute distress   Head:    Normocephalic, without obvious abnormality, atraumatic   Eyes:          Conjunctivae and sclerae normal, no icterus,     Ears:    Ears appear intact with no abnormalities noted   Throat:   No oral lesions, no thrush, oral mucosa moist   Neck:   No adenopathy, supple, trachea midline, no thyromegaly   Back:     No kyphosis present, no scoliosis present, no skin lesions,      erythema or scars, no tenderness to percussion or                   palpation,   range of motion normal   Lungs:     Clear to auscultation,respirations regular, even and                  unlabored    Heart:    Regular rhythm and normal rate, normal S1 and S2, no            murmur, no gallop, no rub, no click   Chest Wall:    No abnormalities observed   Abdomen:     Normal bowel sounds, no masses, no organomegaly, soft        non-tender, non-distended, no guarding, no rebound                tenderness   Rectal:   Left anterior external os concerning for recurrent anal fistula   Extremities:   Moves all extremities well, no edema, no cyanosis, no             redness   Pulses:   Pulses palpable and equal bilaterally   Skin:   No bleeding, bruising or rash   Lymph nodes:   No palpable adenopathy   Neurologic:   A/o x 4 with no deficits       Results Review:   {Results Review:21675::\"I reviewed the patient's new clinical " "results.\"    LABS/IMAGING:  Results for orders placed or performed in visit on 12/04/23   Hepatitis B Core Antibody, Total    Specimen: Blood   Result Value Ref Range    Hep B Core Total Ab Negative Negative   QuantiFERON TB Gold    Specimen: Blood   Result Value Ref Range    QuantiFERON Incubation Incubation performed.     QuantiFERON Criteria Comment     QUANTIFERON TB1 AG VALUE 0.00 IU/mL    QUANTIFERON TB2 AG VALUE 0.00 IU/mL    QuantiFERON Nil Value 0.00 IU/mL    QuantiFERON Mitogen Value >10.00 IU/mL    QUANTIFERON-TB GOLD PLUS Negative Negative        Result Review :     Assessment & Plan     History of ulcerative colitis status post total proctocolectomy with J-pouch performed at [facility].  Since that time she has developed perirectal abscess with seton drain placement July 2021 by Dr. Gardner.  She is on chronic Flagyl for pouchitis.  She has a history of sclerosing cholangitis.  She is thought possibly to have Crohn's disease.     Status post flexible pouchoscopy 6/2/2023 with left anterior transsphincteric fistula noted with noncutting seton drain in place.  No evidence of infection.  No inflammation of the distal ileum.  Minimal inflammation of the pouch.  No stricture.  Biopsies of the ileum and small bowel pouch with no activity, no granulomas, no dysplasia    Status post exam under anesthesia with ligation of intersphincteric fistula tract 6/30/2023.  Pathology with benign anal mucosa with fibrosis and inflammation    Left anterior external os concerning for recurrent anal fissure    Discussion with patient.  We discussed proceeding with exam under anesthesia with fistulotomy, seton drain placement.  Procedure and recovery discussed.  Benefits and alternatives discussed.  Risk procedure including risk of anesthesia, bleeding, infection, recurrence, need for more surgery, pain discussed.  We also discussed referral to a larger center such as [facility], [facility]" [facility].  All of their questions were answered.  They will think about it and let me know.  I do not recommend antibiotics at this time with the area draining and no evidence of abscess.  No bowel prep if proceed with surgery.  We discussed the possibility of MRI although I do not think the patient would tolerate that procedure very well and I explained to them that would not treat the fistula but only give more information.  Thank you for the consult.              This document has been electronically signed by Jim Villatoro MD  August 16, 2024 12:51 EDT

## 2024-08-16 NOTE — H&P (VIEW-ONLY)
General Surgery/Colorectal Surgery Note    Patient Name:  Claudia Wilkins  YOB: 1995  [id number]    Referring Provider: No ref. provider found      Patient Care Team:  Demi Fernandez MD as PCP - General (Internal Medicine)    Chief complaint follow-up    Subjective .     History of present illness:    History of ulcerative colitis status post total proctocolectomy with J-pouch performed at [facility].  Since that time she has developed perirectal abscess with seton drain placement July 2021 by Dr. Gardner.  She is on chronic Flagyl for pouchitis.  She has a history of sclerosing cholangitis.  She is thought possibly to have Crohn's disease.     Status post flexible pouchoscopy 6/2/2023 with left anterior transsphincteric fistula noted with noncutting seton drain in place.  No evidence of infection.  No inflammation of the distal ileum.  Minimal inflammation of the pouch.  No stricture.  Biopsies of the ileum and small bowel pouch with no activity, no granulomas, no dysplasia    Status post exam under anesthesia with ligation of intersphincteric fistula tract 6/30/2023.  Pathology with benign anal mucosa with fibrosis and inflammation    She comes in for evaluation of bloodstained drainage from the site of the previous fistula for 2 days.  Associated pain.  No fever.  No current treatment for Crohn's disease.  No blood thinner use.  No changes in health or medications otherwise since last seen.  No imaging.      History:  Past Medical History:   Diagnosis Date    Anemia     Anxiety     AR (allergic rhinitis)     Arthritis     Celiac disease     CFS (chronic fatigue syndrome)     Chronic liver disease     Chronic pain     Crohn's disease     Depression     Fibromyalgia     Fibromyalgia, primary     Gastric ulcer     H/O psychiatric care     Hemorrhoids     Hypermobility syndrome     Hypothyroidism     IBS (irritable bowel syndrome)     Insomnia     Lactose intolerance     Moderate  episode of recurrent major depressive disorder 01/29/2018    CONTINUE CURRENT MEDS     Nasal crusting     Osteopenia     Pancreatitis     Panic attacks     Pouchitis     Primary sclerosing cholangitis     PTSD (post-traumatic stress disorder)     RA (rheumatoid arthritis)     Recurrent epistaxis     Scoliosis     Ulcerative colitis        Past Surgical History:   Procedure Laterality Date    ANAL FISSURECTOMY/FISTULECTOMY N/A 6/30/2023    Procedure: ANAL FISTULA REPAIR ligation intersphincteric tract;  Surgeon: Jim Villatoro MD;  Location: [facility] OR [facility];  Service: General;  Laterality: N/A;    COLECTOMY TOTAL  2006    COLECTOMY W/ ILEOANAI J POUCH & SMALL BOWEL PULL THROUGH     COLON SURGERY      COLONOSCOPY      ENDOSCOPY      INCISION AND DRAINAGE PERIRECTAL ABSCESS      SIGMOIDOSCOPY N/A 6/2/2023    Procedure: POUCHOSCOPY WITH BIOPSIES;  Surgeon: Jim Villatoro MD;  Location: [facility] ENDOSCOPY;  Service: General;  Laterality: N/A;  ULCERATIVE COLITIS       Family History   Problem Relation Age of Onset    Anxiety disorder Mother     Asthma Sister     Diabetes Sister     Arthritis Maternal Grandmother     Colon polyps Maternal Grandmother     Cancer Maternal Grandfather     Heart disease Maternal Grandfather     Prostate cancer Paternal Grandfather     Stroke Other     Heart disease Other     Kidney disease Other     Diabetes Other     Rectal cancer Other     Colon cancer Other        Social History     Tobacco Use    Smoking status: Never    Smokeless tobacco: Never   Vaping Use    Vaping status: Never Used   Substance Use Topics    Alcohol use: Never    Drug use: Never       Review of Systems  All systems were reviewed and negative except for:   Review of Systems   Constitutional: Negative for chills, fever and unexpected weight loss.   HENT: Negative for congestion, nosebleeds and voice change.    Eyes: Negative for blurred vision, double vision and discharge.   Respiratory: Negative for apnea,  chest tightness and shortness of breath.    Cardiovascular: Negative for chest pain and leg swelling.   Gastrointestinal:        See HPI   Endocrine: Negative for cold intolerance and heat intolerance.   Genitourinary: Negative for dysuria, hematuria and urgency.   Musculoskeletal: Negative for back pain, joint swelling and neck pain.   Skin: Negative for color change and dry skin.   Neurological: Negative for dizziness and confusion.   Hematological: Negative for adenopathy.   Psychiatric/Behavioral: Negative for agitation and behavioral problems.     MEDS:  Prior to Admission medications    Medication Sig Start Date End Date Taking? Authorizing Provider   cyclobenzaprine (FLEXERIL) 5 MG tablet Take 1 tablet by mouth 2 (Two) Times a Day As Needed for Muscle Spasms. 7/11/24  Yes Demi Fernandez MD   ferrous sulfate (FeroSul) 325 (65 FE) MG tablet Take 1 tablet by mouth 2 (Two) Times a Day. 7/11/24  Yes Demi Fernandez MD   hydrOXYzine (ATARAX) 25 MG tablet Take 1 tablet by mouth 2 (Two) Times a Day. 7/11/24  Yes Demi Fernandez MD   Lactobacillus Acid-Pectin (Acidophilus/Pectin) capsule Take 1 capsule by mouth 2 (Two) Times a Day. 7/11/24  Yes Adrian Jessica APRN   levothyroxine (Synthroid) 100 MCG tablet Take 1 tablet by mouth Daily. 7/11/24  Yes Demi Fernandez MD   loperamide (IMODIUM) 2 MG capsule Take 1 capsule by mouth 2 (Two) Times a Day.  Patient taking differently: Take 1 capsule by mouth 2 (Two) Times a Day. 2 capsules twice daily 7/11/24  Yes Demi Fernandez MD   metroNIDAZOLE (FLAGYL) 250 MG tablet Take 1 tablet by mouth Every Morning. 7/11/24  Yes Demi Fernandez MD   NON FORMULARY Pain medicated lotion with Gabapentin. Patient uses for Chronic pain/Arthritis   Yes ProviderObdulia MD   sertraline (ZOLOFT) 50 MG tablet Take 1 tablet by mouth Daily.  Patient taking differently: Take 70 mg by mouth Daily. 7/11/24  Yes Demi Fernandez MD   ursodiol (ACTIGALL) 300 MG capsule  "Take 2 capsules by mouth 2 (Two) Times a Day. 7/11/24  Yes Demi Fernandez MD   Incontinence Supply Disposable (Comfort Protect Adult Diaper/M) misc 1 each 5 (Five) Times a Day As Needed (for incontinence).  Patient not taking: Reported on 8/15/2024 9/13/22   Adrian Jessica APRN   Lactobacillus (Acidophilus Probiotic) capsule Take 1 capsule by mouth Every 12 (Twelve) Hours.  Patient not taking: Reported on 8/15/2024 7/11/24   Provider, MD Obdulia        Allergies:  Gluten meal and Latex    Objective     Vital Signs   Resp:  [14] 14    Physical Exam:     General Appearance:    Alert, cooperative, in no acute distress   Head:    Normocephalic, without obvious abnormality, atraumatic   Eyes:          Conjunctivae and sclerae normal, no icterus,     Ears:    Ears appear intact with no abnormalities noted   Throat:   No oral lesions, no thrush, oral mucosa moist   Neck:   No adenopathy, supple, trachea midline, no thyromegaly   Back:     No kyphosis present, no scoliosis present, no skin lesions,      erythema or scars, no tenderness to percussion or                   palpation,   range of motion normal   Lungs:     Clear to auscultation,respirations regular, even and                  unlabored    Heart:    Regular rhythm and normal rate, normal S1 and S2, no            murmur, no gallop, no rub, no click   Chest Wall:    No abnormalities observed   Abdomen:     Normal bowel sounds, no masses, no organomegaly, soft        non-tender, non-distended, no guarding, no rebound                tenderness   Rectal:   Left anterior external os concerning for recurrent anal fistula   Extremities:   Moves all extremities well, no edema, no cyanosis, no             redness   Pulses:   Pulses palpable and equal bilaterally   Skin:   No bleeding, bruising or rash   Lymph nodes:   No palpable adenopathy   Neurologic:   A/o x 4 with no deficits       Results Review:   {Results Review:[id number]::\"I reviewed the patient's new clinical " "results.\"    LABS/IMAGING:  Results for orders placed or performed in visit on 12/04/23   Hepatitis B Core Antibody, Total    Specimen: Blood   Result Value Ref Range    Hep B Core Total Ab Negative Negative   QuantiFERON TB Gold    Specimen: Blood   Result Value Ref Range    QuantiFERON Incubation Incubation performed.     QuantiFERON Criteria Comment     QUANTIFERON TB1 AG VALUE 0.00 IU/mL    QUANTIFERON TB2 AG VALUE 0.00 IU/mL    QuantiFERON Nil Value 0.00 IU/mL    QuantiFERON Mitogen Value >10.00 IU/mL    QUANTIFERON-TB GOLD PLUS Negative Negative        Result Review :     Assessment & Plan     History of ulcerative colitis status post total proctocolectomy with J-pouch performed at [facility].  Since that time she has developed perirectal abscess with seton drain placement July 2021 by Dr. Gardner.  She is on chronic Flagyl for pouchitis.  She has a history of sclerosing cholangitis.  She is thought possibly to have Crohn's disease.     Status post flexible pouchoscopy 6/2/2023 with left anterior transsphincteric fistula noted with noncutting seton drain in place.  No evidence of infection.  No inflammation of the distal ileum.  Minimal inflammation of the pouch.  No stricture.  Biopsies of the ileum and small bowel pouch with no activity, no granulomas, no dysplasia    Status post exam under anesthesia with ligation of intersphincteric fistula tract 6/30/2023.  Pathology with benign anal mucosa with fibrosis and inflammation    Left anterior external os concerning for recurrent anal fissure    Discussion with patient.  We discussed proceeding with exam under anesthesia with fistulotomy, seton drain placement.  Procedure and recovery discussed.  Benefits and alternatives discussed.  Risk procedure including risk of anesthesia, bleeding, infection, recurrence, need for more surgery, pain discussed.  We also discussed referral to a larger center such as [facility], [facility]" [facility].  All of their questions were answered.  They will think about it and let me know.  I do not recommend antibiotics at this time with the area draining and no evidence of abscess.  No bowel prep if proceed with surgery.  We discussed the possibility of MRI although I do not think the patient would tolerate that procedure very well and I explained to them that would not treat the fistula but only give more information.  Thank you for the consult.              This document has been electronically signed by Jim Villatoro MD  August 16, 2024 12:51 EDT

## 2024-08-21 ENCOUNTER — TELEPHONE (OUTPATIENT)
Dept: SURGERY | Facility: CLINIC | Age: 29
End: 2024-08-21
Payer: MEDICARE

## 2024-08-21 NOTE — TELEPHONE ENCOUNTER
PT WAS SEEN BY DR LOGAN ON 8/15 AND SURGERY WAS DISCUSSED. MOM CALLED AND WOULD LIKE TO GO AHEAD AND GET SCHEDULED WITH HIM FOR THE EUA,FISTULOTOMY WITH DRAIN PLACEMENT. CALL HER AT YOUR CONVENIENCE.

## 2024-08-22 ENCOUNTER — TELEPHONE (OUTPATIENT)
Dept: SURGERY | Facility: CLINIC | Age: 29
End: 2024-08-22
Payer: MEDICARE

## 2024-08-22 ENCOUNTER — PREP FOR SURGERY (OUTPATIENT)
Dept: OTHER | Facility: HOSPITAL | Age: 29
End: 2024-08-22
Payer: MEDICARE

## 2024-08-22 DIAGNOSIS — K60.3 ANAL FISTULA: Primary | ICD-10-CM

## 2024-08-22 RX ORDER — SODIUM CHLORIDE 0.9 % (FLUSH) 0.9 %
10 SYRINGE (ML) INJECTION EVERY 12 HOURS SCHEDULED
OUTPATIENT
Start: 2024-08-22

## 2024-08-22 RX ORDER — SODIUM CHLORIDE, SODIUM LACTATE, POTASSIUM CHLORIDE, CALCIUM CHLORIDE 600; 310; 30; 20 MG/100ML; MG/100ML; MG/100ML; MG/100ML
50 INJECTION, SOLUTION INTRAVENOUS CONTINUOUS
OUTPATIENT
Start: 2024-08-22

## 2024-08-22 RX ORDER — SODIUM CHLORIDE 9 MG/ML
40 INJECTION, SOLUTION INTRAVENOUS AS NEEDED
OUTPATIENT
Start: 2024-08-22

## 2024-08-22 RX ORDER — SODIUM CHLORIDE 0.9 % (FLUSH) 0.9 %
10 SYRINGE (ML) INJECTION AS NEEDED
OUTPATIENT
Start: 2024-08-22

## 2024-08-23 NOTE — TELEPHONE ENCOUNTER
PATIENT IS SCHEDULED FOR 09-06-24, SCHEDULED WITH MALINDA IN SURGERY SCHEDULING. PATIENT MOM AWARE OF DATE/TIME AND VOICED UNDERSTANDING.

## 2024-09-04 NOTE — PRE-PROCEDURE INSTRUCTIONS
ATIENT INSTRUCTED TO BE:    - NOTHING TO EAT AFTER MIDNIGHT OR CHEW, EXCEPT CAN HAVE CLEAR LIQUIDS 2 HOURS PRIOR TO SURGERY ARRIVAL TIME , NO MORE THAN 8 OZ. (NOTHING RED)     - TO HOLD ALL VITAMINS, SUPPLEMENTS, NSAIDS FOR ONE WEEK PRIOR TO THEIR SURGICAL PROCEDURE    - DO NOT TAKE ______________________ 7 DAYS PRIOR TO PROCEDURE PER ANESTHESIA RECOMMENDATIONS/INSTRUCTIONS     - INSTRUCTED PT TO USE SURGICAL SOAP 1 TIME THE NIGHT PRIOR TO SURGERY ___________ OR THE AM OF SURGERY _____________   USE THE SOAP FROM NECK TO TOES, AVOID THEIR FACE, HAIR, AND PRIVATE PARTS. IF USE THE SOAP THE NIGHT PRIOR TO SURGERY, CHANGE BED LINENS AND NO PETS IN THE BED.     INSTRUCTED NO LOTIONS, JEWELRY, PIERCINGS,  NAIL POLISH, OR DEODORANT DAY OF SURGERY    - IF DIABETIC, CHECK BLOOD GLUCOSE IF LESS THAN 70 OR HAVING SYMPTOMS CALL THE PREOP AREA FOR INSTRUCTIONS ON AM OF SURGERY ([phone])    -INSTRUCTED TO TAKE THE FOLLOWING MEDICATIONS THE DAY OF SURGERY WITH SIPS OF WATER: FLEXERIL ATARAX, FLAGYL, ZOLOFT, PROBIOTIC, LEVOTHYROXINE, IMMODIUM, ACTIGAL          - DO NOT BRING ANY MEDICATIONS WITH YOU TO THE HOSPITAL THE DAY OF SURGERY, EXCEPT IF USE INHALERS. BRING INHALERS DAY OF SURGERY       - BRING CPAP OR BIPAP TO THE HOSPITAL ONLY IF YOU ARE SPENDING THE NIGHT    - DO NOT SMOKE OR VAPE 24 HOURS PRIOR TO PROCEDURE PER ANESTHESIA REQUEST     -MAKE SURE YOU HAVE A RIDE HOME OR SOMEONE TO STAY WITH YOU THE DAY OF THE PROCEDURE AFTER YOU GO HOME     - FOLLOW ANY OTHER INSTRUCTIONS GIVEN TO YOU BY YOUR SURGEON'S OFFICE.     - DAY OF SURGERY __COME TO [facility]/ [facility], FIRST FLOOR. CHECK IN AT THE DESK FOR REGISTRATION/SURGERY    - YOU WILL RECEIVE A PHONE CALL THE DAY PRIOR TO SURGERY BETWEEN 1PM AND 4 PM WITH ARRIVAL TIME, IF YOUR SURGERY IS ON A MONDAY YOU WILL RECEIVE A CALL THE FRIDAY PRIOR TO SURGERY DATE    - BRING CASH OR CREDIT CARD FOR COPAYMENT OF MEDICATIONS AFTER SURGERY IF YOU USE THE HOSPITAL PHARMACY  (MEDS TO BED)    - PREADMISSION TESTING NURSE LEVY GOLDMAN [phone] IF HAVE ANY QUESTIONS     -PATIENT PROVIDED THE NUMBER FOR PREOP SURGICAL DEPT IF HAD QUESTIONS AFTER HOURS PRIOR TO SURGERY ([phone]).  INFORMED PT IF NO ANSWER, LEAVE A MESSAGE AND SOMEONE WILL RETURN THEIR CALL       PATIENT VERBALIZED UNDERSTANDING

## 2024-09-05 ENCOUNTER — ANESTHESIA EVENT (OUTPATIENT)
Dept: PERIOP | Facility: HOSPITAL | Age: 29
End: 2024-09-05
Payer: MEDICARE

## 2024-09-06 ENCOUNTER — HOSPITAL ENCOUNTER (OUTPATIENT)
Facility: HOSPITAL | Age: 29
Setting detail: HOSPITAL OUTPATIENT SURGERY
Discharge: HOME | End: 2024-09-06
Attending: SURGERY | Admitting: SURGERY
Payer: MEDICARE

## 2024-09-06 ENCOUNTER — ANESTHESIA (OUTPATIENT)
Dept: PERIOP | Facility: HOSPITAL | Age: 29
End: 2024-09-06
Payer: MEDICARE

## 2024-09-06 VITALS
TEMPERATURE: 98.2 F | HEART RATE: 118 BPM | WEIGHT: 114.86 LBS | HEIGHT: 62 IN | DIASTOLIC BLOOD PRESSURE: 67 MMHG | RESPIRATION RATE: 22 BRPM | OXYGEN SATURATION: 93 % | SYSTOLIC BLOOD PRESSURE: 112 MMHG | BODY MASS INDEX: 21.14 KG/M2

## 2024-09-06 DIAGNOSIS — K60.3 ANAL FISTULA: ICD-10-CM

## 2024-09-06 LAB — B-HCG UR QL: NEGATIVE

## 2024-09-06 PROCEDURE — 25010000002 SUGAMMADEX 200 MG/2ML SOLUTION: Performed by: NURSE ANESTHETIST, CERTIFIED REGISTERED

## 2024-09-06 PROCEDURE — 25010000002 CEFAZOLIN PER 500 MG: Performed by: SURGERY

## 2024-09-06 PROCEDURE — 25010000002 PROPOFOL 200 MG/20ML EMULSION: Performed by: NURSE ANESTHETIST, CERTIFIED REGISTERED

## 2024-09-06 PROCEDURE — 25010000002 DEXAMETHASONE PER 1 MG: Performed by: NURSE ANESTHETIST, CERTIFIED REGISTERED

## 2024-09-06 PROCEDURE — 25010000002 FENTANYL CITRATE (PF) 50 MCG/ML SOLUTION: Performed by: NURSE ANESTHETIST, CERTIFIED REGISTERED

## 2024-09-06 PROCEDURE — 81025 URINE PREGNANCY TEST: CPT | Performed by: SURGERY

## 2024-09-06 PROCEDURE — 25010000002 ONDANSETRON PER 1 MG: Performed by: NURSE ANESTHETIST, CERTIFIED REGISTERED

## 2024-09-06 PROCEDURE — 25810000003 LACTATED RINGERS PER 1000 ML: Performed by: ANESTHESIOLOGY

## 2024-09-06 PROCEDURE — 25010000002 ESMOLOL 100 MG/10ML SOLUTION: Performed by: NURSE ANESTHETIST, CERTIFIED REGISTERED

## 2024-09-06 PROCEDURE — 25010000002 MEPERIDINE PER 100 MG: Performed by: NURSE ANESTHETIST, CERTIFIED REGISTERED

## 2024-09-06 PROCEDURE — 25010000002 MIDAZOLAM PER 1MG: Performed by: ANESTHESIOLOGY

## 2024-09-06 PROCEDURE — 46270 REMOVE ANAL FIST SUBQ: CPT | Performed by: SURGERY

## 2024-09-06 RX ORDER — MIDAZOLAM HYDROCHLORIDE 2 MG/2ML
2 INJECTION, SOLUTION INTRAMUSCULAR; INTRAVENOUS ONCE
Status: COMPLETED | OUTPATIENT
Start: 2024-09-06 | End: 2024-09-06

## 2024-09-06 RX ORDER — ONDANSETRON 2 MG/ML
INJECTION INTRAMUSCULAR; INTRAVENOUS AS NEEDED
Status: DISCONTINUED | OUTPATIENT
Start: 2024-09-06 | End: 2024-09-06 | Stop reason: SURG

## 2024-09-06 RX ORDER — FENTANYL CITRATE 50 UG/ML
INJECTION, SOLUTION INTRAMUSCULAR; INTRAVENOUS AS NEEDED
Status: DISCONTINUED | OUTPATIENT
Start: 2024-09-06 | End: 2024-09-06 | Stop reason: SURG

## 2024-09-06 RX ORDER — SODIUM CHLORIDE, SODIUM LACTATE, POTASSIUM CHLORIDE, CALCIUM CHLORIDE 600; 310; 30; 20 MG/100ML; MG/100ML; MG/100ML; MG/100ML
9 INJECTION, SOLUTION INTRAVENOUS CONTINUOUS PRN
Status: DISCONTINUED | OUTPATIENT
Start: 2024-09-06 | End: 2024-09-06 | Stop reason: HOSPADM

## 2024-09-06 RX ORDER — SODIUM CHLORIDE 0.9 % (FLUSH) 0.9 %
10 SYRINGE (ML) INJECTION AS NEEDED
Status: DISCONTINUED | OUTPATIENT
Start: 2024-09-06 | End: 2024-09-06 | Stop reason: HOSPADM

## 2024-09-06 RX ORDER — SODIUM CHLORIDE 9 MG/ML
40 INJECTION, SOLUTION INTRAVENOUS AS NEEDED
Status: DISCONTINUED | OUTPATIENT
Start: 2024-09-06 | End: 2024-09-06 | Stop reason: HOSPADM

## 2024-09-06 RX ORDER — PROMETHAZINE HYDROCHLORIDE 12.5 MG/1
25 TABLET ORAL ONCE AS NEEDED
Status: DISCONTINUED | OUTPATIENT
Start: 2024-09-06 | End: 2024-09-06 | Stop reason: HOSPADM

## 2024-09-06 RX ORDER — SODIUM CHLORIDE, SODIUM LACTATE, POTASSIUM CHLORIDE, CALCIUM CHLORIDE 600; 310; 30; 20 MG/100ML; MG/100ML; MG/100ML; MG/100ML
50 INJECTION, SOLUTION INTRAVENOUS CONTINUOUS
Status: DISCONTINUED | OUTPATIENT
Start: 2024-09-06 | End: 2024-09-06 | Stop reason: HOSPADM

## 2024-09-06 RX ORDER — BUPIVACAINE HYDROCHLORIDE AND EPINEPHRINE 2.5; 5 MG/ML; UG/ML
INJECTION, SOLUTION EPIDURAL; INFILTRATION; INTRACAUDAL; PERINEURAL AS NEEDED
Status: DISCONTINUED | OUTPATIENT
Start: 2024-09-06 | End: 2024-09-06 | Stop reason: HOSPADM

## 2024-09-06 RX ORDER — DEXAMETHASONE SODIUM PHOSPHATE 4 MG/ML
INJECTION, SOLUTION INTRA-ARTICULAR; INTRALESIONAL; INTRAMUSCULAR; INTRAVENOUS; SOFT TISSUE AS NEEDED
Status: DISCONTINUED | OUTPATIENT
Start: 2024-09-06 | End: 2024-09-06 | Stop reason: SURG

## 2024-09-06 RX ORDER — ROCURONIUM BROMIDE 10 MG/ML
INJECTION, SOLUTION INTRAVENOUS AS NEEDED
Status: DISCONTINUED | OUTPATIENT
Start: 2024-09-06 | End: 2024-09-06 | Stop reason: SURG

## 2024-09-06 RX ORDER — OXYCODONE HYDROCHLORIDE 5 MG/1
5 TABLET ORAL
Status: COMPLETED | OUTPATIENT
Start: 2024-09-06 | End: 2024-09-06

## 2024-09-06 RX ORDER — ACETAMINOPHEN 500 MG
500 TABLET ORAL ONCE
Status: COMPLETED | OUTPATIENT
Start: 2024-09-06 | End: 2024-09-06

## 2024-09-06 RX ORDER — ESMOLOL HYDROCHLORIDE 10 MG/ML
INJECTION INTRAVENOUS AS NEEDED
Status: DISCONTINUED | OUTPATIENT
Start: 2024-09-06 | End: 2024-09-06 | Stop reason: SURG

## 2024-09-06 RX ORDER — DEXMEDETOMIDINE HYDROCHLORIDE 4 UG/ML
INJECTION, SOLUTION INTRAVENOUS AS NEEDED
Status: DISCONTINUED | OUTPATIENT
Start: 2024-09-06 | End: 2024-09-06 | Stop reason: SURG

## 2024-09-06 RX ORDER — PROMETHAZINE HYDROCHLORIDE 25 MG/1
25 SUPPOSITORY RECTAL ONCE AS NEEDED
Status: DISCONTINUED | OUTPATIENT
Start: 2024-09-06 | End: 2024-09-06 | Stop reason: HOSPADM

## 2024-09-06 RX ORDER — OXYCODONE AND ACETAMINOPHEN 5; 325 MG/1; MG/1
1 TABLET ORAL EVERY 6 HOURS PRN
Qty: 12 TABLET | Refills: 0 | Status: SHIPPED | OUTPATIENT
Start: 2024-09-06

## 2024-09-06 RX ORDER — ONDANSETRON 2 MG/ML
4 INJECTION INTRAMUSCULAR; INTRAVENOUS ONCE AS NEEDED
Status: DISCONTINUED | OUTPATIENT
Start: 2024-09-06 | End: 2024-09-06 | Stop reason: HOSPADM

## 2024-09-06 RX ORDER — LIDOCAINE HYDROCHLORIDE 20 MG/ML
INJECTION, SOLUTION EPIDURAL; INFILTRATION; INTRACAUDAL; PERINEURAL AS NEEDED
Status: DISCONTINUED | OUTPATIENT
Start: 2024-09-06 | End: 2024-09-06 | Stop reason: SURG

## 2024-09-06 RX ORDER — PROPOFOL 10 MG/ML
INJECTION, EMULSION INTRAVENOUS AS NEEDED
Status: DISCONTINUED | OUTPATIENT
Start: 2024-09-06 | End: 2024-09-06 | Stop reason: SURG

## 2024-09-06 RX ORDER — SODIUM CHLORIDE 0.9 % (FLUSH) 0.9 %
10 SYRINGE (ML) INJECTION EVERY 12 HOURS SCHEDULED
Status: DISCONTINUED | OUTPATIENT
Start: 2024-09-06 | End: 2024-09-06 | Stop reason: HOSPADM

## 2024-09-06 RX ORDER — MEPERIDINE HYDROCHLORIDE 25 MG/ML
12.5 INJECTION INTRAMUSCULAR; INTRAVENOUS; SUBCUTANEOUS
Status: DISCONTINUED | OUTPATIENT
Start: 2024-09-06 | End: 2024-09-06 | Stop reason: HOSPADM

## 2024-09-06 RX ADMIN — OXYCODONE HYDROCHLORIDE 5 MG: 5 TABLET ORAL at 13:40

## 2024-09-06 RX ADMIN — FENTANYL CITRATE 50 MCG: 50 INJECTION, SOLUTION INTRAMUSCULAR; INTRAVENOUS at 12:15

## 2024-09-06 RX ADMIN — DEXMEDETOMIDINE HYDROCHLORIDE IN 0.9% SODIUM CHLORIDE 12 MCG: 4 INJECTION INTRAVENOUS at 12:16

## 2024-09-06 RX ADMIN — DEXMEDETOMIDINE HYDROCHLORIDE IN 0.9% SODIUM CHLORIDE 8 MCG: 4 INJECTION INTRAVENOUS at 12:21

## 2024-09-06 RX ADMIN — ESMOLOL HYDROCHLORIDE 20 MG: 100 INJECTION, SOLUTION INTRAVENOUS at 12:35

## 2024-09-06 RX ADMIN — PROPOFOL 200 MG: 10 INJECTION, EMULSION INTRAVENOUS at 12:03

## 2024-09-06 RX ADMIN — ROCURONIUM BROMIDE 40 MG: 10 INJECTION, SOLUTION INTRAVENOUS at 12:03

## 2024-09-06 RX ADMIN — ESMOLOL HYDROCHLORIDE 20 MG: 100 INJECTION, SOLUTION INTRAVENOUS at 12:22

## 2024-09-06 RX ADMIN — OXYCODONE HYDROCHLORIDE 5 MG: 5 TABLET ORAL at 13:09

## 2024-09-06 RX ADMIN — ONDANSETRON 4 MG: 2 INJECTION INTRAMUSCULAR; INTRAVENOUS at 12:17

## 2024-09-06 RX ADMIN — DEXAMETHASONE SODIUM PHOSPHATE 4 MG: 4 INJECTION, SOLUTION INTRAMUSCULAR; INTRAVENOUS at 12:17

## 2024-09-06 RX ADMIN — SODIUM CHLORIDE, POTASSIUM CHLORIDE, SODIUM LACTATE AND CALCIUM CHLORIDE 9 ML/HR: 600; 310; 30; 20 INJECTION, SOLUTION INTRAVENOUS at 11:24

## 2024-09-06 RX ADMIN — LIDOCAINE HYDROCHLORIDE 60 MG: 20 INJECTION, SOLUTION EPIDURAL; INFILTRATION; INTRACAUDAL; PERINEURAL at 12:03

## 2024-09-06 RX ADMIN — SODIUM CHLORIDE 2000 MG: 9 INJECTION, SOLUTION INTRAVENOUS at 11:58

## 2024-09-06 RX ADMIN — MEPERIDINE HYDROCHLORIDE 12.5 MG: 25 INJECTION INTRAMUSCULAR; INTRAVENOUS; SUBCUTANEOUS at 13:12

## 2024-09-06 RX ADMIN — MIDAZOLAM HYDROCHLORIDE 2 MG: 1 INJECTION, SOLUTION INTRAMUSCULAR; INTRAVENOUS at 11:25

## 2024-09-06 RX ADMIN — ACETAMINOPHEN 500 MG: 500 TABLET ORAL at 11:25

## 2024-09-06 RX ADMIN — SUGAMMADEX 200 MG: 100 INJECTION, SOLUTION INTRAVENOUS at 12:41

## 2024-09-06 RX ADMIN — FENTANYL CITRATE 50 MCG: 50 INJECTION, SOLUTION INTRAMUSCULAR; INTRAVENOUS at 12:03

## 2024-09-06 NOTE — ANESTHESIA PREPROCEDURE EVALUATION
Anesthesia Evaluation     Patient summary reviewed and Nursing notes reviewed   no history of anesthetic complications:   NPO Solid Status: > 8 hours  NPO Liquid Status: > 2 hours           Airway   Mallampati: II  TM distance: >3 FB  Neck ROM: full  No difficulty expected  Dental      Pulmonary - negative pulmonary ROS and normal exam    breath sounds clear to auscultation  Cardiovascular - negative cardio ROS and normal exam  Exercise tolerance: good (4-7 METS)    Rhythm: regular  Rate: normal        Neuro/Psych  (+) psychiatric history Anxiety and PTSD  GI/Hepatic/Renal/Endo    (+) PUD, liver disease (primary sclerosing cholangitis), thyroid problem hypothyroidism    ROS Comment: Ulcerative colitis s/p colectomy    Musculoskeletal     (+) chronic pain, myalgias  Abdominal    Substance History - negative use     OB/GYN negative ob/gyn ROS         Other   arthritis,     ROS/Med Hx Other: PAT Nursing Notes unavailable.               Anesthesia Plan    ASA 3     general     (Patient understands anesthesia not responsible for dental damage.)  intravenous induction     Anesthetic plan, risks, benefits, and alternatives have been provided, discussed and informed consent has been obtained with: patient.    Use of blood products discussed with patient .    Plan discussed with CRNA.    CODE STATUS:

## 2024-09-06 NOTE — OP NOTE
OP NOTE  RECTAL EXAM UNDER ANESTHESIA  Procedure Report    Patient Name:  Claudia Wilkins  YOB: 1995  [id number]    Date of Surgery:  9/6/2024     Indications: See last clinic note for indications, discussion of risk benefits and alternatives    Pre-op Diagnosis:   Anal fistula [K60.3]       Post-Op Diagnosis Codes:     * Anal fistula [K60.3]    Procedure/CPT® Codes:      Procedure(s): Exam under anesthesia with left anterior superficial fistulotomy with drainage of perianal abscess    Staff:  Surgeon(s):  Jim Villatoro MD    Assistant: Tonya Burger CSA    Anesthesia: General, Local    Estimated Blood Loss: 10 mL    Implants:    Nothing was implanted during the procedure    Specimen:          None      Findings: Recurrent left anterior anal fistula with superficial fistulotomy minimal division of external anal sphincter muscle.  Associated abscess drained with cavity curetted and left open to heal by secondary intention.  No division of internal anal sphincter muscle.    Complications: None    Description of Procedure:   After all questions were answered, consent was verified.  Patient brought to the operating room per stretcher placed in supine position arms out all extremities padded.  Bilateral lower extremity SCDs placed.  Anesthesia induced.  Preoperative IV antibiotics administered.  Patient placed in candycane lithotomy.  Patient's perianal area prepped with Betadine.  Draped in usual sterile fashion.  Critical timeout taken.  Began the procedure with exam under anesthesia.  Left anterior external os noted with fistula probe tracking in radial direction to the anus.  Inspection with no significant involvement of the sphincter muscle.  Fistulotomy performed with minimal division of external anal sphincter muscle.  Associated abscess with a fistula drained.  Abscess cavity curetted.  I then cauterized the abscess cavity to help stimulate healing.  I the distal base of the  fistulotomy with external sphincter fibers versus scar tissue reapproximated with Vicryl suture.  I irrigated.  I infiltrated with local.  I placed Xeroform gauze in the anus.  I placed 4 x 4 gauze in the abscess cavity.  Dressing applied.  At the end of the procedure all counts were correct.  I was present for the procedure.    Assistant: Tonya Burger CSA was responsible for performing the following activities: Retraction, Suction, and Placing Dressing and their skilled assistance was necessary for the success of this case.    Jim Villatoro MD     Date: 9/6/2024  Time: 12:48 EDT

## 2024-09-06 NOTE — ANESTHESIA POSTPROCEDURE EVALUATION
Patient: Claudia Wilkins    Procedure Summary       Date: 09/06/24 Room / Location: [facility] OR  / [facility]    Anesthesia Start: 1158 Anesthesia Stop: 1254    Procedure: EXAM UNDER ANESTHESIA,  fistulotomy (Rectum) Diagnosis:       Anal fistula      (Anal fistula [K60.3])    Surgeons: Jim Villatoro MD Provider: Gume Smith MD    Anesthesia Type: general ASA Status: 3            Anesthesia Type: general    Vitals  Vitals Value Taken Time   /67 09/06/24 1317   Temp 36.6 °C (97.9 °F) 09/06/24 1252   Pulse 116 09/06/24 1319   Resp 22 09/06/24 1310   SpO2 98 % 09/06/24 1319   Vitals shown include unfiled device data.        Post Anesthesia Care and Evaluation    Patient location during evaluation: bedside  Patient participation: complete - patient participated  Level of consciousness: awake  Pain management: adequate    Airway patency: patent  PONV Status: none  Cardiovascular status: acceptable and stable  Respiratory status: acceptable  Hydration status: acceptable

## 2024-09-06 NOTE — DISCHARGE INSTRUCTIONS
DISCHARGE INSTRUCTIONS  SURGICAL / AMBULATORY  PROCEDURES      For your surgery you had:  General anesthesia (you may have a sore throat for the first 24 hours)  IV sedation.  Local anesthesia  Monitored anesthesia Care    You have received an anesthesia medication today that can cause hormonal forms of birth control to be ineffective. You should use a different form of birth control (to prevent pregnancy) for 7 days.   You may experience dizziness, drowsiness, or light-headedness for several hours following surgery/procedure.  Do not stay alone today or tonight.  Limit your activity for 24 hours.  Resume your diet slowly.  Follow whatever special dietary instructions you may have been given by your doctor.  You should not drive or operate machinery, drink alcohol, or sign legally binding documents for 24 hours or while you are taking pain medication.    Last dose of pain medication was given at:  Tylenol at 1125am may start pain meds after 325pm today  .  NOTIFY YOUR DOCTOR IF YOU EXPERIENCE ANY OF THE FOLLOWING:  Temperature greater than 101 degrees Fahrenheit  Shaking Chills  Redness or excessive drainage from incision  Nausea, vomiting and/or pain that is not controlled by prescribed medications  Increase in bleeding or bleeding that is excessive  Unable to urinate in 6 hours after surgery  If unable to reach your doctor, please go to the closest Emergency Room  You may begin dressing changes:     [x] in 24 hours   [] in 48 hours   [x] Other: Call for fever, worsening redness, concern.  Remove yellow packing from anus and packing from the fistulotomy site later today or with first bowel movement.  Okay for tub bath and showers.  Avoid sitting directly on the anus.    .  You may shower today   .  Apply an ice pack 24-48 hours.  Medications per physician instructions as indicated on Discharge Medication Information Sheet.  You should see Dr. Villatoro  for follow-up care   on 3 weeks and as needed  .  Phone  number: [phone]      SPECIAL INSTRUCTIONS:

## 2024-09-09 RX ORDER — LOPERAMIDE HCL 2 MG
2 CAPSULE ORAL 2 TIMES DAILY
Qty: 180 CAPSULE | Refills: 3 | Status: SHIPPED | OUTPATIENT
Start: 2024-09-09

## 2024-09-14 DIAGNOSIS — L08.9 WOUND INFECTION: Primary | ICD-10-CM

## 2024-09-14 DIAGNOSIS — T14.8XXA WOUND INFECTION: Primary | ICD-10-CM

## 2024-09-19 ENCOUNTER — TELEPHONE (OUTPATIENT)
Dept: INTERNAL MEDICINE | Facility: CLINIC | Age: 29
End: 2024-09-19
Payer: MEDICARE

## 2024-09-20 RX ORDER — LOPERAMIDE HCL 2 MG
4 CAPSULE ORAL 2 TIMES DAILY
Qty: 480 CAPSULE | Refills: 3 | Status: SHIPPED | OUTPATIENT
Start: 2024-09-20

## 2024-10-02 ENCOUNTER — OFFICE VISIT (OUTPATIENT)
Dept: SURGERY | Facility: CLINIC | Age: 29
End: 2024-10-02
Payer: MEDICARE

## 2024-10-02 DIAGNOSIS — K60.30 ANAL FISTULA: Primary | ICD-10-CM

## 2024-10-02 PROCEDURE — 99024 POSTOP FOLLOW-UP VISIT: CPT | Performed by: SURGERY

## 2024-10-02 RX ORDER — CIPROFLOXACIN 500 MG/1
500 TABLET, FILM COATED ORAL 2 TIMES DAILY
Qty: 14 TABLET | Refills: 0 | Status: SHIPPED | OUTPATIENT
Start: 2024-10-02 | End: 2024-10-09

## 2024-10-02 NOTE — LETTER
October 4, 2024       No Recipients    Patient: Claudia Wilkins   YOB: 1995   Date of Visit: 10/2/2024     Dear Demi Fernandez MD:       Thank you for referring Claudia Wilkins to me for evaluation. Below are the relevant portions of my assessment and plan of care.    If you have questions, please do not hesitate to call me. I look forward to following Claudia along with you.         Sincerely,        Jim Villatoro MD        CC:   No Recipients    Jim Villatoro MD  10/04/24 1147  Sign when Signing Visit  General Surgery/Colorectal Surgery   Post -op Follow up Note    Patient Name:  Claudia Wilkins  YOB: 1995  [id number]    Referring Provider: No ref. provider found    Patient Care Team:  Demi Fernandez MD as PCP - General (Internal Medicine)  Jim Villatoro MD as Consulting Physician (General Surgery)    Chief complaint follow-up    Subjective.     History of present illness:    History of ulcerative colitis status post total proctocolectomy with J-pouch performed at [facility].  Since that time she has developed perirectal abscess with seton drain placement July 2021 by Dr. Gardner.  She is on chronic Flagyl for pouchitis.  She has a history of sclerosing cholangitis.  She is thought possibly to have Crohn's disease.     Status post flexible pouchoscopy 6/2/2023 with left anterior transsphincteric fistula noted with noncutting seton drain in place.  No evidence of infection.  No inflammation of the distal ileum.  Minimal inflammation of the pouch.  No stricture.  Biopsies of the ileum and small bowel pouch with no activity, no granulomas, no dysplasia    Status post exam under anesthesia with ligation of intersphincteric fistula tract 6/30/2023.  Pathology with benign anal mucosa with fibrosis and inflammation    Seen in follow-up with bloodstained drainage from the site of the previous fistula for 2 days.  Associated pain.  No fever.  No  current treatment for Crohn's disease.  No blood thinner use.  No changes in health or medications otherwise since last seen.  No imaging.    Status post exam under anesthesia 9/6/2024 with left anterior superficial fistulotomy, drainage of perianal abscess  Associated abscess cavity drained with cavity curetted.    She comes in for follow-up.  Her pain has improved.  Minimal drainage.  No fever.  She is able to sit comfortably.    History:  Past Medical History:   Diagnosis Date   • Anemia     NO CURRENT ISSUES   • Anxiety    • AR (allergic rhinitis)    • Arthritis    • Celiac disease    • CFS (chronic fatigue syndrome)    • Chronic liver disease     PRIMARY SCLEROSING CHOLANGITIS NO CURRENT ISSUES   • Chronic pain    • Crohn's disease    • Depression    • Fibromyalgia    • Fibromyalgia, primary    • Gastric ulcer    • H/O psychiatric care    • Hemorrhoids    • Hypermobility syndrome    • Hypothyroidism    • IBS (irritable bowel syndrome)    • Insomnia    • Lactose intolerance    • Moderate episode of recurrent major depressive disorder 01/29/2018    CONTINUE CURRENT MEDS    • Nasal crusting    • Osteopenia    • Pancreatitis     NO CURRENT ISSUES   • Panic attacks    • Pouchitis    • Primary sclerosing cholangitis    • PTSD (post-traumatic stress disorder)    • RA (rheumatoid arthritis)    • Recurrent epistaxis    • Scoliosis    • TMJ dysfunction    • Ulcerative colitis        Past Surgical History:   Procedure Laterality Date   • ANAL FISSURECTOMY/FISTULECTOMY N/A 6/30/2023    Procedure: ANAL FISTULA REPAIR ligation intersphincteric tract;  Surgeon: Jim Villatoro MD;  [facility];  Service: General;  Laterality: N/A;   • COLECTOMY TOTAL  2006    COLECTOMY W/ ILEOANAI J POUCH & SMALL BOWEL PULL THROUGH    • COLON SURGERY     • COLONOSCOPY     • ENDOSCOPY     • INCISION AND DRAINAGE PERIRECTAL ABSCESS     • RECTAL EXAMINATION UNDER ANESTHESIA N/A 9/6/2024    Procedure: EXAM UNDER ANESTHESIA,   fistulotomy;  Surgeon: Jim Villatoro MD;  Location[facility];  Service: General;  Laterality: N/A;   • SIGMOIDOSCOPY N/A 6/2/2023    Procedure: POUCHOSCOPY WITH BIOPSIES;  Surgeon: Jim Villatoro MD;  Location: [facility] ENDOSCOPY;  Service: General;  Laterality: N/A;  ULCERATIVE COLITIS       Family History   Problem Relation Age of Onset   • Anxiety disorder Mother    • Asthma Sister    • Diabetes Sister    • Arthritis Maternal Grandmother    • Colon polyps Maternal Grandmother    • Cancer Maternal Grandfather    • Heart disease Maternal Grandfather    • Prostate cancer Paternal Grandfather    • Stroke Other    • Heart disease Other    • Kidney disease Other    • Diabetes Other    • Rectal cancer Other    • Colon cancer Other    • Malig Hyperthermia Neg Hx        Social History     Tobacco Use   • Smoking status: Never   • Smokeless tobacco: Never   Vaping Use   • Vaping status: Never Used   Substance Use Topics   • Alcohol use: Never   • Drug use: Never       MEDS:  Prior to Admission medications    Medication Sig Start Date End Date Taking? Authorizing Provider   cyclobenzaprine (FLEXERIL) 5 MG tablet Take 1 tablet by mouth 2 (Two) Times a Day As Needed for Muscle Spasms. 7/11/24  Yes Demi Fernandez MD   ferrous sulfate (FeroSul) 325 (65 FE) MG tablet Take 1 tablet by mouth 2 (Two) Times a Day. 7/11/24  Yes Demi Fernandez MD   hydrOXYzine (ATARAX) 25 MG tablet Take 1 tablet by mouth 2 (Two) Times a Day. 7/11/24  Yes Demi Fernandez MD   Lactobacillus Acid-Pectin (Acidophilus/Pectin) capsule Take 1 capsule by mouth 2 (Two) Times a Day. 7/11/24  Yes Adrian Jessica APRN   levothyroxine (Synthroid) 100 MCG tablet Take 1 tablet by mouth Daily. 7/11/24  Yes Demi Fernandez MD   loperamide (IMODIUM) 2 MG capsule Take 2 capsules by mouth 2 (Two) Times a Day. 9/20/24  Yes Demi Fernandez MD   metroNIDAZOLE (FLAGYL) 250 MG tablet Take 1 tablet by mouth Every Morning. 7/11/24  Yes  Demi Fernandez MD   NON FORMULARY Pain medicated lotion with Gabapentin. Patient uses for Chronic pain/Arthritis   Yes Obdulia Alvarez MD   oxyCODONE-acetaminophen (Percocet) 5-325 MG per tablet Take 1 tablet by mouth Every 6 (Six) Hours As Needed for Moderate Pain. 9/6/24  Yes Jim Villatoro MD   sertraline (ZOLOFT) 50 MG tablet Take 1 tablet by mouth Daily. 7/11/24  Yes Demi Fernandez MD   ursodiol (ACTIGALL) 300 MG capsule Take 2 capsules by mouth 2 (Two) Times a Day. 7/11/24  Yes Demi Fernandez MD   ciprofloxacin (Cipro) 500 MG tablet Take 1 tablet by mouth 2 (Two) Times a Day for 7 days. 10/2/24 10/9/24  Jmi Villatoro MD   Incontinence Supply Disposable (Comfort Protect Adult Diaper/M) misc 1 each 5 (Five) Times a Day As Needed (for incontinence).  Patient not taking: Reported on 8/15/2024 9/13/22   Adrian Jessica APRN             No current facility-administered medications for this visit.       Allergies:  Gluten meal and Latex    Objective    Vital Signs        Physical Exam:     Some purulent drainage noted, no obvious abscess.    Results Review: I have reviewed the patient's labs and imaging    LABS/IMAGING:    Imaging Results (Last 72 Hours)       ** No results found for the last 72 hours. **             Lab Results (last 72 hours)       ** No results found for the last 72 hours. **               Result Review:     Assessment & Plan    * No active hospital problems. *     History of ulcerative colitis status post total proctocolectomy with J-pouch performed at [facility].  Since that time she has developed perirectal abscess with seton drain placement July 2021 by Dr. Gardner.  She is on chronic Flagyl for pouchitis.  She has a history of sclerosing cholangitis.  She is thought possibly to have Crohn's disease.     Status post flexible pouchoscopy 6/2/2023 with left anterior transsphincteric fistula noted with noncutting seton drain in place.  No evidence  of infection.  No inflammation of the distal ileum.  Minimal inflammation of the pouch.  No stricture.  Biopsies of the ileum and small bowel pouch with no activity, no granulomas, no dysplasia    Status post exam under anesthesia with ligation of intersphincteric fistula tract 6/30/2023.  Pathology with benign anal mucosa with fibrosis and inflammation    Recurrent anal fistula    Status post exam under anesthesia 9/6/2024 with left anterior superficial fistulotomy, drainage of perianal abscess  Associated abscess cavity drained with cavity curetted.     I reviewed the details of the surgery with the patient.  Some purulent drainage noted on exam with no abscess.  I recommended treating her with Cipro and continuing her Flagyl.  Follow-up with me in 2 weeks.  May need further debridement of the abscess cavity to help this close down.  Call me for fever.  All questions answered.  They agree with the plan.  Thank you for the consult.       Jim Villatoro MD  10/04/24 10:29 EDT

## 2024-10-04 NOTE — PROGRESS NOTES
General Surgery/Colorectal Surgery   Post -op Follow up Note    Patient Name:  Claudia Wilkins  YOB: 1995  [id number]    Referring Provider: No ref. provider found    Patient Care Team:  Demi Fernandez MD as PCP - General (Internal Medicine)  Jim Villatoro MD as Consulting Physician (General Surgery)    Chief complaint follow-up    Subjective .     History of present illness:    History of ulcerative colitis status post total proctocolectomy with J-pouch performed at [facility].  Since that time she has developed perirectal abscess with seton drain placement July 2021 by Dr. Gardner.  She is on chronic Flagyl for pouchitis.  She has a history of sclerosing cholangitis.  She is thought possibly to have Crohn's disease.     Status post flexible pouchoscopy 6/2/2023 with left anterior transsphincteric fistula noted with noncutting seton drain in place.  No evidence of infection.  No inflammation of the distal ileum.  Minimal inflammation of the pouch.  No stricture.  Biopsies of the ileum and small bowel pouch with no activity, no granulomas, no dysplasia    Status post exam under anesthesia with ligation of intersphincteric fistula tract 6/30/2023.  Pathology with benign anal mucosa with fibrosis and inflammation    Seen in follow-up with bloodstained drainage from the site of the previous fistula for 2 days.  Associated pain.  No fever.  No current treatment for Crohn's disease.  No blood thinner use.  No changes in health or medications otherwise since last seen.  No imaging.    Status post exam under anesthesia 9/6/2024 with left anterior superficial fistulotomy, drainage of perianal abscess  Associated abscess cavity drained with cavity curetted.    She comes in for follow-up.  Her pain has improved.  Minimal drainage.  No fever.  She is able to sit comfortably.    History:  Past Medical History:   Diagnosis Date    Anemia     NO CURRENT ISSUES    Anxiety     AR (allergic  rhinitis)     Arthritis     Celiac disease     CFS (chronic fatigue syndrome)     Chronic liver disease     PRIMARY SCLEROSING CHOLANGITIS NO CURRENT ISSUES    Chronic pain     Crohn's disease     Depression     Fibromyalgia     Fibromyalgia, primary     Gastric ulcer     H/O psychiatric care     Hemorrhoids     Hypermobility syndrome     Hypothyroidism     IBS (irritable bowel syndrome)     Insomnia     Lactose intolerance     Moderate episode of recurrent major depressive disorder 01/29/2018    CONTINUE CURRENT MEDS     Nasal crusting     Osteopenia     Pancreatitis     NO CURRENT ISSUES    Panic attacks     Pouchitis     Primary sclerosing cholangitis     PTSD (post-traumatic stress disorder)     RA (rheumatoid arthritis)     Recurrent epistaxis     Scoliosis     TMJ dysfunction     Ulcerative colitis        Past Surgical History:   Procedure Laterality Date    ANAL FISSURECTOMY/FISTULECTOMY N/A 6/30/2023    Procedure: ANAL FISTULA REPAIR ligation intersphincteric tract;  Surgeon: Jim Villatoro MD;  [place] [facility] [place] [facility]  Service: General;  Laterality: N/A;    COLECTOMY TOTAL  2006    COLECTOMY W/ ILEOANAI J POUCH & SMALL BOWEL PULL THROUGH     COLON SURGERY      COLONOSCOPY      ENDOSCOPY      INCISION AND DRAINAGE PERIRECTAL ABSCESS      RECTAL EXAMINATION UNDER ANESTHESIA N/A 9/6/2024    Procedure: EXAM UNDER ANESTHESIA,  fistulotomy;  Surgeon: Jim Villatoro MD;  Location: [facility];  Service: General;  Laterality: N/A;    SIGMOIDOSCOPY N/A 6/2/2023    Procedure: POUCHOSCOPY WITH BIOPSIES;  Surgeon: Jim Villatoro MD;  Location: [facility] ENDOSCOPY;  Service: General;  Laterality: N/A;  ULCERATIVE COLITIS       Family History   Problem Relation Age of Onset    Anxiety disorder Mother     Asthma Sister     Diabetes Sister     Arthritis Maternal Grandmother     Colon polyps Maternal Grandmother     Cancer Maternal Grandfather     Heart disease Maternal Grandfather     Prostate  cancer Paternal Grandfather     Stroke Other     Heart disease Other     Kidney disease Other     Diabetes Other     Rectal cancer Other     Colon cancer Other     Malig Hyperthermia Neg Hx        Social History     Tobacco Use    Smoking status: Never    Smokeless tobacco: Never   Vaping Use    Vaping status: Never Used   Substance Use Topics    Alcohol use: Never    Drug use: Never       MEDS:  Prior to Admission medications    Medication Sig Start Date End Date Taking? Authorizing Provider   cyclobenzaprine (FLEXERIL) 5 MG tablet Take 1 tablet by mouth 2 (Two) Times a Day As Needed for Muscle Spasms. 7/11/24  Yes Demi Fernandez MD   ferrous sulfate (FeroSul) 325 (65 FE) MG tablet Take 1 tablet by mouth 2 (Two) Times a Day. 7/11/24  Yes Demi Fernandez MD   hydrOXYzine (ATARAX) 25 MG tablet Take 1 tablet by mouth 2 (Two) Times a Day. 7/11/24  Yes Demi Fernandez MD   Lactobacillus Acid-Pectin (Acidophilus/Pectin) capsule Take 1 capsule by mouth 2 (Two) Times a Day. 7/11/24  Yes Adrian Jessica APRN   levothyroxine (Synthroid) 100 MCG tablet Take 1 tablet by mouth Daily. 7/11/24  Yes Demi Fernandez MD   loperamide (IMODIUM) 2 MG capsule Take 2 capsules by mouth 2 (Two) Times a Day. 9/20/24  Yes Demi Fernandez MD   metroNIDAZOLE (FLAGYL) 250 MG tablet Take 1 tablet by mouth Every Morning. 7/11/24  Yes Demi Fernandez MD   NON FORMULARY Pain medicated lotion with Gabapentin. Patient uses for Chronic pain/Arthritis   Yes Obdulia Alvarez MD   oxyCODONE-acetaminophen (Percocet) 5-325 MG per tablet Take 1 tablet by mouth Every 6 (Six) Hours As Needed for Moderate Pain. 9/6/24  Yes Jim Villatoro MD   sertraline (ZOLOFT) 50 MG tablet Take 1 tablet by mouth Daily. 7/11/24  Yes Demi Fernandez MD   ursodiol (ACTIGALL) 300 MG capsule Take 2 capsules by mouth 2 (Two) Times a Day. 7/11/24  Yes Demi Fernandez MD   ciprofloxacin (Cipro) 500 MG tablet Take 1 tablet by mouth 2 (Two)  Times a Day for 7 days. 10/2/24 10/9/24  Jim Villatoro MD   Incontinence Supply Disposable (Comfort Protect Adult Diaper/M) misc 1 each 5 (Five) Times a Day As Needed (for incontinence).  Patient not taking: Reported on 8/15/2024 9/13/22   Adrian Jessica APRN             No current facility-administered medications for this visit.       Allergies:  Gluten meal and Latex    Objective     Vital Signs        Physical Exam:     Some purulent drainage noted, no obvious abscess.    Results Review: I have reviewed the patient's labs and imaging    LABS/IMAGING:    Imaging Results (Last 72 Hours)       ** No results found for the last 72 hours. **             Lab Results (last 72 hours)       ** No results found for the last 72 hours. **               Result Review :     Assessment & Plan     * No active hospital problems. *     History of ulcerative colitis status post total proctocolectomy with J-pouch performed at [facility].  Since that time she has developed perirectal abscess with seton drain placement July 2021 by Dr. Gardner.  She is on chronic Flagyl for pouchitis.  She has a history of sclerosing cholangitis.  She is thought possibly to have Crohn's disease.     Status post flexible pouchoscopy 6/2/2023 with left anterior transsphincteric fistula noted with noncutting seton drain in place.  No evidence of infection.  No inflammation of the distal ileum.  Minimal inflammation of the pouch.  No stricture.  Biopsies of the ileum and small bowel pouch with no activity, no granulomas, no dysplasia    Status post exam under anesthesia with ligation of intersphincteric fistula tract 6/30/2023.  Pathology with benign anal mucosa with fibrosis and inflammation    Recurrent anal fistula    Status post exam under anesthesia 9/6/2024 with left anterior superficial fistulotomy, drainage of perianal abscess  Associated abscess cavity drained with cavity curetted.     I reviewed the details of the surgery  with the patient.  Some purulent drainage noted on exam with no abscess.  I recommended treating her with Cipro and continuing her Flagyl.  Follow-up with me in 2 weeks.  May need further debridement of the abscess cavity to help this close down.  Call me for fever.  All questions answered.  They agree with the plan.  Thank you for the consult.       Jim Villatoro MD  10/04/24 10:29 EDT

## 2024-10-16 ENCOUNTER — PREP FOR SURGERY (OUTPATIENT)
Dept: OTHER | Facility: HOSPITAL | Age: 29
End: 2024-10-16
Payer: MEDICARE

## 2024-10-16 ENCOUNTER — OFFICE VISIT (OUTPATIENT)
Dept: SURGERY | Facility: CLINIC | Age: 29
End: 2024-10-16
Payer: MEDICARE

## 2024-10-16 VITALS
WEIGHT: 115.8 LBS | DIASTOLIC BLOOD PRESSURE: 83 MMHG | BODY MASS INDEX: 21.31 KG/M2 | HEIGHT: 62 IN | SYSTOLIC BLOOD PRESSURE: 125 MMHG | HEART RATE: 123 BPM

## 2024-10-16 DIAGNOSIS — K60.30 ANAL FISTULA: Primary | ICD-10-CM

## 2024-10-16 DIAGNOSIS — R19.8 ANAL DISCHARGE: Primary | ICD-10-CM

## 2024-10-16 PROCEDURE — 1159F MED LIST DOCD IN RCRD: CPT | Performed by: SURGERY

## 2024-10-16 PROCEDURE — 99024 POSTOP FOLLOW-UP VISIT: CPT | Performed by: SURGERY

## 2024-10-16 PROCEDURE — 1160F RVW MEDS BY RX/DR IN RCRD: CPT | Performed by: SURGERY

## 2024-10-16 RX ORDER — SODIUM CHLORIDE 0.9 % (FLUSH) 0.9 %
10 SYRINGE (ML) INJECTION EVERY 12 HOURS SCHEDULED
OUTPATIENT
Start: 2024-10-16

## 2024-10-16 RX ORDER — SODIUM CHLORIDE 0.9 % (FLUSH) 0.9 %
10 SYRINGE (ML) INJECTION AS NEEDED
OUTPATIENT
Start: 2024-10-16

## 2024-10-16 RX ORDER — SODIUM CHLORIDE, SODIUM LACTATE, POTASSIUM CHLORIDE, CALCIUM CHLORIDE 600; 310; 30; 20 MG/100ML; MG/100ML; MG/100ML; MG/100ML
50 INJECTION, SOLUTION INTRAVENOUS CONTINUOUS
OUTPATIENT
Start: 2024-10-16 | End: 2024-10-17

## 2024-10-16 NOTE — LETTER
October 17, 2024     Demi Fernandez MD  [address] [facility] [address]  [place] [address]    Patient: Claudia Wilkins   YOB: 1995   Date of Visit: 10/16/2024     Dear Demi Fernandez MD:       Thank you for referring Claudia Wilkins to me for evaluation. Below are the relevant portions of my assessment and plan of care.    If you have questions, please do not hesitate to call me. I look forward to following Claudia along with you.         Sincerely,        Jim Villatoro MD        CC: No Recipients    Jim Villatoro MD  10/17/24 1228  Sign when Signing Visit  General Surgery/Colorectal Surgery   Post -op Follow up Note    Patient Name:  Claudia Wilkins  YOB: 1995  [id number]    Referring Provider: No ref. provider found    Patient Care Team:  Demi Fernandez MD as PCP - General (Internal Medicine)  Jim Villatoro MD as Consulting Physician (General Surgery)    Chief complaint follow-up    Subjective.     History of present illness:    History of ulcerative colitis status post total proctocolectomy with J-pouch performed at [facility].  Since that time she has developed perirectal abscess with seton drain placement July 2021 by Dr. Gardner.  She is on chronic Flagyl for pouchitis.  She has a history of sclerosing cholangitis.  She is thought possibly to have Crohn's disease.     Status post flexible pouchoscopy 6/2/2023 with left anterior transsphincteric fistula noted with noncutting seton drain in place.  No evidence of infection.  No inflammation of the distal ileum.  Minimal inflammation of the pouch.  No stricture.  Biopsies of the ileum and small bowel pouch with no activity, no granulomas, no dysplasia    Status post exam under anesthesia with ligation of intersphincteric fistula tract 6/30/2023.  Pathology with benign anal mucosa with fibrosis and inflammation    Seen in follow-up with bloodstained drainage from the site of the previous  fistula for 2 days.  Associated pain.  No fever.  No current treatment for Crohn's disease.  No blood thinner use.  No changes in health or medications otherwise since last seen.  No imaging.    Status post exam under anesthesia 9/6/2024 with left anterior superficial fistulotomy, drainage of perianal abscess  Associated abscess cavity drained with cavity curetted.    Drainage noted on last visit given Cipro and Flagyl.  She comes in for follow-up.  The drainage has improved.  No fever.  No pain.    History:  Past Medical History:   Diagnosis Date   • Anemia     NO CURRENT ISSUES   • Anxiety    • AR (allergic rhinitis)    • Arthritis    • Celiac disease    • CFS (chronic fatigue syndrome)    • Chronic liver disease     PRIMARY SCLEROSING CHOLANGITIS NO CURRENT ISSUES   • Chronic pain    • Colon polyp    • Crohn's disease    • Depression    • Fibromyalgia    • Fibromyalgia, primary    • Gastric ulcer    • GI (gastrointestinal bleed)     Ulcerative Colitis   • H/O psychiatric care    • Hemorrhoids    • History of transfusion    • Hypermobility syndrome    • Hypothyroidism    • IBS (irritable bowel syndrome)    • Insomnia    • Lactose intolerance    • Moderate episode of recurrent major depressive disorder 01/29/2018    CONTINUE CURRENT MEDS    • Nasal crusting    • Osteopenia    • Pancreatitis     NO CURRENT ISSUES   • Panic attacks    • PONV (postoperative nausea and vomiting)    • Pouchitis    • Primary sclerosing cholangitis    • PTSD (post-traumatic stress disorder)    • RA (rheumatoid arthritis)    • Rectal bleeding    • Recurrent epistaxis    • Scoliosis    • TMJ dysfunction    • Ulcerative colitis        Past Surgical History:   Procedure Laterality Date   • ANAL FISSURECTOMY/FISTULECTOMY N/A 6/30/2023    Procedure: ANAL FISTULA REPAIR ligation intersphincteric tract;  Surgeon: Jim Villatoro MD;  Location: [facility] OR [facility] [place];  Service: General;  Laterality: N/A;   • COLECTOMY TOTAL  2006    COLECTOMY W/  ILEOANAI J POUCH & SMALL BOWEL PULL THROUGH    • COLON SURGERY     • COLONOSCOPY     • ENDOSCOPY     • INCISION AND DRAINAGE PERIRECTAL ABSCESS     • RECTAL EXAMINATION UNDER ANESTHESIA N/A 9/6/2024    Procedure: EXAM UNDER ANESTHESIA,  fistulotomy;  Surgeon: Jim Villatoro MD;  [place] [facility] [place] [facility] [place]  Service: General;  Laterality: N/A;   • SIGMOIDOSCOPY N/A 6/2/2023    Procedure: POUCHOSCOPY WITH BIOPSIES;  Surgeon: Jim Villatoro MD;  Location: [facility] ENDOSCOPY;  Service: General;  Laterality: N/A;  ULCERATIVE COLITIS       Family History   Problem Relation Age of Onset   • Anxiety disorder Mother    • Asthma Sister    • Diabetes Sister    • Arthritis Maternal Grandmother    • Colon polyps Maternal Grandmother    • Cancer Maternal Grandfather    • Heart disease Maternal Grandfather    • Prostate cancer Paternal Grandfather    • Diabetes Paternal Grandfather    • Stroke Other    • Heart disease Other    • Kidney disease Other    • Diabetes Other    • Rectal cancer Other    • Colon cancer Other    • Malig Hyperthermia Neg Hx        Social History     Tobacco Use   • Smoking status: Never   • Smokeless tobacco: Never   Vaping Use   • Vaping status: Never Used   Substance Use Topics   • Alcohol use: Never   • Drug use: Never       MEDS:  Prior to Admission medications    Medication Sig Start Date End Date Taking? Authorizing Provider   cyclobenzaprine (FLEXERIL) 5 MG tablet Take 1 tablet by mouth 2 (Two) Times a Day As Needed for Muscle Spasms. 7/11/24  Yes Demi Fernandez MD   ferrous sulfate (FeroSul) 325 (65 FE) MG tablet Take 1 tablet by mouth 2 (Two) Times a Day. 7/11/24  Yes Demi Fernandez MD   hydrOXYzine (ATARAX) 25 MG tablet Take 1 tablet by mouth 2 (Two) Times a Day. 7/11/24  Yes Demi Fernandez MD   Lactobacillus Acid-Pectin (Acidophilus/Pectin) capsule Take 1 capsule by mouth 2 (Two) Times a Day. 7/11/24  Yes Adrian Jessica APRN   levothyroxine (Synthroid) 100 MCG tablet  Take 1 tablet by mouth Daily. 7/11/24  Yes Demi Fernandez MD   loperamide (IMODIUM) 2 MG capsule Take 2 capsules by mouth 2 (Two) Times a Day. 9/20/24  Yes Demi Fernandez MD   NON FORMULARY Pain medicated lotion with Gabapentin. Patient uses for Chronic pain/Arthritis   Yes ProviderObdulia MD   sertraline (ZOLOFT) 50 MG tablet Take 1 tablet by mouth Daily. 7/11/24  Yes Demi Fernandze MD   ursodiol (ACTIGALL) 300 MG capsule Take 2 capsules by mouth 2 (Two) Times a Day. 7/11/24  Yes Demi Fernandez MD   Incontinence Supply Disposable (Comfort Protect Adult Diaper/M) misc 1 each 5 (Five) Times a Day As Needed (for incontinence).  Patient not taking: Reported on 8/15/2024 9/13/22   Adrian Jessica APRN   metroNIDAZOLE (FLAGYL) 250 MG tablet Take 1 tablet by mouth Every Morning.  Patient not taking: Reported on 10/16/2024 7/11/24   Demi Fernandez MD   oxyCODONE-acetaminophen (Percocet) 5-325 MG per tablet Take 1 tablet by mouth Every 6 (Six) Hours As Needed for Moderate Pain.  Patient not taking: Reported on 10/16/2024 9/6/24   Jim Villatoro MD             No current facility-administered medications for this visit.       Allergies:  Gluten meal and Latex    Objective    Vital Signs   Heart Rate:  [123] 123  BP: (125)/(83) 125/83    Physical Exam:     No abscess on perianal exam    Results Review: I have reviewed the patient's labs and imaging    LABS/IMAGING:    Imaging Results (Last 72 Hours)       ** No results found for the last 72 hours. **             Lab Results (last 72 hours)       ** No results found for the last 72 hours. **               Result Review:     Assessment & Plan    * No active hospital problems. *     Status post exam under anesthesia 9/6/2024 with left anterior superficial fistulotomy, drainage of perianal abscess  Associated abscess cavity drained with cavity curetted.    Discussion with patient and family.  With her continued drainage, explained to them  this may be from the abscess cavity which is not completely closed.  I recommended exam under anesthesia with indicated procedures, possible seton drain, cauterized abscess cavity to try to help to get it closed.  Procedure and recovery discussed.  Benefits and alternatives discussed.  Risk procedure including risk of anesthesia, bleeding, infection, pain, need for more surgery discussed.  I encouraged him to follow-up with GI to discuss further medical treatment and possibly weaning of medication.  All questions answered.  She agrees with the plan.  Orders placed.  No bowel prep or enema.  Thank you for the consult.            Jim Villatoro MD  10/17/24 12:26 EDT

## 2024-10-17 RX ORDER — LEVOTHYROXINE SODIUM 100 UG/1
100 TABLET ORAL DAILY
Qty: 90 TABLET | Refills: 0 | Status: SHIPPED | OUTPATIENT
Start: 2024-10-17

## 2024-10-17 RX ORDER — CYCLOBENZAPRINE HCL 5 MG
5 TABLET ORAL 2 TIMES DAILY PRN
Qty: 180 TABLET | Refills: 0 | Status: SHIPPED | OUTPATIENT
Start: 2024-10-17

## 2024-10-17 RX ORDER — METRONIDAZOLE 250 MG/1
250 TABLET ORAL EVERY MORNING
Qty: 90 TABLET | Refills: 0 | Status: SHIPPED | OUTPATIENT
Start: 2024-10-17

## 2024-10-17 RX ORDER — FERROUS SULFATE 325(65) MG
1 TABLET ORAL 2 TIMES DAILY
Qty: 180 TABLET | Refills: 0 | Status: SHIPPED | OUTPATIENT
Start: 2024-10-17

## 2024-10-17 NOTE — PROGRESS NOTES
General Surgery/Colorectal Surgery   Post -op Follow up Note    Patient Name:  Claudia Wilkins  YOB: 1995  [id number]    Referring Provider: No ref. provider found    Patient Care Team:  Demi Fernandez MD as PCP - General (Internal Medicine)  Jim Villatoro MD as Consulting Physician (General Surgery)    Chief complaint follow-up    Subjective .     History of present illness:    History of ulcerative colitis status post total proctocolectomy with J-pouch performed at [facility].  Since that time she has developed perirectal abscess with seton drain placement July 2021 by Dr. Gardner.  She is on chronic Flagyl for pouchitis.  She has a history of sclerosing cholangitis.  She is thought possibly to have Crohn's disease.     Status post flexible pouchoscopy 6/2/2023 with left anterior transsphincteric fistula noted with noncutting seton drain in place.  No evidence of infection.  No inflammation of the distal ileum.  Minimal inflammation of the pouch.  No stricture.  Biopsies of the ileum and small bowel pouch with no activity, no granulomas, no dysplasia    Status post exam under anesthesia with ligation of intersphincteric fistula tract 6/30/2023.  Pathology with benign anal mucosa with fibrosis and inflammation    Seen in follow-up with bloodstained drainage from the site of the previous fistula for 2 days.  Associated pain.  No fever.  No current treatment for Crohn's disease.  No blood thinner use.  No changes in health or medications otherwise since last seen.  No imaging.    Status post exam under anesthesia 9/6/2024 with left anterior superficial fistulotomy, drainage of perianal abscess  Associated abscess cavity drained with cavity curetted.    Drainage noted on last visit given Cipro and Flagyl.  She comes in for follow-up.  The drainage has improved.  No fever.  No pain.    History:  Past Medical History:   Diagnosis Date    Anemia     NO CURRENT ISSUES    Anxiety      AR (allergic rhinitis)     Arthritis     Celiac disease     CFS (chronic fatigue syndrome)     Chronic liver disease     PRIMARY SCLEROSING CHOLANGITIS NO CURRENT ISSUES    Chronic pain     Colon polyp     Crohn's disease     Depression     Fibromyalgia     Fibromyalgia, primary     Gastric ulcer     GI (gastrointestinal bleed)     Ulcerative Colitis    H/O psychiatric care     Hemorrhoids     History of transfusion     Hypermobility syndrome     Hypothyroidism     IBS (irritable bowel syndrome)     Insomnia     Lactose intolerance     Moderate episode of recurrent major depressive disorder 01/29/2018    CONTINUE CURRENT MEDS     Nasal crusting     Osteopenia     Pancreatitis     NO CURRENT ISSUES    Panic attacks     PONV (postoperative nausea and vomiting)     Pouchitis     Primary sclerosing cholangitis     PTSD (post-traumatic stress disorder)     RA (rheumatoid arthritis)     Rectal bleeding     Recurrent epistaxis     Scoliosis     TMJ dysfunction     Ulcerative colitis        Past Surgical History:   Procedure Laterality Date    ANAL FISSURECTOMY/FISTULECTOMY N/A 6/30/2023    Procedure: ANAL FISTULA REPAIR ligation intersphincteric tract;  Surgeon: Jim Villatoro MD;  Location: [facility] OR [facility] [place];  Service: General;  Laterality: N/A;    COLECTOMY TOTAL  2006    COLECTOMY W/ ILEOANAI J POUCH & SMALL BOWEL PULL THROUGH     COLON SURGERY      COLONOSCOPY      ENDOSCOPY      INCISION AND DRAINAGE PERIRECTAL ABSCESS      RECTAL EXAMINATION UNDER ANESTHESIA N/A 9/6/2024    Procedure: EXAM UNDER ANESTHESIA,  fistulotomy;  Surgeon: Jim Villatoro MD;  Location: [facility] OR [facility];  Service: General;  Laterality: N/A;    SIGMOIDOSCOPY N/A 6/2/2023    Procedure: POUCHOSCOPY WITH BIOPSIES;  Surgeon: Jim Villatoro MD;  Location: [facility] ENDOSCOPY;  Service: General;  Laterality: N/A;  ULCERATIVE COLITIS       Family History   Problem Relation Age of Onset    Anxiety disorder Mother     Asthma Sister      Diabetes Sister     Arthritis Maternal Grandmother     Colon polyps Maternal Grandmother     Cancer Maternal Grandfather     Heart disease Maternal Grandfather     Prostate cancer Paternal Grandfather     Diabetes Paternal Grandfather     Stroke Other     Heart disease Other     Kidney disease Other     Diabetes Other     Rectal cancer Other     Colon cancer Other     Malig Hyperthermia Neg Hx        Social History     Tobacco Use    Smoking status: Never    Smokeless tobacco: Never   Vaping Use    Vaping status: Never Used   Substance Use Topics    Alcohol use: Never    Drug use: Never       MEDS:  Prior to Admission medications    Medication Sig Start Date End Date Taking? Authorizing Provider   cyclobenzaprine (FLEXERIL) 5 MG tablet Take 1 tablet by mouth 2 (Two) Times a Day As Needed for Muscle Spasms. 7/11/24  Yes Demi Fernandez MD   ferrous sulfate (FeroSul) 325 (65 FE) MG tablet Take 1 tablet by mouth 2 (Two) Times a Day. 7/11/24  Yes Demi Fernandez MD   hydrOXYzine (ATARAX) 25 MG tablet Take 1 tablet by mouth 2 (Two) Times a Day. 7/11/24  Yes Demi Fernandez MD   Lactobacillus Acid-Pectin (Acidophilus/Pectin) capsule Take 1 capsule by mouth 2 (Two) Times a Day. 7/11/24  Yes Adrian Jessica APRN   levothyroxine (Synthroid) 100 MCG tablet Take 1 tablet by mouth Daily. 7/11/24  Yes Demi Fernandez MD   loperamide (IMODIUM) 2 MG capsule Take 2 capsules by mouth 2 (Two) Times a Day. 9/20/24  Yes Demi Fernandez MD   NON FORMULARY Pain medicated lotion with Gabapentin. Patient uses for Chronic pain/Arthritis   Yes ProviderObdulia MD   sertraline (ZOLOFT) 50 MG tablet Take 1 tablet by mouth Daily. 7/11/24  Yes Demi Fernandez MD   ursodiol (ACTIGALL) 300 MG capsule Take 2 capsules by mouth 2 (Two) Times a Day. 7/11/24  Yes Demi Fernandez MD   Incontinence Supply Disposable (Comfort Protect Adult Diaper/M) misc 1 each 5 (Five) Times a Day As Needed (for incontinence).  Patient not  taking: Reported on 8/15/2024 9/13/22   Jaskaran AdrianERIK   metroNIDAZOLE (FLAGYL) 250 MG tablet Take 1 tablet by mouth Every Morning.  Patient not taking: Reported on 10/16/2024 7/11/24   Demi Fernandez MD   oxyCODONE-acetaminophen (Percocet) 5-325 MG per tablet Take 1 tablet by mouth Every 6 (Six) Hours As Needed for Moderate Pain.  Patient not taking: Reported on 10/16/2024 9/6/24   Jim Villatoro MD             No current facility-administered medications for this visit.       Allergies:  Gluten meal and Latex    Objective     Vital Signs   Heart Rate:  [123] 123  BP: (125)/(83) 125/83    Physical Exam:     No abscess on perianal exam    Results Review: I have reviewed the patient's labs and imaging    LABS/IMAGING:    Imaging Results (Last 72 Hours)       ** No results found for the last 72 hours. **             Lab Results (last 72 hours)       ** No results found for the last 72 hours. **               Result Review :     Assessment & Plan     * No active hospital problems. *     Status post exam under anesthesia 9/6/2024 with left anterior superficial fistulotomy, drainage of perianal abscess  Associated abscess cavity drained with cavity curetted.    Discussion with patient and family.  With her continued drainage, explained to them this may be from the abscess cavity which is not completely closed.  I recommended exam under anesthesia with indicated procedures, possible seton drain, cauterized abscess cavity to try to help to get it closed.  Procedure and recovery discussed.  Benefits and alternatives discussed.  Risk procedure including risk of anesthesia, bleeding, infection, pain, need for more surgery discussed.  I encouraged him to follow-up with GI to discuss further medical treatment and possibly weaning of medication.  All questions answered.  She agrees with the plan.  Orders placed.  No bowel prep or enema.  Thank you for the consult.            Jim Villatoro MD  10/17/24  12:26 EDT

## 2024-10-17 NOTE — H&P (VIEW-ONLY)
General Surgery/Colorectal Surgery   Post -op Follow up Note    Patient Name:  Claudia Wilkins  YOB: 1995  [id number]    Referring Provider: No ref. provider found    Patient Care Team:  Demi Fernandez MD as PCP - General (Internal Medicine)  Jim Villatoro MD as Consulting Physician (General Surgery)    Chief complaint follow-up    Subjective .     History of present illness:    History of ulcerative colitis status post total proctocolectomy with J-pouch performed at [facility].  Since that time she has developed perirectal abscess with seton drain placement July 2021 by Dr. Gardner.  She is on chronic Flagyl for pouchitis.  She has a history of sclerosing cholangitis.  She is thought possibly to have Crohn's disease.     Status post flexible pouchoscopy 6/2/2023 with left anterior transsphincteric fistula noted with noncutting seton drain in place.  No evidence of infection.  No inflammation of the distal ileum.  Minimal inflammation of the pouch.  No stricture.  Biopsies of the ileum and small bowel pouch with no activity, no granulomas, no dysplasia    Status post exam under anesthesia with ligation of intersphincteric fistula tract 6/30/2023.  Pathology with benign anal mucosa with fibrosis and inflammation    Seen in follow-up with bloodstained drainage from the site of the previous fistula for 2 days.  Associated pain.  No fever.  No current treatment for Crohn's disease.  No blood thinner use.  No changes in health or medications otherwise since last seen.  No imaging.    Status post exam under anesthesia 9/6/2024 with left anterior superficial fistulotomy, drainage of perianal abscess  Associated abscess cavity drained with cavity curetted.    Drainage noted on last visit given Cipro and Flagyl.  She comes in for follow-up.  The drainage has improved.  No fever.  No pain.    History:  Past Medical History:   Diagnosis Date    Anemia     NO CURRENT ISSUES    Anxiety      AR (allergic rhinitis)     Arthritis     Celiac disease     CFS (chronic fatigue syndrome)     Chronic liver disease     PRIMARY SCLEROSING CHOLANGITIS NO CURRENT ISSUES    Chronic pain     Colon polyp     Crohn's disease     Depression     Fibromyalgia     Fibromyalgia, primary     Gastric ulcer     GI (gastrointestinal bleed)     Ulcerative Colitis    H/O psychiatric care     Hemorrhoids     History of transfusion     Hypermobility syndrome     Hypothyroidism     IBS (irritable bowel syndrome)     Insomnia     Lactose intolerance     Moderate episode of recurrent major depressive disorder 01/29/2018    CONTINUE CURRENT MEDS     Nasal crusting     Osteopenia     Pancreatitis     NO CURRENT ISSUES    Panic attacks     PONV (postoperative nausea and vomiting)     Pouchitis     Primary sclerosing cholangitis     PTSD (post-traumatic stress disorder)     RA (rheumatoid arthritis)     Rectal bleeding     Recurrent epistaxis     Scoliosis     TMJ dysfunction     Ulcerative colitis        Past Surgical History:   Procedure Laterality Date    ANAL FISSURECTOMY/FISTULECTOMY N/A 6/30/2023    Procedure: ANAL FISTULA REPAIR ligation intersphincteric tract;  Surgeon: Jim Villatoro MD;  [place] [facility] [place] [facility] [place]  Service: General;  Laterality: N/A;    COLECTOMY TOTAL  2006    COLECTOMY W/ ILEOANAI J POUCH & SMALL BOWEL PULL THROUGH     COLON SURGERY      COLONOSCOPY      ENDOSCOPY      INCISION AND DRAINAGE PERIRECTAL ABSCESS      RECTAL EXAMINATION UNDER ANESTHESIA N/A 9/6/2024    Procedure: EXAM UNDER ANESTHESIA,  fistulotomy;  Surgeon: Jim Villatoro MD;  Location: [facility];  Service: General;  Laterality: N/A;    SIGMOIDOSCOPY N/A 6/2/2023    Procedure: POUCHOSCOPY WITH BIOPSIES;  Surgeon: Jim Villatoro MD;  Location: [facility] ENDOSCOPY;  Service: General;  Laterality: N/A;  ULCERATIVE COLITIS       Family History   Problem Relation Age of Onset    Anxiety disorder Mother     Asthma Sister      Diabetes Sister     Arthritis Maternal Grandmother     Colon polyps Maternal Grandmother     Cancer Maternal Grandfather     Heart disease Maternal Grandfather     Prostate cancer Paternal Grandfather     Diabetes Paternal Grandfather     Stroke Other     Heart disease Other     Kidney disease Other     Diabetes Other     Rectal cancer Other     Colon cancer Other     Malig Hyperthermia Neg Hx        Social History     Tobacco Use    Smoking status: Never    Smokeless tobacco: Never   Vaping Use    Vaping status: Never Used   Substance Use Topics    Alcohol use: Never    Drug use: Never       MEDS:  Prior to Admission medications    Medication Sig Start Date End Date Taking? Authorizing Provider   cyclobenzaprine (FLEXERIL) 5 MG tablet Take 1 tablet by mouth 2 (Two) Times a Day As Needed for Muscle Spasms. 7/11/24  Yes Demi Fernandez MD   ferrous sulfate (FeroSul) 325 (65 FE) MG tablet Take 1 tablet by mouth 2 (Two) Times a Day. 7/11/24  Yes Demi Fernandez MD   hydrOXYzine (ATARAX) 25 MG tablet Take 1 tablet by mouth 2 (Two) Times a Day. 7/11/24  Yes Demi Fernandez MD   Lactobacillus Acid-Pectin (Acidophilus/Pectin) capsule Take 1 capsule by mouth 2 (Two) Times a Day. 7/11/24  Yes Adrian Jessica APRN   levothyroxine (Synthroid) 100 MCG tablet Take 1 tablet by mouth Daily. 7/11/24  Yes Demi Fernandez MD   loperamide (IMODIUM) 2 MG capsule Take 2 capsules by mouth 2 (Two) Times a Day. 9/20/24  Yes Demi Fernandez MD   NON FORMULARY Pain medicated lotion with Gabapentin. Patient uses for Chronic pain/Arthritis   Yes ProviderObdulia MD   sertraline (ZOLOFT) 50 MG tablet Take 1 tablet by mouth Daily. 7/11/24  Yes Demi Fernandez MD   ursodiol (ACTIGALL) 300 MG capsule Take 2 capsules by mouth 2 (Two) Times a Day. 7/11/24  Yes Demi Fernandez MD   Incontinence Supply Disposable (Comfort Protect Adult Diaper/M) misc 1 each 5 (Five) Times a Day As Needed (for incontinence).  Patient not  taking: Reported on 8/15/2024 9/13/22   Jaskaran AdrianERIK   metroNIDAZOLE (FLAGYL) 250 MG tablet Take 1 tablet by mouth Every Morning.  Patient not taking: Reported on 10/16/2024 7/11/24   Demi Fernandez MD   oxyCODONE-acetaminophen (Percocet) 5-325 MG per tablet Take 1 tablet by mouth Every 6 (Six) Hours As Needed for Moderate Pain.  Patient not taking: Reported on 10/16/2024 9/6/24   Jim Villatoro MD             No current facility-administered medications for this visit.       Allergies:  Gluten meal and Latex    Objective     Vital Signs   Heart Rate:  [123] 123  BP: (125)/(83) 125/83    Physical Exam:     No abscess on perianal exam    Results Review: I have reviewed the patient's labs and imaging    LABS/IMAGING:    Imaging Results (Last 72 Hours)       ** No results found for the last 72 hours. **             Lab Results (last 72 hours)       ** No results found for the last 72 hours. **               Result Review :     Assessment & Plan     * No active hospital problems. *     Status post exam under anesthesia 9/6/2024 with left anterior superficial fistulotomy, drainage of perianal abscess  Associated abscess cavity drained with cavity curetted.    Discussion with patient and family.  With her continued drainage, explained to them this may be from the abscess cavity which is not completely closed.  I recommended exam under anesthesia with indicated procedures, possible seton drain, cauterized abscess cavity to try to help to get it closed.  Procedure and recovery discussed.  Benefits and alternatives discussed.  Risk procedure including risk of anesthesia, bleeding, infection, pain, need for more surgery discussed.  I encouraged him to follow-up with GI to discuss further medical treatment and possibly weaning of medication.  All questions answered.  She agrees with the plan.  Orders placed.  No bowel prep or enema.  Thank you for the consult.            Jim Villatoro MD  10/17/24  12:26 EDT

## 2024-10-25 NOTE — PRE-PROCEDURE INSTRUCTIONS
PATIENT AND PATIENT'S INSTRUCTED  FOR PATIENT TO BE:    - NOTHING TO EAT AFTER MIDNIGHT OR CHEW, EXCEPT CAN HAVE SIPS OF WATER WITH MEDICATIONS     - TO HOLD ALL VITAMINS, SUPPLEMENTS, NSAIDS FOR ONE WEEK PRIOR TO THEIR SURGICAL PROCEDURE    - DO NOT TAKE __N/A__ 7 DAYS PRIOR TO PROCEDURE PER ANESTHESIA RECOMMENDATIONS/INSTRUCTIONS     - INSTRUCTED PT TO USE SURGICAL SOAP 1 TIME THE NIGHT PRIOR TO SURGERY ___[id number]________ OR THE AM OF SURGERY __67-91-34___________   USE THE SOAP FROM NECK TO TOES, AVOID THEIR FACE, HAIR, AND PRIVATE PARTS. IF USE THE SOAP THE NIGHT PRIOR TO SURGERY, CHANGE BED LINENS AND NO PETS IN THE BED.     INSTRUCTED NO LOTIONS, JEWELRY, PIERCINGS,  NAIL POLISH, OR DEODORANT DAY OF SURGERY    - IF DIABETIC, CHECK BLOOD GLUCOSE IF LESS THAN 70 OR HAVING SYMPTOMS CALL THE PREOP AREA FOR INSTRUCTIONS ON AM OF SURGERY ([phone] )    -INSTRUCTED TO TAKE THE FOLLOWING MEDICATIONS THE DAY OF SURGERY WITH SIPS OF WATER:        FLEXERIL IF NEEDED, IRON, ATARAX, SYNTHROI,D FLAGYL. ZOLOFT, URSODIOL        - DO NOT BRING ANY MEDICATIONS WITH YOU TO THE HOSPITAL THE DAY OF SURGERY, EXCEPT IF USE INHALERS. BRING INHALERS DAY OF SURGERY       - BRING CPAP OR BIPAP TO THE HOSPITAL ONLY IF YOU ARE SPENDING THE NIGHT    - DO NOT SMOKE OR VAPE 24 HOURS PRIOR TO PROCEDURE PER ANESTHESIA REQUEST     -MAKE SURE YOU HAVE A RIDE HOME OR SOMEONE TO STAY WITH YOU THE DAY OF THE PROCEDURE AFTER YOU GO HOME     - FOLLOW ANY OTHER INSTRUCTIONS GIVEN TO YOU BY YOUR SURGEON'S OFFICE.     - DAY OF SURGERY __[id number]___, COME TO [facility] A, THIRD FLOOR, CHECK IN AT THE DESK FOR REGISTRATION/SURGERY     - YOU WILL RECEIVE A PHONE CALL THE DAY PRIOR TO SURGERY BETWEEN 1PM AND 4 PM WITH ARRIVAL TIME, IF YOUR SURGERY IS ON A MONDAY YOU WILL RECEIVE A CALL THE FRIDAY PRIOR TO SURGERY DATE    - BRING CASH OR CREDIT CARD FOR COPAYMENT OF MEDICATIONS AFTER SURGERY IF YOU USE THE HOSPITAL PHARMACY (MEDS TO BED)    - PREADMISSION  TESTING NURSE TERESSA ANDRADE -[phone] IF HAVE ANY QUESTIONS     -PATIENT PROVIDED THE NUMBER FOR PREOP SURGICAL DEPT IF HAD QUESTIONS AFTER HOURS PRIOR TO SURGERY ([phone] ).  INFORMED PT IF NO ANSWER, LEAVE A MESSAGE AND SOMEONE WILL RETURN THEIR CALL       PATIENT AND PATIENT'S MOTHER VERBALIZED UNDERSTANDING

## 2024-10-28 ENCOUNTER — ANESTHESIA EVENT (OUTPATIENT)
Dept: PERIOP | Facility: HOSPITAL | Age: 29
End: 2024-10-28
Payer: MEDICARE

## 2024-10-28 ENCOUNTER — ANESTHESIA (OUTPATIENT)
Dept: PERIOP | Facility: HOSPITAL | Age: 29
End: 2024-10-28
Payer: MEDICARE

## 2024-10-28 ENCOUNTER — HOSPITAL ENCOUNTER (OUTPATIENT)
Facility: HOSPITAL | Age: 29
Setting detail: HOSPITAL OUTPATIENT SURGERY
Discharge: HOME | End: 2024-10-28
Attending: SURGERY | Admitting: SURGERY
Payer: MEDICARE

## 2024-10-28 VITALS
RESPIRATION RATE: 16 BRPM | WEIGHT: 113.98 LBS | HEIGHT: 62 IN | BODY MASS INDEX: 20.97 KG/M2 | HEART RATE: 112 BPM | DIASTOLIC BLOOD PRESSURE: 69 MMHG | TEMPERATURE: 98.2 F | OXYGEN SATURATION: 100 % | SYSTOLIC BLOOD PRESSURE: 105 MMHG

## 2024-10-28 DIAGNOSIS — R19.8 ANAL DISCHARGE: ICD-10-CM

## 2024-10-28 DIAGNOSIS — K60.30 ANAL FISTULA: Primary | ICD-10-CM

## 2024-10-28 LAB — B-HCG UR QL: NEGATIVE

## 2024-10-28 PROCEDURE — 25010000002 BUPIVACAINE (PF) 0.25 % SOLUTION: Performed by: SURGERY

## 2024-10-28 PROCEDURE — 25810000003 LACTATED RINGERS PER 1000 ML: Performed by: SURGERY

## 2024-10-28 PROCEDURE — 46999 UNLISTED PROCEDURE ANUS: CPT | Performed by: SURGERY

## 2024-10-28 PROCEDURE — 25810000003 LACTATED RINGERS PER 1000 ML: Performed by: ANESTHESIOLOGY

## 2024-10-28 PROCEDURE — 81025 URINE PREGNANCY TEST: CPT | Performed by: ANESTHESIOLOGY

## 2024-10-28 PROCEDURE — 25010000002 MIDAZOLAM PER 1MG: Performed by: ANESTHESIOLOGY

## 2024-10-28 PROCEDURE — 25010000002 BUPRENORPHINE PER 0.1 MG: Performed by: SURGERY

## 2024-10-28 PROCEDURE — 25010000002 LIDOCAINE PF 2% 2 % SOLUTION

## 2024-10-28 PROCEDURE — 25010000002 CEFAZOLIN PER 500 MG: Performed by: SURGERY

## 2024-10-28 PROCEDURE — 25010000002 DEXAMETHASONE SODIUM PHOSPHATE 100 MG/10ML SOLUTION: Performed by: SURGERY

## 2024-10-28 PROCEDURE — 25010000002 PROPOFOL 200 MG/20ML EMULSION

## 2024-10-28 PROCEDURE — 25010000002 ONDANSETRON PER 1 MG

## 2024-10-28 PROCEDURE — 25010000002 FENTANYL CITRATE (PF) 50 MCG/ML SOLUTION

## 2024-10-28 PROCEDURE — 25010000002 DEXAMETHASONE PER 1 MG

## 2024-10-28 RX ORDER — ONDANSETRON 2 MG/ML
INJECTION INTRAMUSCULAR; INTRAVENOUS AS NEEDED
Status: DISCONTINUED | OUTPATIENT
Start: 2024-10-28 | End: 2024-10-28 | Stop reason: SURG

## 2024-10-28 RX ORDER — SODIUM CHLORIDE 0.9 % (FLUSH) 0.9 %
10 SYRINGE (ML) INJECTION EVERY 12 HOURS SCHEDULED
Status: DISCONTINUED | OUTPATIENT
Start: 2024-10-28 | End: 2024-10-28 | Stop reason: HOSPADM

## 2024-10-28 RX ORDER — SODIUM CHLORIDE 0.9 % (FLUSH) 0.9 %
10 SYRINGE (ML) INJECTION AS NEEDED
Status: DISCONTINUED | OUTPATIENT
Start: 2024-10-28 | End: 2024-10-28 | Stop reason: HOSPADM

## 2024-10-28 RX ORDER — FENTANYL CITRATE 50 UG/ML
INJECTION, SOLUTION INTRAMUSCULAR; INTRAVENOUS AS NEEDED
Status: DISCONTINUED | OUTPATIENT
Start: 2024-10-28 | End: 2024-10-28 | Stop reason: SURG

## 2024-10-28 RX ORDER — MIDAZOLAM HYDROCHLORIDE 2 MG/2ML
2 INJECTION, SOLUTION INTRAMUSCULAR; INTRAVENOUS ONCE
Status: COMPLETED | OUTPATIENT
Start: 2024-10-28 | End: 2024-10-28

## 2024-10-28 RX ORDER — PROMETHAZINE HYDROCHLORIDE 25 MG/1
25 SUPPOSITORY RECTAL ONCE AS NEEDED
Status: DISCONTINUED | OUTPATIENT
Start: 2024-10-28 | End: 2024-10-28 | Stop reason: HOSPADM

## 2024-10-28 RX ORDER — DEXMEDETOMIDINE HYDROCHLORIDE 100 UG/ML
INJECTION, SOLUTION INTRAVENOUS AS NEEDED
Status: DISCONTINUED | OUTPATIENT
Start: 2024-10-28 | End: 2024-10-28 | Stop reason: SURG

## 2024-10-28 RX ORDER — PROMETHAZINE HYDROCHLORIDE 12.5 MG/1
25 TABLET ORAL ONCE AS NEEDED
Status: DISCONTINUED | OUTPATIENT
Start: 2024-10-28 | End: 2024-10-28 | Stop reason: HOSPADM

## 2024-10-28 RX ORDER — SODIUM CHLORIDE, SODIUM LACTATE, POTASSIUM CHLORIDE, CALCIUM CHLORIDE 600; 310; 30; 20 MG/100ML; MG/100ML; MG/100ML; MG/100ML
50 INJECTION, SOLUTION INTRAVENOUS CONTINUOUS
Status: DISCONTINUED | OUTPATIENT
Start: 2024-10-28 | End: 2024-10-28 | Stop reason: HOSPADM

## 2024-10-28 RX ORDER — DEXAMETHASONE SODIUM PHOSPHATE 4 MG/ML
INJECTION, SOLUTION INTRA-ARTICULAR; INTRALESIONAL; INTRAMUSCULAR; INTRAVENOUS; SOFT TISSUE AS NEEDED
Status: DISCONTINUED | OUTPATIENT
Start: 2024-10-28 | End: 2024-10-28 | Stop reason: SURG

## 2024-10-28 RX ORDER — SCOLOPAMINE TRANSDERMAL SYSTEM 1 MG/1
1 PATCH, EXTENDED RELEASE TRANSDERMAL ONCE
Status: DISCONTINUED | OUTPATIENT
Start: 2024-10-28 | End: 2024-10-28 | Stop reason: HOSPADM

## 2024-10-28 RX ORDER — ACETAMINOPHEN 500 MG
1000 TABLET ORAL ONCE
Status: COMPLETED | OUTPATIENT
Start: 2024-10-28 | End: 2024-10-28

## 2024-10-28 RX ORDER — OXYCODONE HYDROCHLORIDE 5 MG/1
5 TABLET ORAL
Status: DISCONTINUED | OUTPATIENT
Start: 2024-10-28 | End: 2024-10-28 | Stop reason: HOSPADM

## 2024-10-28 RX ORDER — ONDANSETRON 2 MG/ML
4 INJECTION INTRAMUSCULAR; INTRAVENOUS ONCE AS NEEDED
Status: DISCONTINUED | OUTPATIENT
Start: 2024-10-28 | End: 2024-10-28 | Stop reason: HOSPADM

## 2024-10-28 RX ORDER — LIDOCAINE HYDROCHLORIDE 20 MG/ML
INJECTION, SOLUTION EPIDURAL; INFILTRATION; INTRACAUDAL; PERINEURAL AS NEEDED
Status: DISCONTINUED | OUTPATIENT
Start: 2024-10-28 | End: 2024-10-28 | Stop reason: SURG

## 2024-10-28 RX ORDER — PROPOFOL 10 MG/ML
INJECTION, EMULSION INTRAVENOUS AS NEEDED
Status: DISCONTINUED | OUTPATIENT
Start: 2024-10-28 | End: 2024-10-28 | Stop reason: SURG

## 2024-10-28 RX ORDER — OXYCODONE AND ACETAMINOPHEN 5; 325 MG/1; MG/1
1 TABLET ORAL EVERY 6 HOURS PRN
Qty: 8 TABLET | Refills: 0 | Status: SHIPPED | OUTPATIENT
Start: 2024-10-28

## 2024-10-28 RX ORDER — SODIUM CHLORIDE, SODIUM LACTATE, POTASSIUM CHLORIDE, CALCIUM CHLORIDE 600; 310; 30; 20 MG/100ML; MG/100ML; MG/100ML; MG/100ML
9 INJECTION, SOLUTION INTRAVENOUS ONCE
Status: COMPLETED | OUTPATIENT
Start: 2024-10-28 | End: 2024-10-28

## 2024-10-28 RX ADMIN — MIDAZOLAM HYDROCHLORIDE 2 MG: 1 INJECTION, SOLUTION INTRAMUSCULAR; INTRAVENOUS at 11:27

## 2024-10-28 RX ADMIN — FENTANYL CITRATE 50 MCG: 50 INJECTION, SOLUTION INTRAMUSCULAR; INTRAVENOUS at 12:00

## 2024-10-28 RX ADMIN — SODIUM CHLORIDE, POTASSIUM CHLORIDE, SODIUM LACTATE AND CALCIUM CHLORIDE: 600; 310; 30; 20 INJECTION, SOLUTION INTRAVENOUS at 11:40

## 2024-10-28 RX ADMIN — LIDOCAINE HYDROCHLORIDE 60 MG: 20 INJECTION, SOLUTION EPIDURAL; INFILTRATION; INTRACAUDAL; PERINEURAL at 11:45

## 2024-10-28 RX ADMIN — FENTANYL CITRATE 50 MCG: 50 INJECTION, SOLUTION INTRAMUSCULAR; INTRAVENOUS at 11:45

## 2024-10-28 RX ADMIN — DEXAMETHASONE SODIUM PHOSPHATE 4 MG: 4 INJECTION, SOLUTION INTRAMUSCULAR; INTRAVENOUS at 11:51

## 2024-10-28 RX ADMIN — ACETAMINOPHEN 1000 MG: 500 TABLET ORAL at 10:08

## 2024-10-28 RX ADMIN — SODIUM CHLORIDE 2000 MG: 9 INJECTION, SOLUTION INTRAVENOUS at 11:51

## 2024-10-28 RX ADMIN — SCOPALAMINE 1 PATCH: 1 PATCH, EXTENDED RELEASE TRANSDERMAL at 10:09

## 2024-10-28 RX ADMIN — SODIUM CHLORIDE, POTASSIUM CHLORIDE, SODIUM LACTATE AND CALCIUM CHLORIDE 9 ML/HR: 600; 310; 30; 20 INJECTION, SOLUTION INTRAVENOUS at 10:08

## 2024-10-28 RX ADMIN — ONDANSETRON 4 MG: 2 INJECTION INTRAMUSCULAR; INTRAVENOUS at 12:08

## 2024-10-28 RX ADMIN — PROPOFOL 100 MG: 10 INJECTION, EMULSION INTRAVENOUS at 11:45

## 2024-10-28 RX ADMIN — DEXMEDETOMIDINE HYDROCHLORIDE 20 MCG: 100 INJECTION, SOLUTION, CONCENTRATE INTRAVENOUS at 12:03

## 2024-10-28 NOTE — ANESTHESIA PREPROCEDURE EVALUATION
Anesthesia Evaluation     Patient summary reviewed and Nursing notes reviewed   no history of anesthetic complications:   NPO Solid Status: > 8 hours  NPO Liquid Status: > 2 hours           Airway   Mallampati: II  TM distance: >3 FB  Neck ROM: full  No difficulty expected and Small opening  Dental      Pulmonary - negative pulmonary ROS and normal exam    breath sounds clear to auscultation  Cardiovascular - negative cardio ROS and normal exam  Exercise tolerance: good (4-7 METS)    Rhythm: regular  Rate: normal        Neuro/Psych  (+) psychiatric history Anxiety and PTSD  GI/Hepatic/Renal/Endo    (+) PUD, liver disease (primary sclerosing cholangitis), thyroid problem hypothyroidism    ROS Comment: Ulcerative colitis s/p colectomy    Musculoskeletal     (+) chronic pain, myalgias  Abdominal    Substance History - negative use     OB/GYN negative ob/gyn ROS         Other   arthritis,     ROS/Med Hx Other: PAT Nursing Notes unavailable.     Primary sclerosing cholangitis      Phys Exam Other: +TMJ                Anesthesia Plan    ASA 3     general     (Patient understands anesthesia not responsible for dental damage.    Requests paper tape.)  intravenous induction     Anesthetic plan, risks, benefits, and alternatives have been provided, discussed and informed consent has been obtained with: patient.    Use of blood products discussed with patient .    Plan discussed with CRNA.      CODE STATUS:

## 2024-10-28 NOTE — DISCHARGE INSTRUCTIONS
DISCHARGE INSTRUCTIONS  SURGICAL / AMBULATORY  PROCEDURES      For your surgery you had:  General anesthesia (you may have a sore throat for the first 24 hours)    You received a medicated patch for nausea prevention today (behind your ear). It is recommended that you remove it 24-48 hours post-operatively. It must be removed within 72 hours.     You may experience dizziness, drowsiness, or light-headedness for several hours following surgery/procedure.  Do not stay alone today or tonight.  Limit your activity for 24 hours.  Resume your diet slowly.  Follow whatever special dietary instructions you may have been given by your doctor.  You should not drive or operate machinery, drink alcohol, or sign legally binding documents for 24 hours or while you are taking pain medication.    NOTIFY YOUR DOCTOR IF YOU EXPERIENCE ANY OF THE FOLLOWING:  Temperature greater than 101 degrees Fahrenheit  Shaking Chills  Redness or excessive drainage from incision  Nausea, vomiting and/or pain that is not controlled by prescribed medications  Increase in bleeding or bleeding that is excessive  Unable to urinate in 6 hours after surgery  If unable to reach your doctor, please go to the closest Emergency Room  SPECIAL INSTRUCTIONS:  Call for fever, concern.    Remove gauze from cavity later today.    Okay to tub bath, shower.    May sit on ice as needed.    Expect some bloody drainage.     Last dose of pain medication was given at:     10:08AM 1000mg of Tylenol given. Do not exceed 4000mg in 24hours.

## 2024-10-28 NOTE — OP NOTE
OP NOTE  RECTAL EXAM UNDER ANESTHESIA  Procedure Report    Patient Name:  Claudia Wilkins  YOB: 1995  [id number]    Date of Surgery:  10/28/2024     Indications: See last clinic note for indications, discussion of risk benefits and alternatives    Pre-op Diagnosis:   Anal discharge [R19.8]       Post-Op Diagnosis Codes:     * Anal discharge [R19.8]    Procedure/CPT® Codes:      Procedure(s): Exam under anesthesia with curetting and fulguration of perianal cavity from previous fistulotomy    Staff:  Surgeon(s):  Jim Villatoro MD    Assistant: Tonya Burger CSA    Anesthesia: General, Local    Estimated Blood Loss: 10 mL    Implants:    Nothing was implanted during the procedure    Specimen:          None      Findings: No evidence of anal fistula.  No abscess. Exam under anesthesia with curetting and fulguration of left anterior perianal cavity from previous fistulotomy.  Cavity measured approximately 2 x 2 x 2 cm.    Complications: None    Description of Procedure:   After all questions were answered, consent was verified.  Patient brought to operative room per stretcher placed in supine position arms out all extremities padded.  Bilateral lower extremity SCDs placed.  Anesthesia induced.  Preoperative IV antibiotics ministered.  Patient placed in candycane lithotomy.  Patient's perianal area prepped with Betadine.  We draped in usual sterile fashion.  Critical timeout taken.  Began the procedure with exam under anesthesia.  Digital rectal exam with no mass and no blood.  Hill-Woodruff retractor with no evidence of anal fistula or infection.  Left anterior perianal cavity approximately 2 x 2 x 2 cm at the site of previous fistulotomy.  This was curetted and subsequently fulgurated to help stimulate closure.  I infiltrated with local anesthesia.  I verified hemostasis.  I placed Xeroform gauze in the cavity followed by dressing.  At the end of the procedure all counts were correct.  I  was present for the procedure.    Assistant: Tonya Burger CSA was responsible for performing the following activities: Retraction, Suction, and Placing Dressing and their skilled assistance was necessary for the success of this case.    Jim Villatoro MD     Date: 10/28/2024  Time: 12:12 EDT

## 2024-10-28 NOTE — ANESTHESIA POSTPROCEDURE EVALUATION
Patient: Claudia Wilkins    Procedure Summary       Date: 10/28/24 Room / Location: [facility] OR 06 / [facility] MAIN OR    Anesthesia Start: 1140 Anesthesia Stop: 1224    Procedure: EXAM UNDER ANESTHESIA, RECTAL BIOPSY, take a look at anus and treat as indicated (Rectum) Diagnosis:       Anal discharge      (Anal discharge [R19.8])    Surgeons: Jim Villatoro MD Provider: Betsy Posada MD    Anesthesia Type: general ASA Status: 3            Anesthesia Type: general    Vitals  Vitals Value Taken Time   /66 10/28/24 1305   Temp 36.4 °C (97.5 °F) 10/28/24 1300   Pulse 108 10/28/24 1305   Resp 12 10/28/24 1305   SpO2 94 % 10/28/24 1305           Post Anesthesia Care and Evaluation    Patient location during evaluation: bedside  Patient participation: complete - patient participated  Level of consciousness: awake  Pain management: adequate    Airway patency: patent  PONV Status: none  Cardiovascular status: acceptable and stable  Respiratory status: acceptable  Hydration status: acceptable

## 2024-11-20 ENCOUNTER — OFFICE VISIT (OUTPATIENT)
Dept: SURGERY | Facility: CLINIC | Age: 29
End: 2024-11-20
Payer: MEDICARE

## 2024-11-20 VITALS — WEIGHT: 114.7 LBS | RESPIRATION RATE: 16 BRPM | HEIGHT: 62 IN | BODY MASS INDEX: 21.11 KG/M2

## 2024-11-20 DIAGNOSIS — K62.89 ANAL PAIN: Primary | ICD-10-CM

## 2024-11-20 PROCEDURE — 99024 POSTOP FOLLOW-UP VISIT: CPT | Performed by: SURGERY

## 2024-11-20 NOTE — LETTER
November 24, 2024     Demi Fernandez MD  [address]  [place] [address]    Patient: Claudia Wilkins   YOB: 1995   Date of Visit: 11/20/2024     Dear Demi Fernandez MD:       Thank you for referring Claudia Wilkins to me for evaluation. Below are the relevant portions of my assessment and plan of care.    If you have questions, please do not hesitate to call me. I look forward to following Claudia along with you.         Sincerely,        Jim Villatroo MD        CC: No Recipients    Jim Villatoro MD  11/24/24 1312  Sign when Signing Visit  General Surgery/Colorectal Surgery   Post -op Follow up Note    Patient Name:  Claudia Wilkins  YOB: 1995  [id number]    Referring Provider: No ref. provider found    Patient Care Team:  Demi Fernandez MD as PCP - General (Internal Medicine)  Jim Villatoro MD as Consulting Physician (General Surgery)    Chief complaint follow-up    Subjective.     History of present illness:    History of ulcerative colitis status post total proctocolectomy with J-pouch performed at [facility].  Since that time she has developed perirectal abscess with seton drain placement July 2021 by Dr. Gardner.  She is on chronic Flagyl for pouchitis.  She has a history of sclerosing cholangitis.  She is thought possibly to have Crohn's disease.     Status post flexible pouchoscopy 6/2/2023 with left anterior transsphincteric fistula noted with noncutting seton drain in place.  No evidence of infection.  No inflammation of the distal ileum.  Minimal inflammation of the pouch.  No stricture.  Biopsies of the ileum and small bowel pouch with no activity, no granulomas, no dysplasia    Status post exam under anesthesia with ligation of intersphincteric fistula tract 6/30/2023.  Pathology with benign anal mucosa with fibrosis and inflammation    Seen in follow-up with bloodstained drainage from the site of the previous  fistula for 2 days.  Associated pain.  No fever.  No current treatment for Crohn's disease.  No blood thinner use.  No changes in health or medications otherwise since last seen.  No imaging.    Status post exam under anesthesia 9/6/2024 with left anterior superficial fistulotomy, drainage of perianal abscess  Associated abscess cavity drained with cavity curetted.    Status post exam under anesthesia with curetting and fulguration of perianal cavity from previous fistulotomy 10/28/2024.    She comes in for follow-up.  Her pain is improving.  Decreased drainage.  No fever.    History:  Past Medical History:   Diagnosis Date   • Anemia     NO CURRENT ISSUES   • Anxiety    • AR (allergic rhinitis)    • Arthritis    • Celiac disease    • CFS (chronic fatigue syndrome)    • Chronic liver disease     PRIMARY SCLEROSING CHOLANGITIS NO CURRENT ISSUES R/T TO UC- ASYMPTOMATIC   • Chronic pain    • Colon polyp    • Crohn's disease    • Depression    • Fibromyalgia    • Fibromyalgia, primary    • Gastric ulcer    • GI (gastrointestinal bleed)     Ulcerative Colitis   • H/O psychiatric care    • Hemorrhoids    • History of transfusion    • Hypermobility syndrome    • Hypothyroidism    • IBS (irritable bowel syndrome)    • Insomnia    • Lactose intolerance    • Moderate episode of recurrent major depressive disorder 01/29/2018    CONTINUE CURRENT MEDS    • Nasal crusting    • Osteopenia    • Pancreatitis     NO CURRENT ISSUES   • Panic attacks    • PONV (postoperative nausea and vomiting)    • Pouchitis    • Primary sclerosing cholangitis    • PTSD (post-traumatic stress disorder)    • RA (rheumatoid arthritis)    • Rectal bleeding    • Recurrent epistaxis    • Scoliosis    • TMJ dysfunction    • Ulcerative colitis        Past Surgical History:   Procedure Laterality Date   • ANAL FISSURECTOMY/FISTULECTOMY N/A 6/30/2023    Procedure: ANAL FISTULA REPAIR ligation intersphincteric tract;  Surgeon: Jim Villatoro MD;   Location: [facility];  Service: General;  Laterality: N/A;   • COLECTOMY TOTAL  2006    COLECTOMY W/ ILEOANAI J POUCH & SMALL BOWEL PULL THROUGH    • COLON SURGERY     • COLONOSCOPY     • ENDOSCOPY     • INCISION AND DRAINAGE PERIRECTAL ABSCESS     • RECTAL EXAMINATION UNDER ANESTHESIA N/A 9/6/2024    Procedure: EXAM UNDER ANESTHESIA,  fistulotomy;  Surgeon: Jim Villatoro MD;  Location: [facility];  Service: General;  Laterality: N/A;   • RECTAL EXAMINATION UNDER ANESTHESIA N/A 10/28/2024    Procedure: EXAM UNDER ANESTHESIA, RECTAL BIOPSY, take a look at anus and treat as indicated;  Surgeon: Jim Villatoro MD;  Location: [facility];  Service: General;  Laterality: N/A;   • SIGMOIDOSCOPY N/A 6/2/2023    Procedure: POUCHOSCOPY WITH BIOPSIES;  Surgeon: Jim Villatoro MD;  Location: [facility];  Service: General;  Laterality: N/A;  ULCERATIVE COLITIS       Family History   Problem Relation Age of Onset   • Anxiety disorder Mother    • Asthma Sister    • Diabetes Sister    • Arthritis Maternal Grandmother    • Colon polyps Maternal Grandmother    • Cancer Maternal Grandfather    • Heart disease Maternal Grandfather    • Prostate cancer Paternal Grandfather    • Diabetes Paternal Grandfather    • Stroke Other    • Heart disease Other    • Kidney disease Other    • Diabetes Other    • Rectal cancer Other    • Colon cancer Other    • Malig Hyperthermia Neg Hx        Social History     Tobacco Use   • Smoking status: Never   • Smokeless tobacco: Never   Vaping Use   • Vaping status: Never Used   Substance Use Topics   • Alcohol use: Never   • Drug use: Never       MEDS:  Prior to Admission medications    Medication Sig Start Date End Date Taking? Authorizing Provider   cyclobenzaprine (FLEXERIL) 5 MG tablet TAKE 1 TABLET BY MOUTH TWICE DAILY AS NEEDED FOR MUSCLE SPASMS  Patient taking differently: Take 1 tablet by mouth 2 (Two) Times a Day. 10/17/24  Yes Demi Fernandez MD    FeroSul 325 (65 Fe) MG tablet TAKE 1 TABLET BY MOUTH TWICE DAILY 10/17/24  Yes Demi Fernandez MD   hydrOXYzine (ATARAX) 25 MG tablet Take 1 tablet by mouth 2 (Two) Times a Day. 7/11/24  Yes Demi Fernandez MD   Lactobacillus Acid-Pectin (Acidophilus/Pectin) capsule Take 1 capsule by mouth 2 (Two) Times a Day. 7/11/24  Yes Adrian Jessica APRN   levothyroxine (SYNTHROID, LEVOTHROID) 100 MCG tablet TAKE 1 TABLET BY MOUTH DAILY 10/17/24  Yes Demi Fernandez MD   loperamide (IMODIUM) 2 MG capsule Take 2 capsules by mouth 2 (Two) Times a Day. 9/20/24  Yes Demi Fernandez MD   metroNIDAZOLE (FLAGYL) 250 MG tablet TAKE 1 TABLET BY MOUTH EVERY MORNING 10/17/24  Yes Demi Fernandez MD   NON FORMULARY Pain medicated lotion with Gabapentin. Patient uses for Chronic pain/Arthritis   Yes Obdulia Alvarez MD   oxyCODONE-acetaminophen (Percocet) 5-325 MG per tablet Take 1 tablet by mouth Every 6 (Six) Hours As Needed for Moderate Pain. 10/28/24  Yes Jim Villatoro MD   sertraline (ZOLOFT) 50 MG tablet Take 1 tablet by mouth Daily. 7/11/24  Yes Demi Fernandez MD   ursodiol (ACTIGALL) 300 MG capsule Take 2 capsules by mouth 2 (Two) Times a Day. 7/11/24  Yes Demi Fernandez MD             No current facility-administered medications for this visit.       Allergies:  Gluten meal and Latex    Objective    Vital Signs        Physical Exam:     Sitting comfortably in no acute distress    Results Review: I have reviewed the patient's labs and imaging    LABS/IMAGING:    Imaging Results (Last 72 Hours)       ** No results found for the last 72 hours. **             Lab Results (last 72 hours)       ** No results found for the last 72 hours. **               Result Review:Labs  Result Review  Imaging  Med Tab  Media    Assessment & Plan    * No active hospital problems. *     History of ulcerative colitis status post total proctocolectomy with J-pouch performed at [facility].  Since  that time she has developed perirectal abscess with seton drain placement July 2021 by Dr. Gardner.  She is on chronic Flagyl for pouchitis.  She has a history of sclerosing cholangitis.  She is thought possibly to have Crohn's disease.     Status post flexible pouchoscopy 6/2/2023 with left anterior transsphincteric fistula noted with noncutting seton drain in place.  No evidence of infection.  No inflammation of the distal ileum.  Minimal inflammation of the pouch.  No stricture.  Biopsies of the ileum and small bowel pouch with no activity, no granulomas, no dysplasia    Status post exam under anesthesia with ligation of intersphincteric fistula tract 6/30/2023.  Pathology with benign anal mucosa with fibrosis and inflammation    Status post exam under anesthesia 9/6/2024 with left anterior superficial fistulotomy, drainage of perianal abscess  Associated abscess cavity drained with cavity curetted.    Status post exam under anesthesia with curetting and fulguration of perianal cavity from previous fistulotomy 10/28/2024.     I reviewed the details of the surgery with the patient.  I recommended follow-up in 2 to 3 weeks for silver nitrate application in the office.  She was instructed to try some Tylenol or Motrin before coming to see me to help with the pain.  All questions answered.  She agrees with the plan.  Thank you for the consult.    Jim Villatoro MD  11/24/24 13:10 EST

## 2024-11-24 NOTE — PROGRESS NOTES
General Surgery/Colorectal Surgery   Post -op Follow up Note    Patient Name:  Claudia Wilkins  YOB: 1995  [id number]    Referring Provider: No ref. provider found    Patient Care Team:  Demi Fernandez MD as PCP - General (Internal Medicine)  Jim Villatoro MD as Consulting Physician (General Surgery)    Chief complaint follow-up    Subjective .     History of present illness:    History of ulcerative colitis status post total proctocolectomy with J-pouch performed at [facility].  Since that time she has developed perirectal abscess with seton drain placement July 2021 by Dr. Gardner.  She is on chronic Flagyl for pouchitis.  She has a history of sclerosing cholangitis.  She is thought possibly to have Crohn's disease.     Status post flexible pouchoscopy 6/2/2023 with left anterior transsphincteric fistula noted with noncutting seton drain in place.  No evidence of infection.  No inflammation of the distal ileum.  Minimal inflammation of the pouch.  No stricture.  Biopsies of the ileum and small bowel pouch with no activity, no granulomas, no dysplasia    Status post exam under anesthesia with ligation of intersphincteric fistula tract 6/30/2023.  Pathology with benign anal mucosa with fibrosis and inflammation    Seen in follow-up with bloodstained drainage from the site of the previous fistula for 2 days.  Associated pain.  No fever.  No current treatment for Crohn's disease.  No blood thinner use.  No changes in health or medications otherwise since last seen.  No imaging.    Status post exam under anesthesia 9/6/2024 with left anterior superficial fistulotomy, drainage of perianal abscess  Associated abscess cavity drained with cavity curetted.    Status post exam under anesthesia with curetting and fulguration of perianal cavity from previous fistulotomy 10/28/2024.    She comes in for follow-up.  Her pain is improving.  Decreased drainage.  No fever.    History:  Past  Medical History:   Diagnosis Date    Anemia     NO CURRENT ISSUES    Anxiety     AR (allergic rhinitis)     Arthritis     Celiac disease     CFS (chronic fatigue syndrome)     Chronic liver disease     PRIMARY SCLEROSING CHOLANGITIS NO CURRENT ISSUES R/T TO UC- ASYMPTOMATIC    Chronic pain     Colon polyp     Crohn's disease     Depression     Fibromyalgia     Fibromyalgia, primary     Gastric ulcer     GI (gastrointestinal bleed)     Ulcerative Colitis    H/O psychiatric care     Hemorrhoids     History of transfusion     Hypermobility syndrome     Hypothyroidism     IBS (irritable bowel syndrome)     Insomnia     Lactose intolerance     Moderate episode of recurrent major depressive disorder 01/29/2018    CONTINUE CURRENT MEDS     Nasal crusting     Osteopenia     Pancreatitis     NO CURRENT ISSUES    Panic attacks     PONV (postoperative nausea and vomiting)     Pouchitis     Primary sclerosing cholangitis     PTSD (post-traumatic stress disorder)     RA (rheumatoid arthritis)     Rectal bleeding     Recurrent epistaxis     Scoliosis     TMJ dysfunction     Ulcerative colitis        Past Surgical History:   Procedure Laterality Date    ANAL FISSURECTOMY/FISTULECTOMY N/A 6/30/2023    Procedure: ANAL FISTULA REPAIR ligation intersphincteric tract;  Surgeon: Jim Villatoro MD;  Location: [facility] OR [facility];  Service: General;  Laterality: N/A;    COLECTOMY TOTAL  2006    COLECTOMY W/ ILEOANAI J POUCH & SMALL BOWEL PULL THROUGH     COLON SURGERY      COLONOSCOPY      ENDOSCOPY      INCISION AND DRAINAGE PERIRECTAL ABSCESS      RECTAL EXAMINATION UNDER ANESTHESIA N/A 9/6/2024    Procedure: EXAM UNDER ANESTHESIA,  fistulotomy;  Surgeon: Jim Villatoro MD;  Location: [facility];  Service: General;  Laterality: N/A;    RECTAL EXAMINATION UNDER ANESTHESIA N/A 10/28/2024    Procedure: EXAM UNDER ANESTHESIA, RECTAL BIOPSY, take a look at anus and treat as indicated;  Surgeon: Jim Villatoro MD;   Location: [facility];  Service: General;  Laterality: N/A;    SIGMOIDOSCOPY N/A 6/2/2023    Procedure: POUCHOSCOPY WITH BIOPSIES;  Surgeon: Jim Villatoro MD;  Location: [facility];  Service: General;  Laterality: N/A;  ULCERATIVE COLITIS       Family History   Problem Relation Age of Onset    Anxiety disorder Mother     Asthma Sister     Diabetes Sister     Arthritis Maternal Grandmother     Colon polyps Maternal Grandmother     Cancer Maternal Grandfather     Heart disease Maternal Grandfather     Prostate cancer Paternal Grandfather     Diabetes Paternal Grandfather     Stroke Other     Heart disease Other     Kidney disease Other     Diabetes Other     Rectal cancer Other     Colon cancer Other     Malig Hyperthermia Neg Hx        Social History     Tobacco Use    Smoking status: Never    Smokeless tobacco: Never   Vaping Use    Vaping status: Never Used   Substance Use Topics    Alcohol use: Never    Drug use: Never       MEDS:  Prior to Admission medications    Medication Sig Start Date End Date Taking? Authorizing Provider   cyclobenzaprine (FLEXERIL) 5 MG tablet TAKE 1 TABLET BY MOUTH TWICE DAILY AS NEEDED FOR MUSCLE SPASMS  Patient taking differently: Take 1 tablet by mouth 2 (Two) Times a Day. 10/17/24  Yes Demi Fernandez MD   FeroSul 325 (65 Fe) MG tablet TAKE 1 TABLET BY MOUTH TWICE DAILY 10/17/24  Yes Demi Fernandez MD   hydrOXYzine (ATARAX) 25 MG tablet Take 1 tablet by mouth 2 (Two) Times a Day. 7/11/24  Yes Demi Fernandez MD   Lactobacillus Acid-Pectin (Acidophilus/Pectin) capsule Take 1 capsule by mouth 2 (Two) Times a Day. 7/11/24  Yes Adrian Jessica APRN   levothyroxine (SYNTHROID, LEVOTHROID) 100 MCG tablet TAKE 1 TABLET BY MOUTH DAILY 10/17/24  Yes Demi Fernandez MD   loperamide (IMODIUM) 2 MG capsule Take 2 capsules by mouth 2 (Two) Times a Day. 9/20/24  Yes Demi Fernandez MD   metroNIDAZOLE (FLAGYL) 250 MG tablet TAKE 1 TABLET BY MOUTH EVERY MORNING  10/17/24  Yes Demi Fernandez MD   NON FORMULARY Pain medicated lotion with Gabapentin. Patient uses for Chronic pain/Arthritis   Yes Obdulia Alvarez MD   oxyCODONE-acetaminophen (Percocet) 5-325 MG per tablet Take 1 tablet by mouth Every 6 (Six) Hours As Needed for Moderate Pain. 10/28/24  Yes Jim Villatoro MD   sertraline (ZOLOFT) 50 MG tablet Take 1 tablet by mouth Daily. 7/11/24  Yes Demi Fernandez MD   ursodiol (ACTIGALL) 300 MG capsule Take 2 capsules by mouth 2 (Two) Times a Day. 7/11/24  Yes Demi Fernandez MD             No current facility-administered medications for this visit.       Allergies:  Gluten meal and Latex    Objective     Vital Signs        Physical Exam:     Sitting comfortably in no acute distress    Results Review: I have reviewed the patient's labs and imaging    LABS/IMAGING:    Imaging Results (Last 72 Hours)       ** No results found for the last 72 hours. **             Lab Results (last 72 hours)       ** No results found for the last 72 hours. **               Result Review :Labs  Result Review  Imaging  Med Tab  Media    Assessment & Plan     * No active hospital problems. *     History of ulcerative colitis status post total proctocolectomy with J-pouch performed at [facility].  Since that time she has developed perirectal abscess with seton drain placement July 2021 by Dr. Gardner.  She is on chronic Flagyl for pouchitis.  She has a history of sclerosing cholangitis.  She is thought possibly to have Crohn's disease.     Status post flexible pouchoscopy 6/2/2023 with left anterior transsphincteric fistula noted with noncutting seton drain in place.  No evidence of infection.  No inflammation of the distal ileum.  Minimal inflammation of the pouch.  No stricture.  Biopsies of the ileum and small bowel pouch with no activity, no granulomas, no dysplasia    Status post exam under anesthesia with ligation of intersphincteric fistula tract  6/30/2023.  Pathology with benign anal mucosa with fibrosis and inflammation    Status post exam under anesthesia 9/6/2024 with left anterior superficial fistulotomy, drainage of perianal abscess  Associated abscess cavity drained with cavity curetted.    Status post exam under anesthesia with curetting and fulguration of perianal cavity from previous fistulotomy 10/28/2024.     I reviewed the details of the surgery with the patient.  I recommended follow-up in 2 to 3 weeks for silver nitrate application in the office.  She was instructed to try some Tylenol or Motrin before coming to see me to help with the pain.  All questions answered.  She agrees with the plan.  Thank you for the consult.    Jim Villatoro MD  11/24/24 13:10 EST

## 2025-01-03 ENCOUNTER — TELEPHONE (OUTPATIENT)
Dept: SURGERY | Facility: CLINIC | Age: 30
End: 2025-01-03
Payer: MEDICARE

## 2025-01-03 NOTE — TELEPHONE ENCOUNTER
PT MOTHER ISABELA (ON VERBAL) RETURNED YOUR CALL. PLEASE CALL ISABELA [phone]      Attempted to Warm Transfer but, unable to reach the Practice:

## 2025-01-10 ENCOUNTER — TELEPHONE (OUTPATIENT)
Dept: SURGERY | Facility: CLINIC | Age: 30
End: 2025-01-10
Payer: MEDICARE

## 2025-01-13 RX ORDER — FERROUS SULFATE 325(65) MG
1 TABLET ORAL 2 TIMES DAILY
Qty: 180 TABLET | Refills: 0 | Status: SHIPPED | OUTPATIENT
Start: 2025-01-13

## 2025-01-13 RX ORDER — LEVOTHYROXINE SODIUM 100 UG/1
100 TABLET ORAL DAILY
Qty: 90 TABLET | Refills: 0 | Status: SHIPPED | OUTPATIENT
Start: 2025-01-13

## 2025-01-13 RX ORDER — LOPERAMIDE HYDROCHLORIDE 2 MG/1
4 CAPSULE ORAL 2 TIMES DAILY
Qty: 480 CAPSULE | Refills: 3 | Status: SHIPPED | OUTPATIENT
Start: 2025-01-13

## 2025-01-13 RX ORDER — URSODIOL 300 MG/1
600 CAPSULE ORAL 2 TIMES DAILY
Qty: 360 CAPSULE | Refills: 1 | Status: SHIPPED | OUTPATIENT
Start: 2025-01-13

## 2025-01-13 RX ORDER — GARLIC EXTRACT 500 MG
1 CAPSULE ORAL 2 TIMES DAILY
Qty: 180 CAPSULE | Refills: 3 | Status: SHIPPED | OUTPATIENT
Start: 2025-01-13

## 2025-01-13 RX ORDER — HYDROXYZINE HYDROCHLORIDE 25 MG/1
25 TABLET, FILM COATED ORAL 2 TIMES DAILY
Qty: 180 TABLET | Refills: 1 | Status: SHIPPED | OUTPATIENT
Start: 2025-01-13

## 2025-01-13 RX ORDER — CYCLOBENZAPRINE HCL 5 MG
5 TABLET ORAL 2 TIMES DAILY PRN
Qty: 180 TABLET | Refills: 0 | Status: SHIPPED | OUTPATIENT
Start: 2025-01-13

## 2025-01-20 NOTE — TELEPHONE ENCOUNTER
3RD CALL - CALLED PT TO OFFER R/S OF CX'D APPT 1/8 W/ DESMOND    NO ANSWER, LMOM  CALLED HOME NUMBER, NO ANSWER, LMOM    THREE ATTEMPTS MADE TO OFFER R/S, ANYTHING ELSE TO DO?

## 2025-04-11 ENCOUNTER — APPOINTMENT (OUTPATIENT)
Dept: CT IMAGING | Facility: HOSPITAL | Age: 30
End: 2025-04-11
Payer: MEDICARE

## 2025-04-11 ENCOUNTER — HOSPITAL ENCOUNTER (EMERGENCY)
Facility: HOSPITAL | Age: 30
Discharge: HOME | End: 2025-04-12
Attending: EMERGENCY MEDICINE
Payer: MEDICARE

## 2025-04-11 DIAGNOSIS — R10.11 RIGHT UPPER QUADRANT ABDOMINAL PAIN: ICD-10-CM

## 2025-04-11 DIAGNOSIS — N83.201 CYST OF RIGHT OVARY: Primary | ICD-10-CM

## 2025-04-11 LAB
ALBUMIN SERPL-MCNC: 3.8 G/DL (ref 3.5–5.2)
ALBUMIN/GLOB SERPL: 1 G/DL
ALP SERPL-CCNC: 156 U/L (ref 39–117)
ALT SERPL W P-5'-P-CCNC: 17 U/L (ref 1–33)
ANION GAP SERPL CALCULATED.3IONS-SCNC: 10.4 MMOL/L (ref 5–15)
AST SERPL-CCNC: 21 U/L (ref 1–32)
BASOPHILS # BLD AUTO: 0.08 10*3/MM3 (ref 0–0.2)
BASOPHILS NFR BLD AUTO: 0.9 % (ref 0–1.5)
BILIRUB SERPL-MCNC: 0.5 MG/DL (ref 0–1.2)
BILIRUB UR QL STRIP: NEGATIVE
BUN SERPL-MCNC: 7 MG/DL (ref 6–20)
BUN/CREAT SERPL: 9 (ref 7–25)
CALCIUM SPEC-SCNC: 8.8 MG/DL (ref 8.6–10.5)
CHLORIDE SERPL-SCNC: 102 MMOL/L (ref 98–107)
CLARITY UR: CLEAR
CO2 SERPL-SCNC: 25.6 MMOL/L (ref 22–29)
COLOR UR: YELLOW
CREAT SERPL-MCNC: 0.78 MG/DL (ref 0.57–1)
DEPRECATED RDW RBC AUTO: 40.7 FL (ref 37–54)
EGFRCR SERPLBLD CKD-EPI 2021: 106.3 ML/MIN/1.73
EOSINOPHIL # BLD AUTO: 0.31 10*3/MM3 (ref 0–0.4)
EOSINOPHIL NFR BLD AUTO: 3.7 % (ref 0.3–6.2)
ERYTHROCYTE [DISTWIDTH] IN BLOOD BY AUTOMATED COUNT: 11.7 % (ref 12.3–15.4)
GLOBULIN UR ELPH-MCNC: 3.7 GM/DL
GLUCOSE SERPL-MCNC: 106 MG/DL (ref 65–99)
GLUCOSE UR STRIP-MCNC: NEGATIVE MG/DL
HCG INTACT+B SERPL-ACNC: <0.5 MIU/ML
HCT VFR BLD AUTO: 42.7 % (ref 34–46.6)
HGB BLD-MCNC: 14.2 G/DL (ref 12–15.9)
HGB UR QL STRIP.AUTO: NEGATIVE
HOLD SPECIMEN: NORMAL
HOLD SPECIMEN: NORMAL
IMM GRANULOCYTES # BLD AUTO: 0.02 10*3/MM3 (ref 0–0.05)
IMM GRANULOCYTES NFR BLD AUTO: 0.2 % (ref 0–0.5)
KETONES UR QL STRIP: NEGATIVE
LEUKOCYTE ESTERASE UR QL STRIP.AUTO: NEGATIVE
LIPASE SERPL-CCNC: 16 U/L (ref 13–60)
LYMPHOCYTES # BLD AUTO: 1.71 10*3/MM3 (ref 0.7–3.1)
LYMPHOCYTES NFR BLD AUTO: 20.3 % (ref 19.6–45.3)
MCH RBC QN AUTO: 31.7 PG (ref 26.6–33)
MCHC RBC AUTO-ENTMCNC: 33.3 G/DL (ref 31.5–35.7)
MCV RBC AUTO: 95.3 FL (ref 79–97)
MONOCYTES # BLD AUTO: 0.62 10*3/MM3 (ref 0.1–0.9)
MONOCYTES NFR BLD AUTO: 7.3 % (ref 5–12)
NEUTROPHILS NFR BLD AUTO: 5.7 10*3/MM3 (ref 1.7–7)
NEUTROPHILS NFR BLD AUTO: 67.6 % (ref 42.7–76)
NITRITE UR QL STRIP: NEGATIVE
NRBC BLD AUTO-RTO: 0 /100 WBC (ref 0–0.2)
PH UR STRIP.AUTO: 6 [PH] (ref 5–8)
PLATELET # BLD AUTO: 367 10*3/MM3 (ref 140–450)
PMV BLD AUTO: 9.5 FL (ref 6–12)
POTASSIUM SERPL-SCNC: 3.4 MMOL/L (ref 3.5–5.2)
PROT SERPL-MCNC: 7.5 G/DL (ref 6–8.5)
PROT UR QL STRIP: NEGATIVE
RBC # BLD AUTO: 4.48 10*6/MM3 (ref 3.77–5.28)
SODIUM SERPL-SCNC: 138 MMOL/L (ref 136–145)
SP GR UR STRIP: 1.01 (ref 1–1.03)
UROBILINOGEN UR QL STRIP: NORMAL
WBC NRBC COR # BLD AUTO: 8.44 10*3/MM3 (ref 3.4–10.8)
WHOLE BLOOD HOLD COAG: NORMAL
WHOLE BLOOD HOLD SPECIMEN: NORMAL

## 2025-04-11 PROCEDURE — 81003 URINALYSIS AUTO W/O SCOPE: CPT | Performed by: EMERGENCY MEDICINE

## 2025-04-11 PROCEDURE — 85025 COMPLETE CBC W/AUTO DIFF WBC: CPT | Performed by: EMERGENCY MEDICINE

## 2025-04-11 PROCEDURE — 74177 CT ABD & PELVIS W/CONTRAST: CPT

## 2025-04-11 PROCEDURE — 84702 CHORIONIC GONADOTROPIN TEST: CPT | Performed by: EMERGENCY MEDICINE

## 2025-04-11 PROCEDURE — 25510000001 IOPAMIDOL PER 1 ML: Performed by: EMERGENCY MEDICINE

## 2025-04-11 PROCEDURE — 96374 THER/PROPH/DIAG INJ IV PUSH: CPT

## 2025-04-11 PROCEDURE — 25010000002 ONDANSETRON PER 1 MG

## 2025-04-11 PROCEDURE — 99285 EMERGENCY DEPT VISIT HI MDM: CPT

## 2025-04-11 PROCEDURE — 96375 TX/PRO/DX INJ NEW DRUG ADDON: CPT

## 2025-04-11 PROCEDURE — 25010000002 KETOROLAC TROMETHAMINE PER 15 MG

## 2025-04-11 PROCEDURE — 80053 COMPREHEN METABOLIC PANEL: CPT | Performed by: EMERGENCY MEDICINE

## 2025-04-11 PROCEDURE — 83690 ASSAY OF LIPASE: CPT | Performed by: EMERGENCY MEDICINE

## 2025-04-11 RX ORDER — KETOROLAC TROMETHAMINE 30 MG/ML
30 INJECTION, SOLUTION INTRAMUSCULAR; INTRAVENOUS ONCE
Status: COMPLETED | OUTPATIENT
Start: 2025-04-11 | End: 2025-04-11

## 2025-04-11 RX ORDER — SODIUM CHLORIDE 0.9 % (FLUSH) 0.9 %
10 SYRINGE (ML) INJECTION AS NEEDED
Status: DISCONTINUED | OUTPATIENT
Start: 2025-04-11 | End: 2025-04-12 | Stop reason: HOSPADM

## 2025-04-11 RX ORDER — ONDANSETRON 2 MG/ML
4 INJECTION INTRAMUSCULAR; INTRAVENOUS ONCE
Status: COMPLETED | OUTPATIENT
Start: 2025-04-11 | End: 2025-04-11

## 2025-04-11 RX ORDER — MORPHINE SULFATE 2 MG/ML
2 INJECTION, SOLUTION INTRAMUSCULAR; INTRAVENOUS ONCE
Status: COMPLETED | OUTPATIENT
Start: 2025-04-12 | End: 2025-04-12

## 2025-04-11 RX ORDER — IOPAMIDOL 755 MG/ML
100 INJECTION, SOLUTION INTRAVASCULAR
Status: COMPLETED | OUTPATIENT
Start: 2025-04-11 | End: 2025-04-11

## 2025-04-11 RX ADMIN — ONDANSETRON 4 MG: 2 INJECTION INTRAMUSCULAR; INTRAVENOUS at 22:31

## 2025-04-11 RX ADMIN — KETOROLAC TROMETHAMINE 30 MG: 30 INJECTION, SOLUTION INTRAMUSCULAR; INTRAVENOUS at 22:31

## 2025-04-11 RX ADMIN — IOPAMIDOL 100 ML: 755 INJECTION, SOLUTION INTRAVENOUS at 23:14

## 2025-04-12 VITALS
HEART RATE: 90 BPM | DIASTOLIC BLOOD PRESSURE: 67 MMHG | SYSTOLIC BLOOD PRESSURE: 108 MMHG | RESPIRATION RATE: 18 BRPM | TEMPERATURE: 98 F | WEIGHT: 109.79 LBS | OXYGEN SATURATION: 98 % | BODY MASS INDEX: 20.73 KG/M2 | HEIGHT: 61 IN

## 2025-04-12 PROCEDURE — 96375 TX/PRO/DX INJ NEW DRUG ADDON: CPT

## 2025-04-12 PROCEDURE — 25010000002 MORPHINE PER 10 MG: Performed by: EMERGENCY MEDICINE

## 2025-04-12 RX ADMIN — MORPHINE SULFATE 2 MG: 2 INJECTION, SOLUTION INTRAMUSCULAR; INTRAVENOUS at 00:06

## 2025-04-12 NOTE — ED PROVIDER NOTES
Time: 11:23 PM EDT  Date of encounter:  4/11/2025  Independent Historian/Clinical History and Information was obtained by:   Patient    History is limited by: N/A    Chief Complaint: Abdominal pain      History of Present Illness:  Patient is a 29 y.o. year old female who presents to the emergency department for evaluation of right-sided abdominal pain x 3 days.  Patient has a significant abdominal history including primary sclerosing cholangitis, chronic liver disease, current J-pouch secondary to colon resection, history of pancreatitis.  Patient reports no known fevers.  Denies recent antibiotics in last 2 days.  Denies hospitalizations last 6 months.  Reports that she had an anal fistula surgery in October of last year with Dr. Villatoro for repair.  Reports that she is not having no problems with passing stool with last bowel movement today.  Denies dysuria or hematuria.  Denies known aggravating factors.  Reports that she still has her gallbladder and appendix.    Patient Care Team  Primary Care Provider: Demi Fernandez MD    Past Medical History:     Allergies   Allergen Reactions    Gluten Meal Other (See Comments)     Celiac disease     Latex Swelling, Rash and Other (See Comments)     RASH     Past Medical History:   Diagnosis Date    Anemia     NO CURRENT ISSUES    Anxiety     AR (allergic rhinitis)     Arthritis     Celiac disease     CFS (chronic fatigue syndrome)     Chronic liver disease     PRIMARY SCLEROSING CHOLANGITIS NO CURRENT ISSUES R/T TO [facility]- ASYMPTOMATIC    Chronic pain     Colon polyp     Crohn's disease     Depression     Fibromyalgia     Fibromyalgia, primary     Gastric ulcer     GI (gastrointestinal bleed)     Ulcerative Colitis    H/O psychiatric care     Hemorrhoids     History of transfusion     Hypermobility syndrome     Hypothyroidism     IBS (irritable bowel syndrome)     Insomnia     Lactose intolerance     Moderate episode of recurrent major depressive disorder 01/29/2018     CONTINUE CURRENT MEDS     Nasal crusting     Osteopenia     Pancreatitis     NO CURRENT ISSUES    Panic attacks     PONV (postoperative nausea and vomiting)     Pouchitis     Primary sclerosing cholangitis     PTSD (post-traumatic stress disorder)     RA (rheumatoid arthritis)     Rectal bleeding     Recurrent epistaxis     Scoliosis     TMJ dysfunction     Ulcerative colitis      Past Surgical History:   Procedure Laterality Date    ANAL FISSURECTOMY/FISTULECTOMY N/A 6/30/2023    Procedure: ANAL FISTULA REPAIR ligation intersphincteric tract;  Surgeon: Jim Villatoro MD;  Location: [facility] OR [facility] [place];  Service: General;  Laterality: N/A;    COLECTOMY TOTAL  2006    COLECTOMY W/ ILEOANAI J POUCH & SMALL BOWEL PULL THROUGH     COLON SURGERY      COLONOSCOPY      ENDOSCOPY      INCISION AND DRAINAGE PERIRECTAL ABSCESS      RECTAL EXAMINATION UNDER ANESTHESIA N/A 9/6/2024    Procedure: EXAM UNDER ANESTHESIA,  fistulotomy;  Surgeon: Jim Villatoro MD;  Location: [facility] OR [facility] [place];  Service: General;  Laterality: N/A;    RECTAL EXAMINATION UNDER ANESTHESIA N/A 10/28/2024    Procedure: EXAM UNDER ANESTHESIA, RECTAL BIOPSY, take a look at anus and treat as indicated;  Surgeon: Jim Villatoro MD;  Location: [facility] MAIN OR;  Service: General;  Laterality: N/A;    SIGMOIDOSCOPY N/A 6/2/2023    Procedure: POUCHOSCOPY WITH BIOPSIES;  Surgeon: Jim Villatoro MD;  Location: [facility] ENDOSCOPY;  Service: General;  Laterality: N/A;  ULCERATIVE COLITIS     Family History   Problem Relation Age of Onset    Anxiety disorder Mother     Asthma Sister     Diabetes Sister     Arthritis Maternal Grandmother     Colon polyps Maternal Grandmother     Cancer Maternal Grandfather     Heart disease Maternal Grandfather     Prostate cancer Paternal Grandfather     Diabetes Paternal Grandfather     Stroke Other     Heart disease Other     Kidney disease Other     Diabetes Other     Rectal cancer Other     Colon cancer Other      Malig Hyperthermia Neg Hx        Home Medications:  Prior to Admission medications    Medication Sig Start Date End Date Taking? Authorizing Provider   cyclobenzaprine (FLEXERIL) 5 MG tablet Take 1 tablet by mouth 2 (Two) Times a Day As Needed for Muscle Spasms. 1/13/25  Yes Demi Fernandez MD   ferrous sulfate (FeroSul) 325 (65 FE) MG tablet Take 1 tablet by mouth 2 (Two) Times a Day. 1/13/25  Yes Demi Fernandez MD   hydrOXYzine (ATARAX) 25 MG tablet Take 1 tablet by mouth 2 (Two) Times a Day. 1/13/25  Yes Demi Fernandez MD   Lactobacillus Acid-Pectin (Acidophilus/Pectin) capsule Take 1 capsule by mouth 2 (Two) Times a Day. 1/13/25  Yes Adrian Jessica APRN   levothyroxine (SYNTHROID, LEVOTHROID) 100 MCG tablet Take 1 tablet by mouth Daily. 1/13/25  Yes Demi Fernandez MD   loperamide (IMODIUM) 2 MG capsule Take 2 capsules by mouth 2 (Two) Times a Day. 1/13/25  Yes Demi Fernandez MD   sertraline (ZOLOFT) 50 MG tablet Take 1 tablet by mouth Daily. 1/13/25  Yes Demi Fernandez MD   ursodiol (ACTIGALL) 300 MG capsule Take 2 capsules by mouth 2 (Two) Times a Day. 1/13/25  Yes Demi Fernandez MD   NON FORMULARY Pain medicated lotion with Gabapentin. Patient uses for Chronic pain/Arthritis    Provider, MD Obdulia   metroNIDAZOLE (FLAGYL) 250 MG tablet TAKE 1 TABLET BY MOUTH EVERY MORNING 10/17/24 4/11/25  Demi Fernandez MD   oxyCODONE-acetaminophen (Percocet) 5-325 MG per tablet Take 1 tablet by mouth Every 6 (Six) Hours As Needed for Moderate Pain. 10/28/24 4/11/25  Jim Villatoro MD        Social History:   Social History     Tobacco Use    Smoking status: Never    Smokeless tobacco: Never   Vaping Use    Vaping status: Never Used   Substance Use Topics    Alcohol use: Never    Drug use: Never         Review of Systems:  Review of Systems     Physical Exam:  /71 (BP Location: Left arm, Patient Position: Lying)   Pulse 96   Temp 98.2 °F (36.8 °C) (Oral)   Resp  "16   Ht 154.9 cm (61\")   Wt 49.8 kg (109 lb 12.6 oz)   LMP 03/01/2025 (Approximate)   SpO2 99%   BMI 20.74 kg/m²     Physical Exam  Vitals reviewed.   Constitutional:       General: She is not in acute distress.     Appearance: She is not ill-appearing.   HENT:      Head: Normocephalic.      Mouth/Throat:      Mouth: Mucous membranes are moist.   Eyes:      Pupils: Pupils are equal, round, and reactive to light.   Pulmonary:      Effort: Pulmonary effort is normal.   Abdominal:      General: There is no distension.      Tenderness: There is abdominal tenderness in the right upper quadrant and right lower quadrant. There is guarding.   Musculoskeletal:      Cervical back: Neck supple.   Skin:     General: Skin is warm and dry.   Neurological:      General: No focal deficit present.      Mental Status: She is alert and oriented to person, place, and time.   Psychiatric:         Mood and Affect: Mood normal.         Behavior: Behavior normal.                    Medical Decision Making:      Comorbidities that affect care:    Rheumatoid arthritis    External Notes reviewed:    Previous Clinic Note: Previous office visit on 11/20/2024 reviewed      The following orders were placed and all results were independently analyzed by me:  Orders Placed This Encounter   Procedures    CT Abdomen Pelvis With Contrast    [place] Draw    Comprehensive Metabolic Panel    Lipase    Urinalysis With Microscopic If Indicated (No Culture) - Urine, Clean Catch    hCG, Quantitative, Pregnancy    CBC Auto Differential    NPO Diet NPO Type: Strict NPO    Undress & Gown    Insert Peripheral IV    CBC & Differential    Green Top (Gel)    Lavender Top    Gold Top - SST    Light Blue Top       Medications Given in the Emergency Department:  Medications   sodium chloride 0.9 % flush 10 mL (has no administration in time range)   morphine injection 2 mg (has no administration in time range)   ketorolac (TORADOL) injection 30 mg (30 mg " Intravenous Given 4/11/25 2231)   ondansetron (ZOFRAN) injection 4 mg (4 mg Intravenous Given 4/11/25 2231)   iopamidol (ISOVUE-370) 76 % injection 100 mL (100 mL Intravenous Given 4/11/25 2314)        ED Course:    ED Course as of 04/11/25 2349 Fri Apr 11, 2025 2241 CBC reviewed.  No acute findings.  CMP reviewed.  Evidence of mild hyperglycemia 106 mild hypokalemia 3.4.  No other acute findings. [CB]   2241 UA reviewed.  No acute findings.  hCG reviewed.  No acute findings. [CB]   2348 Lipase reviewed.  No acute findings.  No evidence of epigastric abdominal pain or vomiting.  No evidence of acute appendicitis. [CB]   2348 CT abdomen pelvis reviewed.  Evidence of isolated right ovarian cyst measuring 1.6 cm.  No other acute intra-abdominal findings. [CB]      ED Course User Index  [CB] Espinoza Ward, VINH       Labs:    Lab Results (last 24 hours)       Procedure Component Value Units Date/Time    CBC & Differential [[id number]]  (Abnormal) Collected: 04/11/25 2123    Specimen: Blood Updated: 04/11/25 2131    Narrative:      The following orders were created for panel order CBC & Differential.  Procedure                               Abnormality         Status                     ---------                               -----------         ------                     CBC Auto Differential[[id number]]        Abnormal            Final result                 Please view results for these tests on the individual orders.    Comprehensive Metabolic Panel [[id number]]  (Abnormal) Collected: 04/11/25 2123    Specimen: Blood Updated: 04/11/25 2151     Glucose 106 mg/dL      BUN 7 mg/dL      Creatinine 0.78 mg/dL      Sodium 138 mmol/L      Potassium 3.4 mmol/L      Chloride 102 mmol/L      CO2 25.6 mmol/L      Calcium 8.8 mg/dL      Total Protein 7.5 g/dL      Albumin 3.8 g/dL      ALT (SGPT) 17 U/L      AST (SGOT) 21 U/L      Alkaline Phosphatase 156 U/L      Total Bilirubin 0.5 mg/dL      Globulin 3.7 gm/dL      A/G  Ratio 1.0 g/dL      BUN/Creatinine Ratio 9.0     Anion Gap 10.4 mmol/L      eGFR 106.3 mL/min/1.73     Narrative:      GFR Categories in Chronic Kidney Disease (CKD)      GFR Category          GFR (mL/min/1.73)    Interpretation  G1                     90 or greater         Normal or high (1)  G2                      60-89                Mild decrease (1)  G3a                   45-59                Mild to moderate decrease  G3b                   30-44                Moderate to severe decrease  G4                    15-29                Severe decrease  G5                    14 or less           Kidney failure          (1)In the absence of evidence of kidney disease, neither GFR category G1 or G2 fulfill the criteria for CKD.    eGFR calculation 2021 CKD-EPI creatinine equation, which does not include race as a factor    Lipase [[id number]]  (Normal) Collected: 04/11/25 2123    Specimen: Blood Updated: 04/11/25 2151     Lipase 16 U/L     hCG, Quantitative, Pregnancy [[id number]] Collected: 04/11/25 2123    Specimen: Blood Updated: 04/11/25 2149     HCG Quantitative <0.50 mIU/mL     Narrative:      HCG Ranges by Gestational Age    Females - non-pregnant premenopausal   </= 1mIU/mL HCG  Females - postmenopausal               </= 7mIU/mL HCG    3 Weeks       5.4   -      72 mIU/mL  4 Weeks      10.2   -     708 mIU/mL  5 Weeks       217   -   8,245 mIU/mL  6 Weeks       152   -  32,177 mIU/mL  7 Weeks     4,059   - 153,767 mIU/mL  8 Weeks    31,366   - 149,094 mIU/mL  9 Weeks    59,109   - 135,901 mIU/mL  10 Weeks   44,186   - 170,409 mIU/mL  12 Weeks   27,107   - 201,615 mIU/mL  14 Weeks   24,302   -  93,646 mIU/mL  15 Weeks   12,540   -  69,747 mIU/mL  16 Weeks    8,904   -  55,332 mIU/mL  17 Weeks    8,240   -  51,793 mIU/mL  18 Weeks    9,649   -  55,271 mIU/mL      CBC Auto Differential [[id number]]  (Abnormal) Collected: 04/11/25 2123    Specimen: Blood Updated: 04/11/25 2131     WBC 8.44 10*3/mm3      RBC 4.48  10*6/mm3      Hemoglobin 14.2 g/dL      Hematocrit 42.7 %      MCV 95.3 fL      MCH 31.7 pg      MCHC 33.3 g/dL      RDW 11.7 %      RDW-SD 40.7 fl      MPV 9.5 fL      Platelets 367 10*3/mm3      Neutrophil % 67.6 %      Lymphocyte % 20.3 %      Monocyte % 7.3 %      Eosinophil % 3.7 %      Basophil % 0.9 %      Immature Grans % 0.2 %      Neutrophils, Absolute 5.70 10*3/mm3      Lymphocytes, Absolute 1.71 10*3/mm3      Monocytes, Absolute 0.62 10*3/mm3      Eosinophils, Absolute 0.31 10*3/mm3      Basophils, Absolute 0.08 10*3/mm3      Immature Grans, Absolute 0.02 10*3/mm3      nRBC 0.0 /100 WBC     Urinalysis With Microscopic If Indicated (No Culture) - Urine, Clean Catch [[id number]]  (Normal) Collected: 04/11/25 2151    Specimen: Urine, Clean Catch Updated: 04/11/25 2200     Color, UA Yellow     Appearance, UA Clear     pH, UA 6.0     Specific Gravity, UA 1.011     Glucose, UA Negative     Ketones, UA Negative     Bilirubin, UA Negative     Blood, UA Negative     Protein, UA Negative     Leuk Esterase, UA Negative     Nitrite, UA Negative     Urobilinogen, UA 0.2 E.U./dL    Narrative:      Urine microscopic not indicated.             Imaging:    CT Abdomen Pelvis With Contrast  Result Date: 4/11/2025  CT ABDOMEN PELVIS W CONTRAST Date of Exam: 4/11/2025 11:12 PM EDT Indication: + Mcburneys. Right CVA pain. abd pain w guarding. Comparison: 5/13/2021 Technique: Axial CT images were obtained of the abdomen and pelvis after the uneventful intravenous administration of iodinated contrast. Reconstructed coronal and sagittal images were also obtained. Automated exposure control and iterative construction methods were used. Findings: Lung bases are clear. Aorta is normal. Gallbladder unremarkable. No biliary obstruction. Solid abdominal organs are normal. The kidneys are nonobstructed. Urinary bladder is normal. There is a 1.6 cm right ovarian cyst. Solid pelvic organs are otherwise unremarkable. Patient is status post  colectomy with rectal pouch formation. No small bowel obstruction is seen, and there is no definite acute GI tract inflammation. The stomach is normal. No adenopathy or free fluid is seen. There is lumbar levoscoliosis.     1.No acute abdominal or pelvic abnormality identified. 2.1.6 cm right ovarian cyst. 3.Status post colectomy with rectal pouch formation. No bowel obstruction. 4.Normal nonobstructed kidneys. Electronically Signed: Alphonso Yu MD  4/11/2025 11:29 PM EDT  Workstation ID: [id number]        Differential Diagnosis and Discussion:    Abdominal Pain: Based on the patient's signs and symptoms, I considered abdominal aortic aneurysm, small bowel obstruction, pancreatitis, acute cholecystitis, acute appendecitis, peptic ulcer disease, gastritis, colitis, endocrine disorders, irritable bowel syndrome and other differential diagnosis an etiology of the patient's abdominal pain.    PROCEDURES:    Labs were collected in the emergency department and all labs were reviewed and interpreted by me.    No orders to display       Procedures    MDM  The patient is resting comfortably and feels better, is alert and in no distress. Repeat examination is unremarkable and benign; in particular, there's no discomfort at McBurney's point and there is no pulsatile mass. The history, exam, diagnostic testing, and current condition does not suggest acute appendicitis, bowel obstruction, acute cholecystitis, bowel perforation, major gastrointestinal bleeding, severe diverticulitis, abdominal aortic aneurysm, mesenteric ischemia, volvulus, sepsis, or other significant pathology that warrants further testing, continued ED treatment, admission, for surgical evaluation at this point.  CT imaging revealed evidence of isolated right ovarian cyst without other acute findings.  The vital signs have been stable. The patient does not have uncontrollable pain, intractable vomiting, or other significant symptoms. The patient's condition  is stable and appropriate for discharge from the emergency department.                  Patient Care Considerations:    SEPSIS was considered but is NOT present in the emergency department as SIRS criteria is not present. ANTIBIOTICS: I considered prescribing antibiotics as an outpatient however no bacterial focus of infection was found.      Consultants/Shared Management Plan:    None    Social Determinants of Health:    Patient has presented with family members who are responsible, reliable and will ensure follow up care.      Disposition and Care Coordination:    Discharged: I considered escalation of care by admitting this patient to the hospital, however patient does not meet sepsis or SIRS criteria    The patient was evaluated in the emergency department. The patient is well-appearing. The patient is able to tolerate po intake in the emergency department. The patient´s vital signs have been stable. On re-examination the patient does not appear toxic, has no meningeal signs, has no intractable vomiting, no respiratory distress and no apparent pain.  The caretaker was counseled to return to the ER for uncontrollable fever, intractable vomiting, excessive crying, altered mental status, decreased po intake, or any signs of distress that they may perceive. Caretaker was counseled to return at any time for any concerns that they may have. The caretaker will pursue further outpatient evaluation with the primary care physician or other designated or consultant physician as indicated in the discharge instructions.  I have explained discharge medications and the need for follow up with the patient/caretakers. This was also printed in the discharge instructions. Patient was discharged with the following medications and follow up:      Medication List      No changes were made to your prescriptions during this visit.      Demi Fernandez MD  [address] [address]  [phone]    Schedule an  appointment as soon as possible for a visit          Final diagnoses:   Right upper quadrant abdominal pain   Cyst of right ovary        ED Disposition       ED Disposition   Discharge    Condition   Stable    Comment   --               This medical record created using voice recognition software.             Espinoza Ward PA-C  04/11/25 [id number]

## 2025-04-25 NOTE — TELEPHONE ENCOUNTER
Thyroid Hormones Protocol Uyphyr0004/24/2025 05:21 PM   Protocol Details Normal TSH in past 12 months    Recent or future visit with authorizing provider

## 2025-04-29 RX ORDER — LEVOTHYROXINE SODIUM 100 UG/1
100 TABLET ORAL DAILY
Qty: 90 TABLET | Refills: 1 | Status: SHIPPED | OUTPATIENT
Start: 2025-04-29

## 2025-04-29 RX ORDER — CYCLOBENZAPRINE HCL 5 MG
TABLET ORAL
Qty: 180 TABLET | Refills: 1 | Status: SHIPPED | OUTPATIENT
Start: 2025-04-29

## 2025-07-25 NOTE — TELEPHONE ENCOUNTER
Pts POA is returning phone call to check on refills. We are going to get her scheduled, but the earliest we can get her on the books is November. Should we fill a temporary amount until her next apt?    She also stated her ankles and feet are swelling mostly in the evening time.

## 2025-07-27 RX ORDER — LOPERAMIDE HYDROCHLORIDE 2 MG/1
4 CAPSULE ORAL 2 TIMES DAILY
Qty: 480 CAPSULE | Refills: 3 | Status: SHIPPED | OUTPATIENT
Start: 2025-07-27

## 2025-07-27 RX ORDER — LEVOTHYROXINE SODIUM 100 UG/1
100 TABLET ORAL DAILY
Qty: 90 TABLET | Refills: 1 | Status: SHIPPED | OUTPATIENT
Start: 2025-07-27

## 2025-07-27 RX ORDER — HYDROXYZINE HYDROCHLORIDE 25 MG/1
25 TABLET, FILM COATED ORAL 2 TIMES DAILY
Qty: 180 TABLET | Refills: 1 | Status: SHIPPED | OUTPATIENT
Start: 2025-07-27 | End: 2025-07-28 | Stop reason: SDUPTHER

## 2025-07-27 RX ORDER — CYCLOBENZAPRINE HCL 5 MG
5 TABLET ORAL 3 TIMES DAILY PRN
Qty: 180 TABLET | Refills: 1 | Status: SHIPPED | OUTPATIENT
Start: 2025-07-27

## 2025-07-27 RX ORDER — URSODIOL 300 MG/1
600 CAPSULE ORAL 2 TIMES DAILY
Qty: 360 CAPSULE | Refills: 1 | Status: SHIPPED | OUTPATIENT
Start: 2025-07-27 | End: 2025-07-28 | Stop reason: SDUPTHER

## 2025-07-28 RX ORDER — URSODIOL 300 MG/1
600 CAPSULE ORAL 2 TIMES DAILY
Qty: 360 CAPSULE | Refills: 1 | Status: SHIPPED | OUTPATIENT
Start: 2025-07-28

## 2025-07-28 RX ORDER — HYDROXYZINE HYDROCHLORIDE 25 MG/1
25 TABLET, FILM COATED ORAL 2 TIMES DAILY
Qty: 180 TABLET | Refills: 1 | Status: SHIPPED | OUTPATIENT
Start: 2025-07-28

## (undated) DEVICE — SOL IRR H2O BTL 500ML STRL

## (undated) DEVICE — BRIEF KNIT SEAMLSS PREM 70IN 3XL PK/2

## (undated) DEVICE — PENCL SMOKE/EVAC MEGADYNE TELESCP 10FT

## (undated) DEVICE — STERILE POLYISOPRENE POWDER-FREE SURGICAL GLOVES: Brand: PROTEXIS

## (undated) DEVICE — DRESSING,GAUZE,XEROFORM,CURAD,5"X9",ST: Brand: CURAD

## (undated) DEVICE — SINGLE-USE BIOPSY FORCEPS: Brand: RADIAL JAW 4

## (undated) DEVICE — YANKAUER,BULB TIP,W/O VENT,RIGID,STERILE: Brand: MEDLINE

## (undated) DEVICE — KENDALL SCD EXPRESS SLEEVES, KNEE LENGTH, MEDIUM: Brand: KENDALL SCD

## (undated) DEVICE — MINOR-LF: Brand: MEDLINE INDUSTRIES, INC.

## (undated) DEVICE — STERILE POLYISOPRENE POWDER-FREE SURGICAL GLOVES WITH EMOLLIENT COATING: Brand: PROTEXIS

## (undated) DEVICE — SOL IRR H2O BTL 1000ML STRL

## (undated) DEVICE — DRSNG PAD ABD 8X10IN STRL

## (undated) DEVICE — CONN JET HYDRA H20 AUXILIARY DISP

## (undated) DEVICE — LEGGINGS, PAIR, CLEAR, STERILE: Brand: MEDLINE

## (undated) DEVICE — SYR LUERLOK 30CC

## (undated) DEVICE — JELLY,LUBE,STERILE,FLIP TOP,TUBE,4-OZ: Brand: MEDLINE

## (undated) DEVICE — DRAPE,UNDERBUTTOCKS,PCH,STERILE: Brand: MEDLINE

## (undated) DEVICE — INTENDED FOR TISSUE SEPARATION, AND OTHER PROCEDURES THAT REQUIRE A SHARP SURGICAL BLADE TO PUNCTURE OR CUT.: Brand: BARD-PARKER ® STAINLESS STEEL BLADES

## (undated) DEVICE — SUT VIC 3/0 SH 27IN J416H

## (undated) DEVICE — SOLIDIFIER LIQLOC PLS 1500CC BT

## (undated) DEVICE — SOL IRRG H2O PL/BG 1000ML STRL

## (undated) DEVICE — TUBING, SUCTION, 1/4" X 10', STRAIGHT: Brand: MEDLINE

## (undated) DEVICE — VAGINAL PREP TRAY: Brand: MEDLINE INDUSTRIES, INC.

## (undated) DEVICE — Device: Brand: DEFENDO AIR/WATER/SUCTION AND BIOPSY VALVE

## (undated) DEVICE — GLV SURG BIOGEL LTX PF 7 1/2

## (undated) DEVICE — GAUZE,SPONGE,4"X4",16PLY,STRL,LF,10/TRAY: Brand: MEDLINE

## (undated) DEVICE — LINER SURG CANSTR SXN S/RIGD 1500CC

## (undated) DEVICE — Device

## (undated) DEVICE — STRAP STIRUP SLP RNG 19X3.5IN DISP

## (undated) DEVICE — INTENDED TO BE USED TO OCCLUDE, RETRACT AND IDENTIFY ARTERIES, VEINS, TENDONS AND NERVES IN SURGICAL PROCEDURES: Brand: STERION®  VESSEL LOOP

## (undated) DEVICE — CATH IV INSYTE AUTOGARD BLD/CONTRL SHLD 20G 1.16IN